# Patient Record
Sex: FEMALE | Race: WHITE | Employment: OTHER | URBAN - METROPOLITAN AREA
[De-identification: names, ages, dates, MRNs, and addresses within clinical notes are randomized per-mention and may not be internally consistent; named-entity substitution may affect disease eponyms.]

---

## 2018-12-07 ENCOUNTER — OFFICE VISIT (OUTPATIENT)
Dept: INTERNAL MEDICINE CLINIC | Age: 63
End: 2018-12-07
Payer: MEDICARE

## 2018-12-07 VITALS
SYSTOLIC BLOOD PRESSURE: 118 MMHG | BODY MASS INDEX: 36.5 KG/M2 | HEART RATE: 68 BPM | WEIGHT: 206 LBS | DIASTOLIC BLOOD PRESSURE: 66 MMHG | HEIGHT: 63 IN

## 2018-12-07 DIAGNOSIS — Z11.59 NEED FOR HEPATITIS C SCREENING TEST: ICD-10-CM

## 2018-12-07 DIAGNOSIS — Z11.4 SCREENING FOR HIV (HUMAN IMMUNODEFICIENCY VIRUS): ICD-10-CM

## 2018-12-07 DIAGNOSIS — R73.9 HYPERGLYCEMIA: ICD-10-CM

## 2018-12-07 DIAGNOSIS — Z13.820 OSTEOPOROSIS SCREENING: ICD-10-CM

## 2018-12-07 DIAGNOSIS — K50.90 CROHN'S DISEASE WITHOUT COMPLICATION, UNSPECIFIED GASTROINTESTINAL TRACT LOCATION (HCC): Chronic | ICD-10-CM

## 2018-12-07 DIAGNOSIS — E53.8 B12 DEFICIENCY: ICD-10-CM

## 2018-12-07 DIAGNOSIS — Z12.39 BREAST CANCER SCREENING: ICD-10-CM

## 2018-12-07 DIAGNOSIS — E55.9 VITAMIN D DEFICIENCY: ICD-10-CM

## 2018-12-07 DIAGNOSIS — Z78.0 POSTMENOPAUSAL: ICD-10-CM

## 2018-12-07 DIAGNOSIS — H60.501 ACUTE OTITIS EXTERNA OF RIGHT EAR, UNSPECIFIED TYPE: Primary | ICD-10-CM

## 2018-12-07 LAB
A/G RATIO: 1.5 (ref 1.1–2.2)
ALBUMIN SERPL-MCNC: 4.7 G/DL (ref 3.4–5)
ALP BLD-CCNC: 92 U/L (ref 40–129)
ALT SERPL-CCNC: 12 U/L (ref 10–40)
ANION GAP SERPL CALCULATED.3IONS-SCNC: 13 MMOL/L (ref 3–16)
AST SERPL-CCNC: 17 U/L (ref 15–37)
BASOPHILS ABSOLUTE: 0 K/UL (ref 0–0.2)
BASOPHILS RELATIVE PERCENT: 0.2 %
BILIRUB SERPL-MCNC: 0.4 MG/DL (ref 0–1)
BUN BLDV-MCNC: 12 MG/DL (ref 7–20)
CALCIUM SERPL-MCNC: 9.2 MG/DL (ref 8.3–10.6)
CHLORIDE BLD-SCNC: 103 MMOL/L (ref 99–110)
CO2: 27 MMOL/L (ref 21–32)
CREAT SERPL-MCNC: 0.8 MG/DL (ref 0.6–1.2)
EOSINOPHILS ABSOLUTE: 0 K/UL (ref 0–0.6)
EOSINOPHILS RELATIVE PERCENT: 0.1 %
FOLATE: 4.23 NG/ML (ref 4.78–24.2)
GFR AFRICAN AMERICAN: >60
GFR NON-AFRICAN AMERICAN: >60
GLOBULIN: 3.1 G/DL
GLUCOSE BLD-MCNC: 97 MG/DL (ref 70–99)
HCT VFR BLD CALC: 43.4 % (ref 36–48)
HEMOGLOBIN: 14.3 G/DL (ref 12–16)
HEPATITIS C ANTIBODY INTERPRETATION: NORMAL
LYMPHOCYTES ABSOLUTE: 2 K/UL (ref 1–5.1)
LYMPHOCYTES RELATIVE PERCENT: 37.5 %
MCH RBC QN AUTO: 30.7 PG (ref 26–34)
MCHC RBC AUTO-ENTMCNC: 33 G/DL (ref 31–36)
MCV RBC AUTO: 93.1 FL (ref 80–100)
MONOCYTES ABSOLUTE: 0.2 K/UL (ref 0–1.3)
MONOCYTES RELATIVE PERCENT: 4.2 %
NEUTROPHILS ABSOLUTE: 3 K/UL (ref 1.7–7.7)
NEUTROPHILS RELATIVE PERCENT: 58 %
PDW BLD-RTO: 16.1 % (ref 12.4–15.4)
PLATELET # BLD: 175 K/UL (ref 135–450)
PMV BLD AUTO: 8.9 FL (ref 5–10.5)
POTASSIUM SERPL-SCNC: 4.3 MMOL/L (ref 3.5–5.1)
RBC # BLD: 4.66 M/UL (ref 4–5.2)
SODIUM BLD-SCNC: 143 MMOL/L (ref 136–145)
TOTAL PROTEIN: 7.8 G/DL (ref 6.4–8.2)
VITAMIN B-12: <150 PG/ML (ref 211–911)
VITAMIN D 25-HYDROXY: 29.7 NG/ML
WBC # BLD: 5.2 K/UL (ref 4–11)

## 2018-12-07 PROCEDURE — 99214 OFFICE O/P EST MOD 30 MIN: CPT | Performed by: NURSE PRACTITIONER

## 2018-12-07 PROCEDURE — G8427 DOCREV CUR MEDS BY ELIG CLIN: HCPCS | Performed by: NURSE PRACTITIONER

## 2018-12-07 PROCEDURE — G8400 PT W/DXA NO RESULTS DOC: HCPCS | Performed by: NURSE PRACTITIONER

## 2018-12-07 PROCEDURE — 3017F COLORECTAL CA SCREEN DOC REV: CPT | Performed by: NURSE PRACTITIONER

## 2018-12-07 PROCEDURE — G8417 CALC BMI ABV UP PARAM F/U: HCPCS | Performed by: NURSE PRACTITIONER

## 2018-12-07 PROCEDURE — 1036F TOBACCO NON-USER: CPT | Performed by: NURSE PRACTITIONER

## 2018-12-07 PROCEDURE — 4130F TOPICAL PREP RX AOE: CPT | Performed by: NURSE PRACTITIONER

## 2018-12-07 PROCEDURE — 1123F ACP DISCUSS/DSCN MKR DOCD: CPT | Performed by: NURSE PRACTITIONER

## 2018-12-07 PROCEDURE — 4040F PNEUMOC VAC/ADMIN/RCVD: CPT | Performed by: NURSE PRACTITIONER

## 2018-12-07 PROCEDURE — G8482 FLU IMMUNIZE ORDER/ADMIN: HCPCS | Performed by: NURSE PRACTITIONER

## 2018-12-07 PROCEDURE — 1101F PT FALLS ASSESS-DOCD LE1/YR: CPT | Performed by: NURSE PRACTITIONER

## 2018-12-07 PROCEDURE — 1090F PRES/ABSN URINE INCON ASSESS: CPT | Performed by: NURSE PRACTITIONER

## 2018-12-07 RX ORDER — CIPROFLOXACIN 500 MG/1
500 TABLET, FILM COATED ORAL 2 TIMES DAILY
Qty: 14 TABLET | Refills: 0 | Status: SHIPPED | OUTPATIENT
Start: 2018-12-07 | End: 2018-12-14

## 2018-12-07 ASSESSMENT — PATIENT HEALTH QUESTIONNAIRE - PHQ9
2. FEELING DOWN, DEPRESSED OR HOPELESS: 0
1. LITTLE INTEREST OR PLEASURE IN DOING THINGS: 0
SUM OF ALL RESPONSES TO PHQ QUESTIONS 1-9: 0
SUM OF ALL RESPONSES TO PHQ9 QUESTIONS 1 & 2: 0
SUM OF ALL RESPONSES TO PHQ QUESTIONS 1-9: 0

## 2018-12-08 LAB
ESTIMATED AVERAGE GLUCOSE: 99.7 MG/DL
HBA1C MFR BLD: 5.1 %
HIV AG/AB: NORMAL
HIV ANTIGEN: NORMAL
HIV-1 ANTIBODY: NORMAL
HIV-2 AB: NORMAL

## 2018-12-11 LAB — METHYLMALONIC ACID: 1.19 UMOL/L (ref 0–0.4)

## 2018-12-11 ASSESSMENT — ENCOUNTER SYMPTOMS: RESPIRATORY NEGATIVE: 1

## 2018-12-13 DIAGNOSIS — E53.8 B12 DEFICIENCY: ICD-10-CM

## 2018-12-13 RX ORDER — CYANOCOBALAMIN 1000 UG/ML
1000 INJECTION INTRAMUSCULAR; SUBCUTANEOUS
Qty: 12 ML | Refills: 3 | Status: SHIPPED | OUTPATIENT
Start: 2018-12-13 | End: 2020-08-10 | Stop reason: ALTCHOICE

## 2018-12-20 ENCOUNTER — HOSPITAL ENCOUNTER (OUTPATIENT)
Dept: GENERAL RADIOLOGY | Age: 63
Discharge: HOME | End: 2018-12-20
Payer: MEDICARE

## 2018-12-20 ENCOUNTER — HOSPITAL ENCOUNTER (OUTPATIENT)
Dept: WOMENS IMAGING | Age: 63
Discharge: HOME | End: 2018-12-20
Payer: MEDICARE

## 2018-12-20 DIAGNOSIS — Z12.39 BREAST CANCER SCREENING: ICD-10-CM

## 2018-12-20 DIAGNOSIS — Z78.0 POSTMENOPAUSAL: ICD-10-CM

## 2018-12-20 DIAGNOSIS — Z13.820 OSTEOPOROSIS SCREENING: ICD-10-CM

## 2018-12-20 PROCEDURE — 77067 SCR MAMMO BI INCL CAD: CPT

## 2018-12-20 PROCEDURE — 77080 DXA BONE DENSITY AXIAL: CPT

## 2018-12-20 PROCEDURE — 77063 BREAST TOMOSYNTHESIS BI: CPT

## 2019-06-12 ENCOUNTER — OFFICE VISIT (OUTPATIENT)
Dept: INTERNAL MEDICINE CLINIC | Age: 64
End: 2019-06-12
Payer: MEDICARE

## 2019-06-12 VITALS
DIASTOLIC BLOOD PRESSURE: 72 MMHG | HEIGHT: 63 IN | HEART RATE: 76 BPM | BODY MASS INDEX: 35.79 KG/M2 | WEIGHT: 202 LBS | SYSTOLIC BLOOD PRESSURE: 114 MMHG

## 2019-06-12 DIAGNOSIS — E53.8 B12 DEFICIENCY: ICD-10-CM

## 2019-06-12 DIAGNOSIS — E55.9 VITAMIN D DEFICIENCY: ICD-10-CM

## 2019-06-12 DIAGNOSIS — G47.62 NOCTURNAL LEG CRAMPS: Primary | ICD-10-CM

## 2019-06-12 DIAGNOSIS — M25.562 CHRONIC PAIN OF LEFT KNEE: ICD-10-CM

## 2019-06-12 DIAGNOSIS — G89.29 CHRONIC PAIN OF LEFT KNEE: ICD-10-CM

## 2019-06-12 DIAGNOSIS — K50.90 CROHN'S DISEASE WITHOUT COMPLICATION, UNSPECIFIED GASTROINTESTINAL TRACT LOCATION (HCC): ICD-10-CM

## 2019-06-12 DIAGNOSIS — D61.818 PANCYTOPENIA (HCC): ICD-10-CM

## 2019-06-12 PROCEDURE — 3017F COLORECTAL CA SCREEN DOC REV: CPT | Performed by: NURSE PRACTITIONER

## 2019-06-12 PROCEDURE — 4040F PNEUMOC VAC/ADMIN/RCVD: CPT | Performed by: NURSE PRACTITIONER

## 2019-06-12 PROCEDURE — G8399 PT W/DXA RESULTS DOCUMENT: HCPCS | Performed by: NURSE PRACTITIONER

## 2019-06-12 PROCEDURE — 99214 OFFICE O/P EST MOD 30 MIN: CPT | Performed by: NURSE PRACTITIONER

## 2019-06-12 PROCEDURE — G8417 CALC BMI ABV UP PARAM F/U: HCPCS | Performed by: NURSE PRACTITIONER

## 2019-06-12 PROCEDURE — 1036F TOBACCO NON-USER: CPT | Performed by: NURSE PRACTITIONER

## 2019-06-12 PROCEDURE — G8427 DOCREV CUR MEDS BY ELIG CLIN: HCPCS | Performed by: NURSE PRACTITIONER

## 2019-06-12 PROCEDURE — 1090F PRES/ABSN URINE INCON ASSESS: CPT | Performed by: NURSE PRACTITIONER

## 2019-06-12 PROCEDURE — 1123F ACP DISCUSS/DSCN MKR DOCD: CPT | Performed by: NURSE PRACTITIONER

## 2019-06-12 ASSESSMENT — PATIENT HEALTH QUESTIONNAIRE - PHQ9
SUM OF ALL RESPONSES TO PHQ9 QUESTIONS 1 & 2: 0
1. LITTLE INTEREST OR PLEASURE IN DOING THINGS: 0
2. FEELING DOWN, DEPRESSED OR HOPELESS: 0
SUM OF ALL RESPONSES TO PHQ QUESTIONS 1-9: 0
SUM OF ALL RESPONSES TO PHQ QUESTIONS 1-9: 0

## 2019-06-12 NOTE — PROGRESS NOTES
SUBJECTIVE:    Patient ID: Sweta Gilbert is a 70 y.o. female. CC: Pain and stiffness, cramping of her legs at night, hand arthritis, left knee pain,    HPI: The patient presents to the office today for six-month follow-up of chronic medical conditions. She also has a number of acute concerns to discuss today. Pain, stiffness, swelling in hands. Cramping in legs at night. 3-4 months. Left knee pain intermittent and seems to be giving out. She has a history of chronic Crohn's disease and sees a gastroenterologist.      Past Medical History:   Diagnosis Date    Crohn's disease ([facility] [address]     Palpable purpura (HCC)     Pernicious anemia         Current Outpatient Medications   Medication Sig Dispense Refill    cyanocobalamin 1000 MCG/ML injection Inject 1 mL into the skin every 7 days 12 mL 3     No current facility-administered medications for this visit. Review of Systems   Constitutional: Negative. Respiratory: Negative. Cardiovascular: Negative. Gastrointestinal: Negative. Genitourinary: Negative. Musculoskeletal: Positive for arthralgias, gait problem, joint swelling and myalgias. All other systems reviewed and are negative. OBJECTIVE:  Physical Exam   Constitutional: She is oriented to person, place, and time. She appears well-developed and well-nourished. No distress. HENT:   Head: Normocephalic and atraumatic. Eyes: Conjunctivae are normal. No scleral icterus. Neck: Neck supple. No JVD present. Cardiovascular: Normal rate and regular rhythm. Pulmonary/Chest: Effort normal and breath sounds normal. No respiratory distress. She has no wheezes. Abdominal: Soft. She exhibits no distension. There is no tenderness. There is no rebound and no guarding. Musculoskeletal: Normal range of motion. She exhibits no edema. Neurological: She is alert and oriented to person, place, and time. Skin: Skin is warm and dry. She is not diaphoretic.    Psychiatric: She has a normal mood and affect. Her behavior is normal. Thought content normal.      /72   Pulse 76   Ht 5' 3\" (1.6 m)   Wt 202 lb (91.6 kg)   BMI 35.78 kg/m²      PHQ Scores 6/12/2019 12/7/2018   PHQ2 Score 0 0   PHQ9 Score 0 0     Interpretation of Total Score Depression Severity: 1-4 = Minimal depression, 5-9 = Mild depression, 10-14 = Moderate depression, 15-19 = Moderately severe depression, 20-27 =Severe depression        ASSESSMENT/PLAN:  Chantell was seen today for 6 month follow-up.   Diagnoses and all orders for this visit:    Nocturnal leg cramps  -Long discussion  -Recommended light activity before bed such as stationary bike and stretching  -Okay to try B complex    Chronic pain of left knee  -     XR KNEE LEFT (3 VIEWS)    Crohn's disease without complication, unspecified gastrointestinal tract location (HCC)  -Chronic, stable  -Continue current regimen    B12 deficiency  -Methylmalonic acid 1.2  -Monitor    Vitamin D deficiency  -Vitamin D improved to 30  -Monitor    Pancytopenia ([facility] [address]  -Resolved      JAILENE Isaac - CNP

## 2019-06-13 ASSESSMENT — ENCOUNTER SYMPTOMS
RESPIRATORY NEGATIVE: 1
GASTROINTESTINAL NEGATIVE: 1

## 2019-06-17 ENCOUNTER — HOSPITAL ENCOUNTER (OUTPATIENT)
Age: 64
Discharge: HOME | End: 2019-06-17
Payer: MEDICARE

## 2019-06-17 ENCOUNTER — HOSPITAL ENCOUNTER (OUTPATIENT)
Dept: GENERAL RADIOLOGY | Age: 64
Discharge: HOME | End: 2019-06-17
Payer: MEDICARE

## 2019-06-17 DIAGNOSIS — M25.562 CHRONIC PAIN OF LEFT KNEE: ICD-10-CM

## 2019-06-17 DIAGNOSIS — G89.29 CHRONIC PAIN OF LEFT KNEE: ICD-10-CM

## 2019-06-17 PROCEDURE — 73562 X-RAY EXAM OF KNEE 3: CPT

## 2019-09-19 ENCOUNTER — TELEPHONE (OUTPATIENT)
Dept: INTERNAL MEDICINE CLINIC | Age: 64
End: 2019-09-19

## 2019-09-24 ENCOUNTER — OFFICE VISIT (OUTPATIENT)
Dept: INTERNAL MEDICINE CLINIC | Age: 64
End: 2019-09-24
Payer: MEDICARE

## 2019-09-24 VITALS
HEART RATE: 64 BPM | HEIGHT: 63 IN | BODY MASS INDEX: 32.96 KG/M2 | WEIGHT: 186 LBS | DIASTOLIC BLOOD PRESSURE: 72 MMHG | SYSTOLIC BLOOD PRESSURE: 120 MMHG

## 2019-09-24 DIAGNOSIS — R42 DIZZINESS: Primary | ICD-10-CM

## 2019-09-24 DIAGNOSIS — H92.01 RIGHT EAR PAIN: ICD-10-CM

## 2019-09-24 DIAGNOSIS — M54.2 NECK PAIN ON RIGHT SIDE: ICD-10-CM

## 2019-09-24 DIAGNOSIS — R68.2 ORAL DRYNESS: ICD-10-CM

## 2019-09-24 PROCEDURE — 1123F ACP DISCUSS/DSCN MKR DOCD: CPT | Performed by: NURSE PRACTITIONER

## 2019-09-24 PROCEDURE — 1036F TOBACCO NON-USER: CPT | Performed by: NURSE PRACTITIONER

## 2019-09-24 PROCEDURE — 4040F PNEUMOC VAC/ADMIN/RCVD: CPT | Performed by: NURSE PRACTITIONER

## 2019-09-24 PROCEDURE — G8417 CALC BMI ABV UP PARAM F/U: HCPCS | Performed by: NURSE PRACTITIONER

## 2019-09-24 PROCEDURE — 3017F COLORECTAL CA SCREEN DOC REV: CPT | Performed by: NURSE PRACTITIONER

## 2019-09-24 PROCEDURE — 99213 OFFICE O/P EST LOW 20 MIN: CPT | Performed by: NURSE PRACTITIONER

## 2019-09-24 PROCEDURE — G8427 DOCREV CUR MEDS BY ELIG CLIN: HCPCS | Performed by: NURSE PRACTITIONER

## 2019-09-24 PROCEDURE — 1090F PRES/ABSN URINE INCON ASSESS: CPT | Performed by: NURSE PRACTITIONER

## 2019-09-24 PROCEDURE — 90653 IIV ADJUVANT VACCINE IM: CPT | Performed by: NURSE PRACTITIONER

## 2019-09-24 PROCEDURE — G0008 ADMIN INFLUENZA VIRUS VAC: HCPCS | Performed by: NURSE PRACTITIONER

## 2019-09-24 PROCEDURE — G8399 PT W/DXA RESULTS DOCUMENT: HCPCS | Performed by: NURSE PRACTITIONER

## 2019-09-24 ASSESSMENT — ENCOUNTER SYMPTOMS
TROUBLE SWALLOWING: 1
VOICE CHANGE: 0
SINUS PAIN: 0
FACIAL SWELLING: 0
RHINORRHEA: 0
SINUS PRESSURE: 0

## 2019-09-24 NOTE — PROGRESS NOTES
SUBJECTIVE:    Patient ID: Ulysses Epps is a 75 y.o. female. CC: Dizziness, pain about the right ear and neck    HPI: The patient presents to the office for an acute visit. Pain around the posterior right ear. This radiates up her head. She has pressure behind right eye. No vision changes. Right ear fullness, no pain, no drainage. Not normal headaches. No rashes. No fever. She has night sweats. Symptoms worse in the morning. Eye pressure more during the day. Symptoms for about 1 month and seems to stagnant. She has dizziness and seems to be stumbling more. She has dry throat and sensation of throat closing. No concerning family history. Current Outpatient Medications   Medication Sig Dispense Refill    vitamin D (CHOLECALCIFEROL) 1000 UNIT TABS tablet Take 1,000 Units by mouth daily      cyanocobalamin 1000 MCG/ML injection Inject 1 mL into the skin every 7 days 12 mL 3     No current facility-administered medications for this visit. Review of Systems   Constitutional: Positive for chills. Negative for fever. HENT: Positive for ear pain and trouble swallowing. Negative for ear discharge, facial swelling, hearing loss, rhinorrhea, sinus pressure, sinus pain and voice change. Dry mouth and throat   Eyes: Negative. Negative for pain, discharge, redness and visual disturbance. Respiratory: Negative. Negative for shortness of breath. Cardiovascular: Negative. Negative for chest pain. Gastrointestinal: Negative. Genitourinary: Negative. Musculoskeletal: Positive for neck pain. Neurological: Positive for dizziness and headaches. Negative for tremors, speech difficulty, weakness, light-headedness and numbness. OBJECTIVE:  Physical Exam   Constitutional: She is oriented to person, place, and time. She appears well-developed and well-nourished. No distress. HENT:   Head: Normocephalic and atraumatic.    Mouth/Throat: Oropharynx is clear and moist.

## 2019-09-25 ASSESSMENT — ENCOUNTER SYMPTOMS
EYE PAIN: 0
EYES NEGATIVE: 1
SHORTNESS OF BREATH: 0
EYE REDNESS: 0
RESPIRATORY NEGATIVE: 1
GASTROINTESTINAL NEGATIVE: 1
EYE DISCHARGE: 0

## 2019-10-08 ENCOUNTER — OFFICE VISIT (OUTPATIENT)
Dept: ENT CLINIC | Age: 64
End: 2019-10-08
Payer: MEDICARE

## 2019-10-08 ENCOUNTER — PROCEDURE VISIT (OUTPATIENT)
Dept: AUDIOLOGY | Age: 64
End: 2019-10-08
Payer: MEDICARE

## 2019-10-08 VITALS — OXYGEN SATURATION: 96 % | HEART RATE: 51 BPM | SYSTOLIC BLOOD PRESSURE: 142 MMHG | DIASTOLIC BLOOD PRESSURE: 86 MMHG

## 2019-10-08 DIAGNOSIS — R51.9 PRESSURE AND PAIN OF RIGHT SIDE OF FACE: Primary | ICD-10-CM

## 2019-10-08 DIAGNOSIS — H90.3 SENSORINEURAL HEARING LOSS (SNHL) OF BOTH EARS: Primary | ICD-10-CM

## 2019-10-08 DIAGNOSIS — H90.41 SENSORINEURAL HEARING LOSS (SNHL) OF RIGHT EAR WITH UNRESTRICTED HEARING OF LEFT EAR: ICD-10-CM

## 2019-10-08 PROCEDURE — G8482 FLU IMMUNIZE ORDER/ADMIN: HCPCS | Performed by: OTOLARYNGOLOGY

## 2019-10-08 PROCEDURE — G8427 DOCREV CUR MEDS BY ELIG CLIN: HCPCS | Performed by: OTOLARYNGOLOGY

## 2019-10-08 PROCEDURE — 4040F PNEUMOC VAC/ADMIN/RCVD: CPT | Performed by: OTOLARYNGOLOGY

## 2019-10-08 PROCEDURE — 1090F PRES/ABSN URINE INCON ASSESS: CPT | Performed by: OTOLARYNGOLOGY

## 2019-10-08 PROCEDURE — 92553 AUDIOMETRY AIR & BONE: CPT | Performed by: AUDIOLOGIST

## 2019-10-08 PROCEDURE — 3017F COLORECTAL CA SCREEN DOC REV: CPT | Performed by: OTOLARYNGOLOGY

## 2019-10-08 PROCEDURE — 1123F ACP DISCUSS/DSCN MKR DOCD: CPT | Performed by: OTOLARYNGOLOGY

## 2019-10-08 PROCEDURE — 1036F TOBACCO NON-USER: CPT | Performed by: OTOLARYNGOLOGY

## 2019-10-08 PROCEDURE — G8399 PT W/DXA RESULTS DOCUMENT: HCPCS | Performed by: OTOLARYNGOLOGY

## 2019-10-08 PROCEDURE — 99203 OFFICE O/P NEW LOW 30 MIN: CPT | Performed by: OTOLARYNGOLOGY

## 2019-10-08 PROCEDURE — G8417 CALC BMI ABV UP PARAM F/U: HCPCS | Performed by: OTOLARYNGOLOGY

## 2019-10-10 ENCOUNTER — HOSPITAL ENCOUNTER (OUTPATIENT)
Dept: CT IMAGING | Age: 64
Discharge: HOME | End: 2019-10-10
Payer: MEDICARE

## 2019-10-10 DIAGNOSIS — R51.9 PRESSURE AND PAIN OF RIGHT SIDE OF FACE: ICD-10-CM

## 2019-10-10 PROCEDURE — 70486 CT MAXILLOFACIAL W/O DYE: CPT

## 2019-10-11 RX ORDER — TRIAMCINOLONE ACETONIDE 55 UG/1
2 SPRAY, METERED NASAL DAILY
Qty: 1 INHALER | Refills: 1 | Status: SHIPPED | OUTPATIENT
Start: 2019-10-11 | End: 2022-04-04

## 2019-10-29 ENCOUNTER — OFFICE VISIT (OUTPATIENT)
Dept: ENT CLINIC | Age: 64
End: 2019-10-29
Payer: MEDICARE

## 2019-10-29 VITALS — HEART RATE: 64 BPM | DIASTOLIC BLOOD PRESSURE: 82 MMHG | SYSTOLIC BLOOD PRESSURE: 152 MMHG | OXYGEN SATURATION: 95 %

## 2019-10-29 DIAGNOSIS — M79.18 MYOFASCIAL PAIN ON RIGHT SIDE: Primary | ICD-10-CM

## 2019-10-29 DIAGNOSIS — J34.89 CONCHA BULLOSA: ICD-10-CM

## 2019-10-29 PROCEDURE — 31231 NASAL ENDOSCOPY DX: CPT | Performed by: OTOLARYNGOLOGY

## 2019-12-18 ENCOUNTER — OFFICE VISIT (OUTPATIENT)
Dept: INTERNAL MEDICINE CLINIC | Age: 64
End: 2019-12-18
Payer: MEDICARE

## 2019-12-18 VITALS
DIASTOLIC BLOOD PRESSURE: 72 MMHG | HEART RATE: 56 BPM | SYSTOLIC BLOOD PRESSURE: 122 MMHG | WEIGHT: 197 LBS | HEIGHT: 63 IN | BODY MASS INDEX: 34.91 KG/M2

## 2019-12-18 DIAGNOSIS — Z00.00 ROUTINE GENERAL MEDICAL EXAMINATION AT A HEALTH CARE FACILITY: Primary | ICD-10-CM

## 2019-12-18 DIAGNOSIS — Z23 NEED FOR 23-POLYVALENT PNEUMOCOCCAL POLYSACCHARIDE VACCINE: ICD-10-CM

## 2019-12-18 PROCEDURE — G0438 PPPS, INITIAL VISIT: HCPCS | Performed by: NURSE PRACTITIONER

## 2019-12-18 PROCEDURE — 3017F COLORECTAL CA SCREEN DOC REV: CPT | Performed by: NURSE PRACTITIONER

## 2019-12-18 PROCEDURE — 4040F PNEUMOC VAC/ADMIN/RCVD: CPT | Performed by: NURSE PRACTITIONER

## 2019-12-18 PROCEDURE — 90732 PPSV23 VACC 2 YRS+ SUBQ/IM: CPT | Performed by: NURSE PRACTITIONER

## 2019-12-18 PROCEDURE — 1123F ACP DISCUSS/DSCN MKR DOCD: CPT | Performed by: NURSE PRACTITIONER

## 2019-12-18 PROCEDURE — G8482 FLU IMMUNIZE ORDER/ADMIN: HCPCS | Performed by: NURSE PRACTITIONER

## 2019-12-18 PROCEDURE — G0009 ADMIN PNEUMOCOCCAL VACCINE: HCPCS | Performed by: NURSE PRACTITIONER

## 2019-12-18 SDOH — ECONOMIC STABILITY: FOOD INSECURITY: WITHIN THE PAST 12 MONTHS, YOU WORRIED THAT YOUR FOOD WOULD RUN OUT BEFORE YOU GOT MONEY TO BUY MORE.: NEVER TRUE

## 2019-12-18 SDOH — ECONOMIC STABILITY: INCOME INSECURITY: HOW HARD IS IT FOR YOU TO PAY FOR THE VERY BASICS LIKE FOOD, HOUSING, MEDICAL CARE, AND HEATING?: NOT VERY HARD

## 2019-12-18 SDOH — ECONOMIC STABILITY: TRANSPORTATION INSECURITY
IN THE PAST 12 MONTHS, HAS THE LACK OF TRANSPORTATION KEPT YOU FROM MEDICAL APPOINTMENTS OR FROM GETTING MEDICATIONS?: NO

## 2019-12-18 SDOH — ECONOMIC STABILITY: TRANSPORTATION INSECURITY
IN THE PAST 12 MONTHS, HAS LACK OF TRANSPORTATION KEPT YOU FROM MEETINGS, WORK, OR FROM GETTING THINGS NEEDED FOR DAILY LIVING?: NO

## 2019-12-18 SDOH — ECONOMIC STABILITY: FOOD INSECURITY: WITHIN THE PAST 12 MONTHS, THE FOOD YOU BOUGHT JUST DIDN'T LAST AND YOU DIDN'T HAVE MONEY TO GET MORE.: NEVER TRUE

## 2019-12-18 ASSESSMENT — LIFESTYLE VARIABLES: HOW OFTEN DO YOU HAVE A DRINK CONTAINING ALCOHOL: 0

## 2019-12-18 ASSESSMENT — PATIENT HEALTH QUESTIONNAIRE - PHQ9: SUM OF ALL RESPONSES TO PHQ QUESTIONS 1-9: 6

## 2020-07-27 ENCOUNTER — OFFICE VISIT (OUTPATIENT)
Dept: INTERNAL MEDICINE CLINIC | Age: 65
End: 2020-07-27
Payer: MEDICARE

## 2020-07-27 VITALS — DIASTOLIC BLOOD PRESSURE: 78 MMHG | TEMPERATURE: 98.1 F | SYSTOLIC BLOOD PRESSURE: 136 MMHG | HEART RATE: 70 BPM

## 2020-07-27 PROCEDURE — 1090F PRES/ABSN URINE INCON ASSESS: CPT | Performed by: NURSE PRACTITIONER

## 2020-07-27 PROCEDURE — 4040F PNEUMOC VAC/ADMIN/RCVD: CPT | Performed by: NURSE PRACTITIONER

## 2020-07-27 PROCEDURE — G8427 DOCREV CUR MEDS BY ELIG CLIN: HCPCS | Performed by: NURSE PRACTITIONER

## 2020-07-27 PROCEDURE — G8510 SCR DEP NEG, NO PLAN REQD: HCPCS | Performed by: NURSE PRACTITIONER

## 2020-07-27 PROCEDURE — 3017F COLORECTAL CA SCREEN DOC REV: CPT | Performed by: NURSE PRACTITIONER

## 2020-07-27 PROCEDURE — G8399 PT W/DXA RESULTS DOCUMENT: HCPCS | Performed by: NURSE PRACTITIONER

## 2020-07-27 PROCEDURE — 99214 OFFICE O/P EST MOD 30 MIN: CPT | Performed by: NURSE PRACTITIONER

## 2020-07-27 PROCEDURE — 1123F ACP DISCUSS/DSCN MKR DOCD: CPT | Performed by: NURSE PRACTITIONER

## 2020-07-27 PROCEDURE — 1036F TOBACCO NON-USER: CPT | Performed by: NURSE PRACTITIONER

## 2020-07-27 PROCEDURE — G8417 CALC BMI ABV UP PARAM F/U: HCPCS | Performed by: NURSE PRACTITIONER

## 2020-07-27 ASSESSMENT — PATIENT HEALTH QUESTIONNAIRE - PHQ9
SUM OF ALL RESPONSES TO PHQ QUESTIONS 1-9: 0
SUM OF ALL RESPONSES TO PHQ QUESTIONS 1-9: 0
SUM OF ALL RESPONSES TO PHQ9 QUESTIONS 1 & 2: 0
2. FEELING DOWN, DEPRESSED OR HOPELESS: 0
1. LITTLE INTEREST OR PLEASURE IN DOING THINGS: 0

## 2020-07-27 NOTE — PROGRESS NOTES
SUBJECTIVE:    Patient ID: Karey Beyer is a 75 y.o. female. CC: Dizziness, hypertension, B12 and vitamin D deficiency    HPI: The patient presents to the office today for follow-up of chronic medical conditions. She also reports recurrent dizziness. She has a history of dizziness. She was referred to ENT and underwent different treatments and procedures which seemed to help for a time. A couple of weeks ago she had a sharp pain on right head and dizziness with movement. This lasted about 10 minutes. When she laid back down dizziness returned. This felt different than past episodes of dizziness. She had generalized weakness but no specific residual weakness, speech issues, etc.    She has a history of hypertension. She denies any chest pain or shortness of breath. She is compliant with her medication regimen. She has a history of Crohn's disease. She reports this is fairly well controlled. She has a history of B12, folate, and vitamin D deficiency. She is on repletion. Past Medical History:   Diagnosis Date    Crohn's disease ([facility] [address]     Palpable purpura (HCC)     Pernicious anemia         Current Outpatient Medications   Medication Sig Dispense Refill    vitamin D (CHOLECALCIFEROL) 1000 UNIT TABS tablet Take 1,000 Units by mouth daily      cyanocobalamin 1000 MCG/ML injection Inject 1 mL into the skin every 7 days 12 mL 3    diclofenac (VOLTAREN) 50 MG EC tablet Take 1 tablet by mouth 2 times daily (Patient not taking: Reported on 7/27/2020) 30 tablet 0    triamcinolone (NASACORT ALLERGY 24HR) 55 MCG/ACT nasal inhaler 2 sprays by Each Nostril route daily (Patient not taking: Reported on 7/27/2020) 1 Inhaler 1     No current facility-administered medications for this visit. Review of Systems   Constitutional: Negative. Negative for chills and fever. Respiratory: Negative. Cardiovascular: Negative. Gastrointestinal: Negative. Genitourinary: Negative. Musculoskeletal: Positive for arthralgias. Neurological: Positive for dizziness. Psychiatric/Behavioral: Negative. OBJECTIVE:  Physical Exam  Vitals signs reviewed. Constitutional:       General: She is not in acute distress. Appearance: She is well-developed. She is not diaphoretic. HENT:      Head: Normocephalic and atraumatic. Right Ear: A middle ear effusion is present. Left Ear: Tympanic membrane normal.   Eyes:      General: No scleral icterus. Conjunctiva/sclera: Conjunctivae normal.   Neck:      Musculoskeletal: Neck supple. Vascular: No JVD. Cardiovascular:      Rate and Rhythm: Normal rate and regular rhythm. Pulmonary:      Effort: Pulmonary effort is normal. No respiratory distress. Breath sounds: Normal breath sounds. No wheezing. Abdominal:      General: There is no distension. Palpations: Abdomen is soft. Tenderness: There is no abdominal tenderness. There is no guarding or rebound. Musculoskeletal: Normal range of motion. Skin:     General: Skin is warm and dry. Neurological:      Mental Status: She is alert and oriented to person, place, and time. Psychiatric:         Behavior: Behavior normal.         Thought Content: Thought content normal.        /78   Pulse 70   Temp 98.1 °F (36.7 °C) (Oral)      PHQ Scores 7/27/2020 12/18/2019 6/12/2019 12/7/2018   PHQ2 Score 0 3 0 0   PHQ9 Score 0 6 0 0     Interpretation of Total Score Depression Severity: 1-4 = Minimal depression, 5-9 = Mild depression, 10-14 = Moderate depression, 15-19 = Moderately severe depression, 20-27 =Severe depression        ASSESSMENT/PLAN:  Chantell was seen today for 6 month follow-up. Diagnoses and all orders for this visit:    Dizziness  - She plans to see ENT again  -     COMPREHENSIVE METABOLIC PANEL; Future  -     CBC WITH AUTO DIFFERENTIAL; Future    Essential hypertension  -     COMPREHENSIVE METABOLIC PANEL;  Future  -     Vitamin D 25 Hydroxy; Future  -     METHYLMALONIC ACID, SERUM; Future  -     VITAMIN B12 & FOLATE; Future  -     CBC WITH AUTO DIFFERENTIAL; Future  -     TSH with Reflex; Future  -     LIPID PANEL; Future    B12 deficiency  -     METHYLMALONIC ACID, SERUM; Future  -     VITAMIN B12 & FOLATE; Future    Vitamin D deficiency  -     Vitamin D 25 Hydroxy; Future    Folate deficiency  -     METHYLMALONIC ACID, SERUM; Future  -     VITAMIN B12 & FOLATE; Future    Pancytopenia ([facility] [address]  -     CBC WITH AUTO DIFFERENTIAL; Future    Crohn's disease without complication, unspecified gastrointestinal tract location ([facility] [address]  -     COMPREHENSIVE METABOLIC PANEL; Future  -     CBC WITH AUTO DIFFERENTIAL;  Future        JAILENE Barger - CNP

## 2020-07-28 DIAGNOSIS — K50.90 CROHN'S DISEASE WITHOUT COMPLICATION, UNSPECIFIED GASTROINTESTINAL TRACT LOCATION (HCC): ICD-10-CM

## 2020-07-28 DIAGNOSIS — I10 ESSENTIAL HYPERTENSION: ICD-10-CM

## 2020-07-28 DIAGNOSIS — R42 DIZZINESS: ICD-10-CM

## 2020-07-28 DIAGNOSIS — E53.8 B12 DEFICIENCY: ICD-10-CM

## 2020-07-28 DIAGNOSIS — E53.8 FOLATE DEFICIENCY: ICD-10-CM

## 2020-07-28 DIAGNOSIS — E55.9 VITAMIN D DEFICIENCY: ICD-10-CM

## 2020-07-28 DIAGNOSIS — D61.818 PANCYTOPENIA (HCC): ICD-10-CM

## 2020-07-28 LAB
A/G RATIO: 1.3 (ref 1.1–2.2)
ALBUMIN SERPL-MCNC: 4.4 G/DL (ref 3.4–5)
ALP BLD-CCNC: 91 U/L (ref 40–129)
ALT SERPL-CCNC: 10 U/L (ref 10–40)
ANION GAP SERPL CALCULATED.3IONS-SCNC: 16 MMOL/L (ref 3–16)
AST SERPL-CCNC: 17 U/L (ref 15–37)
BASOPHILS ABSOLUTE: 0 K/UL (ref 0–0.2)
BASOPHILS RELATIVE PERCENT: 0.1 %
BILIRUB SERPL-MCNC: 0.5 MG/DL (ref 0–1)
BUN BLDV-MCNC: 10 MG/DL (ref 7–20)
CALCIUM SERPL-MCNC: 9.5 MG/DL (ref 8.3–10.6)
CHLORIDE BLD-SCNC: 101 MMOL/L (ref 99–110)
CHOLESTEROL, TOTAL: 164 MG/DL (ref 0–199)
CO2: 25 MMOL/L (ref 21–32)
CREAT SERPL-MCNC: 0.7 MG/DL (ref 0.6–1.2)
EOSINOPHILS ABSOLUTE: 0 K/UL (ref 0–0.6)
EOSINOPHILS RELATIVE PERCENT: 0 %
FOLATE: 4.42 NG/ML (ref 4.78–24.2)
GFR AFRICAN AMERICAN: >60
GFR NON-AFRICAN AMERICAN: >60
GLOBULIN: 3.4 G/DL
GLUCOSE BLD-MCNC: 85 MG/DL (ref 70–99)
HCT VFR BLD CALC: 41.3 % (ref 36–48)
HDLC SERPL-MCNC: 44 MG/DL (ref 40–60)
HEMOGLOBIN: 13.6 G/DL (ref 12–16)
LDL CHOLESTEROL CALCULATED: 99 MG/DL
LYMPHOCYTES ABSOLUTE: 1.6 K/UL (ref 1–5.1)
LYMPHOCYTES RELATIVE PERCENT: 30.1 %
MCH RBC QN AUTO: 30.1 PG (ref 26–34)
MCHC RBC AUTO-ENTMCNC: 33 G/DL (ref 31–36)
MCV RBC AUTO: 91 FL (ref 80–100)
MONOCYTES ABSOLUTE: 0.2 K/UL (ref 0–1.3)
MONOCYTES RELATIVE PERCENT: 3 %
NEUTROPHILS ABSOLUTE: 3.5 K/UL (ref 1.7–7.7)
NEUTROPHILS RELATIVE PERCENT: 66.8 %
PDW BLD-RTO: 20.3 % (ref 12.4–15.4)
PLATELET # BLD: 138 K/UL (ref 135–450)
PMV BLD AUTO: 9.1 FL (ref 5–10.5)
POTASSIUM SERPL-SCNC: 4 MMOL/L (ref 3.5–5.1)
RBC # BLD: 4.54 M/UL (ref 4–5.2)
SODIUM BLD-SCNC: 142 MMOL/L (ref 136–145)
TOTAL PROTEIN: 7.8 G/DL (ref 6.4–8.2)
TRIGL SERPL-MCNC: 104 MG/DL (ref 0–150)
TSH REFLEX: 3.19 UIU/ML (ref 0.27–4.2)
VITAMIN B-12: <150 PG/ML (ref 211–911)
VITAMIN D 25-HYDROXY: 29.9 NG/ML
VLDLC SERPL CALC-MCNC: 21 MG/DL
WBC # BLD: 5.3 K/UL (ref 4–11)

## 2020-07-28 ASSESSMENT — ENCOUNTER SYMPTOMS
RESPIRATORY NEGATIVE: 1
GASTROINTESTINAL NEGATIVE: 1

## 2020-07-30 RX ORDER — CHOLECALCIFEROL (VITAMIN D3) 125 MCG
500 CAPSULE ORAL DAILY
Qty: 30 TABLET | Refills: 11 | Status: SHIPPED | OUTPATIENT
Start: 2020-07-30 | End: 2022-04-04

## 2020-07-30 RX ORDER — LANOLIN ALCOHOL/MO/W.PET/CERES
400 CREAM (GRAM) TOPICAL DAILY
Qty: 30 TABLET | Refills: 11 | Status: SHIPPED | OUTPATIENT
Start: 2020-07-30 | End: 2022-04-04

## 2020-07-31 LAB — METHYLMALONIC ACID: 1.03 UMOL/L (ref 0–0.4)

## 2020-08-10 ENCOUNTER — OFFICE VISIT (OUTPATIENT)
Dept: ENT CLINIC | Age: 65
End: 2020-08-10
Payer: MEDICARE

## 2020-08-10 VITALS — DIASTOLIC BLOOD PRESSURE: 77 MMHG | SYSTOLIC BLOOD PRESSURE: 158 MMHG | HEART RATE: 63 BPM | TEMPERATURE: 98.4 F

## 2020-08-10 PROCEDURE — 1123F ACP DISCUSS/DSCN MKR DOCD: CPT | Performed by: OTOLARYNGOLOGY

## 2020-08-10 PROCEDURE — G8399 PT W/DXA RESULTS DOCUMENT: HCPCS | Performed by: OTOLARYNGOLOGY

## 2020-08-10 PROCEDURE — G8427 DOCREV CUR MEDS BY ELIG CLIN: HCPCS | Performed by: OTOLARYNGOLOGY

## 2020-08-10 PROCEDURE — 1036F TOBACCO NON-USER: CPT | Performed by: OTOLARYNGOLOGY

## 2020-08-10 PROCEDURE — 99214 OFFICE O/P EST MOD 30 MIN: CPT | Performed by: OTOLARYNGOLOGY

## 2020-08-10 PROCEDURE — 3017F COLORECTAL CA SCREEN DOC REV: CPT | Performed by: OTOLARYNGOLOGY

## 2020-08-10 PROCEDURE — G8417 CALC BMI ABV UP PARAM F/U: HCPCS | Performed by: OTOLARYNGOLOGY

## 2020-08-10 PROCEDURE — 1090F PRES/ABSN URINE INCON ASSESS: CPT | Performed by: OTOLARYNGOLOGY

## 2020-08-10 PROCEDURE — 4040F PNEUMOC VAC/ADMIN/RCVD: CPT | Performed by: OTOLARYNGOLOGY

## 2020-08-10 NOTE — PROGRESS NOTES
unremarkable. . The palatoglossal and palatopharyngeal folds, uvula, and soft palate do not obstruct the oropharynx. NECK:  The neck is supple with full range of motion. The bony and cartilaginous landmarks are normal to palpation. There is no suspicious mass or adenopathy. The thyroid gland is normal to palpation, and the trachea is midline. CHEST/PULMONARY: Effort normal, no stridor. Not tachypneic. No respiratory distress    NEURO: The patient is alert and oriented. The cranial nerves are intact. The patient did poorly on heel-to-toe, past-pointing, and Romberg. She did satisfactorily on rapid alternating movements. SKIN: Warm and dry; no pallor    PSYCHIATRIC: Psychiatric: There is a normal mood and affect.  Behavior is normal. Judgment and thought content normal.     Impression:  Recent onset subjective vertigo unrelated to position change  BPPV therefore less likely  Absence of hearing loss and tinnitus would tend to mitigate against hydrops  Rule out vestibular versus cerebellar    Plan:  Proceed with audio and video nystagmogram  Balance precautions discussed  Patient advised not to drive

## 2020-09-18 ENCOUNTER — TELEPHONE (OUTPATIENT)
Dept: ENT CLINIC | Age: 65
End: 2020-09-18

## 2020-09-18 NOTE — TELEPHONE ENCOUNTER
Hearing test results came in from Dr. Scott Cueto. Scanned into Media. Patient would like to know her next steps. Please respond to the clinical pool.

## 2020-09-30 ENCOUNTER — HOSPITAL ENCOUNTER (OUTPATIENT)
Dept: PHYSICAL THERAPY | Age: 65
Setting detail: THERAPIES SERIES
Discharge: HOME | End: 2020-09-30
Payer: MEDICARE

## 2020-09-30 PROCEDURE — 97161 PT EVAL LOW COMPLEX 20 MIN: CPT

## 2020-09-30 PROCEDURE — 97112 NEUROMUSCULAR REEDUCATION: CPT

## 2020-09-30 NOTE — FLOWSHEET NOTE
[facility] - Outpatient Physical Therapy    Physical Therapy Daily Treatment Note  Date:  2020    Patient Name:  Denis Valladares    :  1955  MRN: [id number]  Medical/Treatment Diagnosis Information:  · Diagnosis: H81.4 (ICD-10-CM) - Vertigo of central origin  · Treatment Diagnosis: imbalance, dizziness, central vertigo  Insurance/Certification information:  PT Insurance Information: MEDICARE  Physician Information:  Referring Practitioner: Sb Castaneda MD  Plan of care signed (Y/N): []  Yes [x]  No     Progress Report: [x]  Yes  []  No     Date Range for reporting period:  Beginning 20  Ending TBD    Progress report due (10 Rx/or 30 days whichever is less): 10     Recertification due (POC duration/ or 90 days whichever is less): POC     Visit # Insurance Allowable Date Range (if applicable)    PMN 1237     Latex Allergy:  [x]NO      []YES  Preferred Language for Healthcare:   [x]English       []other:    Functional Scale: DHI: 52  Date assessed: 20    Pain level:  4-5/10     SUBJECTIVE:  See eval    OBJECTIVE: See eval    RESTRICTIONS/PRECAUTIONS: N/A    Exercises/Interventions:     Therapeutic Exercises (85688)  Resistance Sets / Reps Notes   UT stretch  1 x 10'' R/L  - added   Cervical retraction  5 x 5''  - added                                                         Neuromuscular Re-ed (60935) Therapeutic Activities (60375)      VOR - seated - 2ft / 6ft  1 min ea, horizontal/vertical  - added; HEP   HABITUATION      BALANCE                        Manual Intervention ([id number])      Consider cervical PROM, cervical STM                                      Patient Education & HEP:  - Patient educated on the focus of skilled physical therapy services and plan of care, including: diagnosis, prognosis, treatment goals & options.   - The following exercises were added to the patient's home exercise program. (VOR, UT stretch, cervical retraction) Additionally, the patient was educated on appropriate frequency, intensity and duration. A handout was provided  - All patient questions were answered    Therapeutic Exercise and NMR EXR  [x] (79251) Provided verbal/tactile cueing for activities related to strengthening, flexibility, endurance, ROM for improvements in  [] LE / Lumbar: LE, proximal hip, and core control with self care, mobility, lifting, ambulation. [x] UE / Cervical: cervical, postural, scapular, scapulothoracic and UE control with self care, reaching, carrying, lifting, house/yardwork, driving, computer work. [x] (28769) Provided verbal/tactile cueing for activities related to improving balance, coordination, kinesthetic sense, posture, motor skill, proprioception to assist with   [] LE / lumbar: LE, proximal hip, and core control in self care, mobility, lifting, ambulation and eccentric single leg control. [x] UE / cervical: cervical, scapular, scapulothoracic and UE control with self care, reaching, carrying, lifting, house/yardwork, driving, computer work.      NMR and Therapeutic Activities:    [x] (90876 or 55490) Provided verbal/tactile cueing for activities related to improving balance, coordination, kinesthetic sense, posture, motor skill, proprioception and motor activation to allow for proper function of   [] LE: / Lumbar core, proximal hip and LE with self care and ADLs  [x] UE / Cervical: cervical, postural, scapular, scapulothoracic and UE control with self care, carrying, lifting, driving, computer work.   [] (42947) Gait Re-education- Provided training and instruction to the patient for proper LE, core and proximal hip recruitment and positioning and eccentric body weight control with ambulation re-education including up and down stairs     Home Exercise Program:    [x] (12895) Reviewed/Progressed HEP activities related to strengthening, flexibility, endurance, ROM of   [] LE / Lumbar: core, proximal hip and LE for functional self-care, mobility, lifting and ambulation/stair navigation   [x] UE / Cervical: cervical, postural, scapular, scapulothoracic and UE control with self care, reaching, carrying, lifting, house/yardwork, driving, computer work  [x] (91544)Reviewed/Progressed HEP activities related to improving balance, coordination, kinesthetic sense, posture, motor skill, proprioception of   [] LE: core, proximal hip and LE for self care, mobility, lifting, and ambulation/stair navigation    [x] UE / Cervical: cervical, postural,  scapular, scapulothoracic and UE control with self care, reaching, carrying, lifting, house/yardwork, driving, computer work    Manual Treatments:  PROM / STM / Oscillations-Mobs:  G-I, II, III, IV (PA's, Inf., Post.)  [] ([id number]) Provided manual therapy to mobilize LE, proximal hip and/or LS spine soft tissue/joints for the purpose of modulating pain, promoting relaxation,  increasing ROM, reducing/eliminating soft tissue swelling/inflammation/restriction, improving soft tissue extensibility and allowing for proper ROM for normal function with   [] LE / lumbar: self care, mobility, lifting and ambulation. [] UE / Cervical: self care, reaching, carrying, lifting, house/yardwork, driving, computer work. Modalities:  [] (17575) Vasopneumatic compression: Utilized vasopneumatic compression to decrease edema / swelling for the purpose of improving mobility and quad tone / recruitment which will allow for increased overall function including but not limited to self-care, transfers, ambulation, and ascending / descending stairs.      Modalities: Consider MOC    Charges:  Timed Code Treatment Minutes: 10   Total Treatment Minutes: 40     [x] EVAL - LOW (92122)   [] EVAL - MOD (82230)  [] EVAL - HIGH (16469)  [] RE-EVAL (16988)  [] TE (85366) x      [] Ionto  [x] NMR (94739) x 1      [] Vaso  [] Manual (24847) x      [] Ultrasound  [] TA x       [] Mech Traction (44963)  [] Gait Training x     [] ES (un)

## 2020-09-30 NOTE — PLAN OF CARE
[place], [facility], [address]  Phone: [phone]   Fax: [phone]     Physical Therapy Certification    Dear Referring Practitioner: Charbel Landrum MD,    We had the pleasure of evaluating the following patient for physical therapy services at [address] [place] [address]. A summary of our findings can be found in the initial assessment below. This includes our plan of care. If you have any questions or concerns regarding these findings, please do not hesitate to contact me at the office phone number checked above. Thank you for the referral.       Physician Signature:_______________________________Date:__________________  By signing above (or electronic signature), therapist's plan is approved by physician      Patient: Angy Matthews   : 1955   MRN: [id number]  Referring Physician: Referring Practitioner: Charbel Landrum MD      Evaluation Date: 2020      Medical Diagnosis Information:  Diagnosis: H81.4 (ICD-10-CM) - Vertigo of central origin   Treatment Diagnosis: imbalance, dizziness, central vertigo                                         Insurance information: PT Insurance Information: MEDICARE     Precautions/ Contra-indications: N/A  Latex Allergy:  [x]NO      []YES  Preferred Language for Healthcare:   [x]English       []other:    SUBJECTIVE: Patient stated chief complaint: -2020 she started to notice dizziness, stumbling (which is unusual for her). The patient reports she was woken at night and she felt like she was spinning. She experienced this three times, and now - she experiences dizziness when she wakes in the morning (getting out of bed). If she looks at something too long, she feels like it starts to spin. She reports she is healthy otherwise. She takes B12 and crohn's disease.  The patient reports she has fallen to the ground once, and states she was dizzy and she simply fell.  She comments stumbling is very sporadic. She comments she has had problems hearing on her right side, practically all of her life. She reports she would get ear infections nearly every winter as a child. She comments she feels fullness/pressure in the right ear; nothing ever comes out. She has vomited due to dizziness, but the last time was one month ago. She feels pain in her suboccipital space, which is not constant. Her goal for therapy is to abolish dizziness and to avoid falls. Relevant Medical History: Crohn's, RA    Functional Outcome Measures: (at least 1 at evaluation)  Dizziness Handicap Index (DHI) = 52        Pain Scale: 4-5/10     Type: []Constant   [x]Intermittent  []Radiating []Localized [x]other: sporadic     Dizziness Scale: 5-6/10    Description of symptoms: [x] Vertigo [x]  Off-balance [x] Lightheadedness    Symptoms are getting:  [] Better [] Worse  [x] Same           Description of Spells: [] Constant [x] Spontaneous [] Motion Induced [x] Induced by position changes  [x] Episodic [] Other:    Length of time spells occur: [] Seconds [x] Minutes  [] Hours [] Days [] Other:     Date of onset: June-July 2020    Setting in which symptoms first occurred: home    What increases/provokes symptoms? Getting up from bed in a.m., turning/pitching head rapidly    What decreases/eases symptoms? Sitting still    Hearing impairments:  [x] Yes  [] No  [x] Other: pressure/fullness in ear    Hearing changes since onset:  [x] Yes  [] No  [x] Other: when ear is full, she cannot hear anything on the R    Visual changes since onset: [x] Yes  [] No  [x] Other: blurred vision; normally she wears glasses    Recent falls:    [x] Yes  [] No     Comments: 1x      History of migraines/HA:  [x] Yes  [] No    Comments: now experiencing headaches; 2 days per week approx.     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 She avoid high areas.     OBJECTIVE:      Palpation: +1 TTP suboccipital musculature    Functional Mobility/Transfers: slow, but functionally independent    Posture: poor; forward head, rounded shoulders; protected posturing of cervical spine    Inspection: the patient will have spasms of cervical musculature when attempting to turn head    Numbness/Tingling: N/A    Gait: (include devices/WB status): normalized gait; she does not appear unsteady with ambulation - but avoids turning her head when making turns    Cervical AROM    Cervical Flexion 42    Cervical Extension 60    Cervical SB 20 L 20 R    Cervical rotation Mod reduced L mod reduced R      Myotomes Normal Abnormal Comments   Neck flexion (C1-C2) x     Neck side-bending (C3) x     Shoulder elevation (C4) x     Shoulder abduction (C5) x     Elbow flexion/wrist extension (C6) x     Elbow extension/wrist flexion (C7) x     Thumb abduction (C8) x     Finger abduction (T1) x       Oculomotor/Vestibular Examination & Special Tests:     Coordination:  Rapid alternating movements: [x] Normal [] Dysdiadochokinesia   Finger to Nose:   [x] Normal (very slow)  [] Dysmetric  Heel to Shin:    [x] Normal [] Ataxic    Balance & Postural Control Tests:  Clinical Test of Sensory Interaction for Balance (CTSIB) performed in Romberg stance  CONDITION TIME STRATEGY SWAY    Eyes open, firm surface 10 ankle min    Eyes closed, firm surface 10 ankle min    Eyes open, foam surface 10 hip mod    Eyes closed, foam surface 10 Hip/hands mod     Balance:  CONDITION TIME STRATEGY SWAY   Narrowed JOSE ANGEL 10 ankle N/A   Tandem R 10 ankle min   Tandem L 10 ankle min   SLS L 5 ankle min   SLS R 5 ankle min     Oculomotor/Vestibular Examination     Spontaneous nystagmus:  [] Left  [] Right [x] Absent    Gaze-Evoked nystagmus with fixation conditions   [x]Rheumatoid arthritis (M05.9)  []Osteoarthritis (M19.91)   Cardiovascular conditions   []Hypertension (I10)  []Hyperlipidemia (E78.5)  []Angina pectoris (I20)  []Atherosclerosis (I70)   Musculoskeletal conditions   []Disc pathology   []Congenital spine pathologies   []Prior surgical intervention  []Osteoporosis (M81.8)  []Osteopenia (M85.8)   Endocrine conditions   []Hypothyroid (E03.9)  []Hyperthyroid Gastrointestinal conditions   []Constipation (W69.50)   Metabolic conditions   []Morbid obesity (E66.01)  []Diabetes type 1(E10.65) or 2 (E11.65)   []Neuropathy (G60.9)     Pulmonary conditions   []Asthma (J45)  []Coughing   []COPD (J44.9)   Psychological Disorders  []Anxiety (F41.9)  []Depression (F32.9)   []Other:   [x]Other: Crohn's disease          Barriers to/and or personal factors that will affect rehab potential:              []Age  []Sex    []Smoker              []Motivation/Lack of Motivation                        [x]Co-Morbidities              []Cognitive Function, education/learning barriers              []Environmental, home barriers              []profession/work barriers  []past PT/medical experience  []other:   Justification: co-morbidities and central causation may negatively impact potential/prognosis    Falls Risk Assessment (30 days): balance assessed  [x] Falls Risk assessed; no intervention required. [] Falls Risk assessed; Patient requires intervention due to being higher risk   TUG score (>12s at risk):     [] Falls education provided, including       ASSESSMENT: The patient is a 77year old female who presents with signs and symptoms consistent with central vertigo. The patient will benefit from skilled PT services to address  Impairments in oculomotor and vestibular function as well as balance.     Functional Impairments:  Problem List/Functional Limitations:   [] BPPV    [] Right [] Posterior Canal [] Canalithiasis    [] Left  [] Horizontal Canal [] Cupulolithiasis   [x] Decreased Gaze Stabilization    [x] Increased Motion Sensitivity   [x] Unilateral Vestibular Hypofunction (R>L)  [] Bilateral Vestibular Hypofunction   [] Gait Instability    [] Decreased tolerance for ADLs    [] Decreased functional strength   [x] Reduced Balance/Proprioceptive control   [x] Reduced ability to hear/focus   [] Noted cervical/thoracic/GHJ joint hypomobility   [] Noted cervical/thoracic/GHJ joint hypermobility   [x] Decreased cervical/UE functional ROM   [x] Noted Headache pain aggravated by neck movements with/without dizziness   [] Abnormal reflexes/sensation/myotomal/dermatomal deficits   [] Decreased DCF control or ability to hold head up   [] Decreased RC/scapular/core strength and neuromuscular control     Functional Activity Limitations (from functional questionnaire and intake)   []Reduced ability to tolerate prolonged functional positions   [x]Reduced ability or difficulty with changes of positions or transfers between positions  [x]Reduced ability to transfer in/out of bed or rolling in bed   [x]Reduced ability or tolerance with driving, reading and/or computer work   []Reduced ability to perform lifting, reaching, carrying tasks   [x]Reduced ability to forward bend   []Reduced ability to ambulate prolonged functional periods/distances/surfaces   []Reduced ability to ascend/descend stairs  []Reduced ability to concentrate/focus  [x]Reduced ability to turn/pitch head rapidly  []Reduced ability to self-correct for losses of balance []other:       Participation Restrictions   [x]Reduced participation in self-care activities   [x]Reduced participation in home management activities   [x]Reduced participation in work activities   [x]Reduced participation in social activities   []Reduced participation in sport/recreational activities    Classification:   []Signs/symptoms consistent with BPPV (benign paroxysmal positional vertigo)      []Signs/symptoms consistent with unilateral peripheral vestibulopathy (i.e., vestibular neuritis, labyrinthitis, acoustic neuroma)   [x]Signs/symptoms consistent with central vestibulopathy  []Signs/symptoms consistent with migraine-related vestibulopathy  []Signs/symptoms consistent with Meniere's disease / post-traumatic hydrops  []Signs/symptoms consistent with perilymphatic fistula   [x]Signs/symptoms consistent with cervicogenic dizziness  []Signs/symptoms consistent with gait instability   []Signs/symptoms consistent with motion sensitivity    []signs/symptoms consistent with neck pain with mobility deficits     []signs/symptoms consistent with neck pain with movement coordinated impairments    []signs/symptoms consistent with neck pain with radiating pain    []signs/symptoms consistent with neck pain with headaches (cervicogenic)    []Signs/symptoms consistent with nerve root involvement including myotome & dermatome dysfunction   []sign/symptoms consistent with facet dysfunction of cervical and thoracic spine    []signs/symptoms consistent suggesting central cord compression/UMN syndromes   []signs/symptoms consistent with discogenic cervical pain   []signs/symptoms consistent with rib dysfunction   []signs/symptoms consistent with postural dysfunction   []signs/symptoms consistent with shoulder pathology    []signs/symptoms consistent with post-surgical status including decreased ROM, strength and function.    []signs/symptoms consistent with pathology which may benefit from Dry Needling  []signs/symptoms which may limit the use of advanced manual therapy techniques: (Elevated CV risk profile, recent trauma, intolerance to end range positions, prior TIA, visual issues, UE neurological compromise)   []other:      Prognosis/Rehab Potential:      []Excellent   [x]Good    []Fair   []Poor    Tolerance of evaluation/treatment:    []Excellent   []Good    [x]Fair   []Poor     Physical Therapy Evaluation Complexity Justification  [x] A history of present problem with:  [] no personal factors and/or comorbidities that impact the plan of care;  [x]1-2 personal factors and/or comorbidities that impact the plan of care  []3 personal factors and/or comorbidities that impact the plan of care  [x] An examination of body systems using standardized tests and measures addressing any of the following: body structures and functions (impairments), activity limitations, and/or participation restrictions;:  [] a total of 1-2 or more elements   [] a total of 3 or more elements   [x] a total of 4 or more elements   [x] A clinical presentation with:  [x] stable and/or uncomplicated characteristics   [] evolving clinical presentation with changing characteristics  [] unstable and unpredictable characteristics;   [x] Clinical decision making of [x] low, [] moderate, [] high complexity using standardized patient assessment instrument and/or measurable assessment of functional outcome. [x] EVAL (LOW) 34274 (typically 15 minutes face-to-face)  [] EVAL (MOD) 11771 (typically 30 minutes face-to-face)  [] EVAL (HIGH) 28472 (typically 45 minutes face-to-face)  [] RE-EVAL     PLAN:   Today's Treatment:    [x] See flowsheet   [] Patient treated with canalith repositioning maneuver   [] Education materials provided on BPPV/Vestibular Dysfunction/Habituation   [] Precautions provided and patient to follow precautions for next 24 hours in regards to BPPV management   [x] Written home exercise instructions   [] Other:    Frequency/Duration:  2 days per week for 6 Weeks:  Interventions:  [x]  Therapeutic exercise including: strength training, ROM, for LEs, cervical spine & UEs  [x]  NMR activation and proprioception for BLEs, vestibular training/habituation, balance, coordination  [x]  Manual therapy as indicated via: canalith repositioning maneuvers, Dry Needling/IASTM, STM, PROM, Gr I-IV mobilizations, manipulation.    [x]  Modalities as needed that may include: thermal agents, E-stim, Biofeedback, US, iontophoresis as indicated  [x]  Gait training  [x]  Patient education on BPPV/vestibular function, balance, postural re-education, activity modification, progression of HEP. HEP instruction:   Access Code: [email or web address]   URL: [email or web address]   Date: 09/30/2020   Prepared by: Charla Nolasco     Exercises   Cervical Retraction - 3 reps - 1 sets - 10 hold - 2x daily - 7x weekly    Seated Upper Trapezius Stretch - 3 reps - 1 sets - 10 hold - 2x daily - 7x weekly     GOALS:  Patient stated goal: TO ABOLISH DIZZINESS, TO PREVENT FALLS  [] Progressing: [] Met: [] Not Met: [] Adjusted    Therapist goals for Patient:    Short Term Goals: To be achieved in: 2 weeks  1. Independent in HEP and progression per patient tolerance, in order to prevent re-injury. [] Progressing: [] Met: [] Not Met: [] Adjusted  2. Patient will have a decrease in dizziness/imbalance/symptoms to <2/10 on average to facilitate improvement in movement, function, balance and ADLs as indicated by Functional Deficits. [] Progressing: [] Met: [] Not Met: [] Adjusted    Long Term Goals: To be achieved in: 6 weeks  1. Disability index score of <35 or less for the [facility] to assist with reaching prior level of function. [] Progressing: [] Met: [] Not Met: [] Adjusted  2. Patient will demonstrate increased cervical AROM to WNL, joint mobility WNL to allow for proper joint functioning as indicated by patients Functional Deficits. [] Progressing: [] Met: [] Not Met: [] Adjusted  3. Patient will demonstrate negative oculomotor special testing/positional testing as indicated by patients Functional Deficits. [] Progressing: [] Met: [] Not Met: [] Adjusted  4. Patient will return to functional activities including transfers in/out of bed without increased symptoms or restriction. [] Progressing: [] Met: [] Not Met: [] Adjusted  5. The patient will remain fall-free from the start of PT services with ADLs and iADLs.   [] Progressing: [] Met: [] Not Met: [] Adjusted     Electronically signed by:  Shawnee Arellano, PT, DPT, OMT-C

## 2020-10-07 ENCOUNTER — HOSPITAL ENCOUNTER (OUTPATIENT)
Dept: PHYSICAL THERAPY | Age: 65
Setting detail: THERAPIES SERIES
Discharge: HOME | End: 2020-10-07
Payer: MEDICARE

## 2020-10-07 PROCEDURE — 97112 NEUROMUSCULAR REEDUCATION: CPT

## 2020-10-07 PROCEDURE — 97140 MANUAL THERAPY 1/> REGIONS: CPT

## 2020-10-07 PROCEDURE — 97530 THERAPEUTIC ACTIVITIES: CPT

## 2020-10-07 NOTE — FLOWSHEET NOTE
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.4 (ICD-10-CM) - Vertigo of central origin  · Treatment Diagnosis: imbalance, dizziness, central vertigo  Insurance/Certification information:  PT Insurance Information: MEDICARE  Physician Information:  Referring Practitioner: Chio Knox MD  Plan of care signed (Y/N): []  Yes [x]  No     Progress Report: [x]  Yes  []  No     Date Range for reporting period:  Beginning 20  Ending TBD    Progress report due (10 Rx/or 30 days whichever is less): 10     Recertification due (POC duration/ or 90 days whichever is less): POC     Visit # Insurance Allowable Date Range (if applicable)    PMN 1700     Latex Allergy:  [x]NO      []YES  Preferred Language for Healthcare:   [x]English       []other:    Functional Scale: DHI: 52  Date assessed: 20    Pain level:  4-5/10     SUBJECTIVE:  Patient reports that she has been about the same.      OBJECTIVE: See eval    RESTRICTIONS/PRECAUTIONS: N/A    Exercises/Interventions:     Therapeutic Exercises (29712)  Resistance Sets / Reps Notes   UT stretch  1 x 10'' R/L  - added   Cervical retraction  5 x 5''  - added                                                         Neuromuscular Re-ed (80129) Therapeutic Activities (60898)      VOR - seated - 2ft / 6ft  1 min ea, horizontal/vertical 10/7 dizziness > Horiz vs Vert      - added; HEP   HABITUATION  Smooth Pursuit (seated) vert & horiz      Saccades  vert & Horiz     10/6 vert pressure of right ear; horiz pressure in left ear    10/6 Dizziness & nausea    Turns Twist  10x                      Manual Intervention (92155)       cervical PROM, cervical STM  5 min                                    Patient Education & HEP:  - Patient educated on the focus of skilled physical therapy services and plan of care, including: diagnosis, prognosis, treatment goals & options. - The following exercises were added to the patient's home exercise program. (VOR, UT stretch, cervical retraction) Additionally, the patient was educated on appropriate frequency, intensity and duration. A handout was provided  - All patient questions were answered    Therapeutic Exercise and NMR EXR  [x] (78480) Provided verbal/tactile cueing for activities related to strengthening, flexibility, endurance, ROM for improvements in  [] LE / Lumbar: LE, proximal hip, and core control with self care, mobility, lifting, ambulation. [x] UE / Cervical: cervical, postural, scapular, scapulothoracic and UE control with self care, reaching, carrying, lifting, house/yardwork, driving, computer work. [x] (04823) Provided verbal/tactile cueing for activities related to improving balance, coordination, kinesthetic sense, posture, motor skill, proprioception to assist with   [] LE / lumbar: LE, proximal hip, and core control in self care, mobility, lifting, ambulation and eccentric single leg control. [x] UE / cervical: cervical, scapular, scapulothoracic and UE control with self care, reaching, carrying, lifting, house/yardwork, driving, computer work.      NMR and Therapeutic Activities:    [x] (61503 or 50364) Provided verbal/tactile cueing for activities related to improving balance, coordination, kinesthetic sense, posture, motor skill, proprioception and motor activation to allow for proper function of   [] LE: / Lumbar core, proximal hip and LE with self care and ADLs  [x] UE / Cervical: cervical, postural, scapular, scapulothoracic and UE control with self care, carrying, lifting, driving, computer work.   [] (45352) Gait Re-education- Provided training and instruction to the patient for proper LE, core and proximal hip recruitment and positioning and eccentric body weight control with ambulation re-education including up and down stairs     Home Exercise Program:    [x] (17685) Reviewed/Progressed HEP activities related to strengthening, flexibility, endurance, ROM of   [] LE / Lumbar: core, proximal hip and LE for functional self-care, mobility, lifting and ambulation/stair navigation   [x] UE / Cervical: cervical, postural, scapular, scapulothoracic and UE control with self care, reaching, carrying, lifting, house/yardwork, driving, computer work  [x] (52763)Reviewed/Progressed HEP activities related to improving balance, coordination, kinesthetic sense, posture, motor skill, proprioception of   [] LE: core, proximal hip and LE for self care, mobility, lifting, and ambulation/stair navigation    [x] UE / Cervical: cervical, postural,  scapular, scapulothoracic and UE control with self care, reaching, carrying, lifting, house/yardwork, driving, computer work    Manual Treatments:  PROM / STM / Oscillations-Mobs:  G-I, II, III, IV (PA's, Inf., Post.)  [] (88215) Provided manual therapy to mobilize LE, proximal hip and/or LS spine soft tissue/joints for the purpose of modulating pain, promoting relaxation,  increasing ROM, reducing/eliminating soft tissue swelling/inflammation/restriction, improving soft tissue extensibility and allowing for proper ROM for normal function with   [] LE / lumbar: self care, mobility, lifting and ambulation. [] UE / Cervical: self care, reaching, carrying, lifting, house/yardwork, driving, computer work. Modalities:  [] (35242) Vasopneumatic compression: Utilized vasopneumatic compression to decrease edema / swelling for the purpose of improving mobility and quad tone / recruitment which will allow for increased overall function including but not limited to self-care, transfers, ambulation, and ascending / descending stairs.      Modalities: Consider MOC    Charges:  Timed Code Treatment Minutes: 41   Total Treatment Minutes: 41     [] EVAL - LOW (63255)   [] EVAL - MOD (60694)  [] EVAL - HIGH (57978)  [] RE-EVAL (78960)  [] TE (48779) x      [] Ionto  [x] NMR (31744) x 2      [] Vaso  [] Manual (78181) x      [] Ultrasound  [x] TA x  1     [] Mech Traction (09842)  [] Gait Training x     [] ES (un) (30073):   [] Aquatic therapy x   [] Other:   [] Group:        GOALS:  Patient stated goal: TO ABOLISH DIZZINESS, TO PREVENT FALLS  []? Progressing: []? Met: []? Not Met: []? Adjusted     Therapist goals for Patient:    Short Term Goals: To be achieved in: 2 weeks  1. Independent in HEP and progression per patient tolerance, in order to prevent re-injury. []? Progressing: []? Met: []? Not Met: []? Adjusted  2. Patient will have a decrease in dizziness/imbalance/symptoms to <2/10 on average to facilitate improvement in movement, function, balance and ADLs as indicated by Functional Deficits. []? Progressing: []? Met: []? Not Met: []? Adjusted     Long Term Goals: To be achieved in: 6 weeks  1. Disability index score of <35 or less for the [address] [place] [address] to assist with reaching prior level of function. []? Progressing: []? Met: []? Not Met: []? Adjusted  2. Patient will demonstrate increased cervical AROM to WNL, joint mobility WNL to allow for proper joint functioning as indicated by patients Functional Deficits. []? Progressing: []? Met: []? Not Met: []? Adjusted  3. Patient will demonstrate negative oculomotor special testing/positional testing as indicated by patients Functional Deficits. []? Progressing: []? Met: []? Not Met: []? Adjusted  4. Patient will return to functional activities including transfers in/out of bed without increased symptoms or restriction. []? Progressing: []? Met: []? Not Met: []? Adjusted  5. The patient will remain fall-free from the start of PT services with ADLs and iADLs.  []? Progressing: []? Met: []? Not Met: []?  Adjusted         Overall Progression Towards Functional goals/ Treatment Progress Update:  [] Patient is progressing as expected towards functional goals listed. [] Progression is slowed due to complexities/Impairments listed. [] Progression has been slowed due to co-morbidities. [x] Plan just implemented, too soon to assess goals progression <30days   [] Goals require adjustment due to lack of progress  [] Patient is not progressing as expected and requires additional follow up with physician  [] Other:     Persisting Functional Limitations/Impairments:  []Sleeping []Sitting               []Standing [x]Transfers        []Walking []Kneeling               [x]Stairs [x]Squatting / bending   [x]ADLs []Reaching  []Lifting  [x]Housework  [x]Driving [x]Job related tasks  []Sports/Recreation []Other:      ASSESSMENT:  Patient had dizziness with head turns and oculomotor activities this date. Had mild nausea with Saccades exercises in both directions. Most of the habituation produced pressure in the ears left and right at times. Patient will continue to benefit from vestibular rehab to improve balance and oculomotor function. Treatment/Activity Tolerance:  [x] Patient tolerated treatment well [] Patient limited by fatigue  [] Patient limited by pain  [] Patient limited by other medical complications  [] Other:     Prognosis: [] Good [x] Fair  [] Poor    Patient Requires Follow-up: [x] Yes  [] No    PLAN: POC: PT 2x / week for 6 weeks. Assess response to HEP. Begin habituation (patient very sensitive), simple balance; consider TUG    [] Continue per plan of care [] Alter current plan (see comments)  [x] Plan of care initiated [] Hold pending MD visit [] Discharge    Electronically signed by: Zabrina Malone PT, DPT,     Note: If patient does not return for scheduled/ recommended follow up visits, this note will serve as a discharge from care along with most recent update on progress.

## 2020-10-09 ENCOUNTER — HOSPITAL ENCOUNTER (OUTPATIENT)
Dept: PHYSICAL THERAPY | Age: 65
Setting detail: THERAPIES SERIES
Discharge: HOME | End: 2020-10-09
Payer: MEDICARE

## 2020-10-09 PROCEDURE — 97112 NEUROMUSCULAR REEDUCATION: CPT

## 2020-10-09 PROCEDURE — 97140 MANUAL THERAPY 1/> REGIONS: CPT

## 2020-10-09 NOTE — FLOWSHEET NOTE
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.4 (ICD-10-CM) - Vertigo of central origin  · Treatment Diagnosis: imbalance, dizziness, central vertigo  Insurance/Certification information:  PT Insurance Information: MEDICARE  Physician Information:  Referring Practitioner: Afia Epstein MD  Plan of care signed (Y/N): []  Yes [x]  No     Progress Report: [x]  Yes  []  No     Date Range for reporting period:  Beginning 20  Ending TBD    Progress report due (10 Rx/or 30 days whichever is less): 10     Recertification due (POC duration/ or 90 days whichever is less): POC     Visit # Insurance Allowable Date Range (if applicable)   0 PMN 8169     Latex Allergy:  [x]NO      []YES  Preferred Language for Healthcare:   [x]English       []other:    Functional Scale: DHI: 52  Date assessed: 20    Pain level:  4-5/10     SUBJECTIVE:  Patient reports that she has been about the same.      OBJECTIVE: See eval    RESTRICTIONS/PRECAUTIONS: N/A    Exercises/Interventions:     Therapeutic Exercises (51915)  Resistance Sets / Reps Notes   UT stretch  2 x 10'' R/L  - added   Cervical retraction  5 x 10''  - added                                                         Neuromuscular Re-ed (47770) Therapeutic Activities (81363)      VOR - seated - 2ft / 6ft  1 min ea, horizontal/vertical 10/9, 10/7 dizziness > Horiz vs Vert      - added; HEP   HABITUATION  Smooth Pursuit (seated) vert & horiz      Saccades  vert & Horiz     10/9 pressure in back head 10/6 vert pressure of right ear; horiz pressure in left ear    10/9 Pressure in back head  10/7 Dizziness & nausea    Turns Twist  10x    Quarter turns   2x ea  10/9 off balance to left   Sways A/P, M/L EO   10x 10/9 off balance    EC NBOS   3/10\" Added 10/9 Manual Intervention ([id number])       cervical PROM, cervical STM, SOR  9 min                                    Patient Education & HEP:  - Patient educated on the focus of skilled physical therapy services and plan of care, including: diagnosis, prognosis, treatment goals & options. - The following exercises were added to the patient's home exercise program. (VOR, UT stretch, cervical retraction) Additionally, the patient was educated on appropriate frequency, intensity and duration. A handout was provided  - All patient questions were answered    Therapeutic Exercise and NMR EXR  [x] (97312) Provided verbal/tactile cueing for activities related to strengthening, flexibility, endurance, ROM for improvements in  [] LE / Lumbar: LE, proximal hip, and core control with self care, mobility, lifting, ambulation. [x] UE / Cervical: cervical, postural, scapular, scapulothoracic and UE control with self care, reaching, carrying, lifting, house/yardwork, driving, computer work. [x] (41573) Provided verbal/tactile cueing for activities related to improving balance, coordination, kinesthetic sense, posture, motor skill, proprioception to assist with   [] LE / lumbar: LE, proximal hip, and core control in self care, mobility, lifting, ambulation and eccentric single leg control. [x] UE / cervical: cervical, scapular, scapulothoracic and UE control with self care, reaching, carrying, lifting, house/yardwork, driving, computer work.      NMR and Therapeutic Activities:    [x] (85060 or 56591) Provided verbal/tactile cueing for activities related to improving balance, coordination, kinesthetic sense, posture, motor skill, proprioception and motor activation to allow for proper function of   [] LE: / Lumbar core, proximal hip and LE with self care and ADLs  [x] UE / Cervical: cervical, postural, scapular, scapulothoracic and UE control with self care, carrying, lifting, driving, computer work.   [] (38903) Gait Re-education- Provided training and instruction to the patient for proper LE, core and proximal hip recruitment and positioning and eccentric body weight control with ambulation re-education including up and down stairs     Home Exercise Program:    [x] (46781) Reviewed/Progressed HEP activities related to strengthening, flexibility, endurance, ROM of   [] LE / Lumbar: core, proximal hip and LE for functional self-care, mobility, lifting and ambulation/stair navigation   [x] UE / Cervical: cervical, postural, scapular, scapulothoracic and UE control with self care, reaching, carrying, lifting, house/yardwork, driving, computer work  [x] (89600)Reviewed/Progressed HEP activities related to improving balance, coordination, kinesthetic sense, posture, motor skill, proprioception of   [] LE: core, proximal hip and LE for self care, mobility, lifting, and ambulation/stair navigation    [x] UE / Cervical: cervical, postural,  scapular, scapulothoracic and UE control with self care, reaching, carrying, lifting, house/yardwork, driving, computer work    Manual Treatments:  PROM / STM / Oscillations-Mobs:  G-I, II, III, IV (PA's, Inf., Post.)  [] (94285) Provided manual therapy to mobilize LE, proximal hip and/or LS spine soft tissue/joints for the purpose of modulating pain, promoting relaxation,  increasing ROM, reducing/eliminating soft tissue swelling/inflammation/restriction, improving soft tissue extensibility and allowing for proper ROM for normal function with   [] LE / lumbar: self care, mobility, lifting and ambulation. [] UE / Cervical: self care, reaching, carrying, lifting, house/yardwork, driving, computer work.      Modalities:  [] (05908) Vasopneumatic compression: Utilized vasopneumatic compression to decrease edema / swelling for the purpose of improving mobility and quad tone / recruitment which will allow for increased overall function including but not limited to self-care, transfers, ambulation, and ascending / descending stairs. Modalities: Consider MOC    Charges:  Timed Code Treatment Minutes: 41   Total Treatment Minutes: 41     [] EVAL - LOW (51159)   [] EVAL - MOD (73259)  [] EVAL - HIGH (12955)  [] RE-EVAL (08118)  [] TE (00661) x      [] Ionto  [x] NMR (98722) x 2      [] Vaso  [x] Manual (52741) x  1    [] Ultrasound  [] TA x       [] Mech Traction (81316)  [] Gait Training x     [] ES (un) (10519):   [] Aquatic therapy x   [] Other:   [] Group:        GOALS:  Patient stated goal: TO ABOLISH DIZZINESS, TO PREVENT FALLS  []? Progressing: []? Met: []? Not Met: []? Adjusted     Therapist goals for Patient:    Short Term Goals: To be achieved in: 2 weeks  1. Independent in HEP and progression per patient tolerance, in order to prevent re-injury. []? Progressing: []? Met: []? Not Met: []? Adjusted  2. Patient will have a decrease in dizziness/imbalance/symptoms to <2/10 on average to facilitate improvement in movement, function, balance and ADLs as indicated by Functional Deficits. []? Progressing: []? Met: []? Not Met: []? Adjusted     Long Term Goals: To be achieved in: 6 weeks  1. Disability index score of <35 or less for the [facility] to assist with reaching prior level of function. []? Progressing: []? Met: []? Not Met: []? Adjusted  2. Patient will demonstrate increased cervical AROM to WNL, joint mobility WNL to allow for proper joint functioning as indicated by patients Functional Deficits. []? Progressing: []? Met: []? Not Met: []? Adjusted  3. Patient will demonstrate negative oculomotor special testing/positional testing as indicated by patients Functional Deficits. []? Progressing: []? Met: []? Not Met: []? Adjusted  4. Patient will return to functional activities including transfers in/out of bed without increased symptoms or restriction. []? Progressing: []? Met: []? Not Met: []? Adjusted  5. The patient will remain fall-free from the start of PT services with ADLs and iADLs.  []?  Progressing: []? Met: []? Not Met: []? Adjusted         Overall Progression Towards Functional goals/ Treatment Progress Update:  [] Patient is progressing as expected towards functional goals listed. [] Progression is slowed due to complexities/Impairments listed. [] Progression has been slowed due to co-morbidities. [x] Plan just implemented, too soon to assess goals progression <30days   [] Goals require adjustment due to lack of progress  [] Patient is not progressing as expected and requires additional follow up with physician  [] Other:     Persisting Functional Limitations/Impairments:  []Sleeping []Sitting               []Standing [x]Transfers        []Walking []Kneeling               [x]Stairs [x]Squatting / bending   [x]ADLs []Reaching  []Lifting  [x]Housework  [x]Driving [x]Job related tasks  []Sports/Recreation []Other:      ASSESSMENT:  The Patient able to tolerate increase habituation and standing balance activities with minimal off balance and dizziness. The patient was challenged with eyes closed having the sensation of falling forward/minimal body sway. She demonstrates delayed saccadic eye movement with minimal dizziness. The patient presents with cervical joint stiffness and upper trap tightness. Patient will continue to benefit from gradual progression of habituation and balance activities for vestibular stimulation and increase functional mobility. Treatment/Activity Tolerance:  [x] Patient tolerated treatment well [] Patient limited by fatigue  [] Patient limited by pain  [] Patient limited by other medical complications  [] Other:     Prognosis: [] Good [x] Fair  [] Poor    Patient Requires Follow-up: [x] Yes  [] No    PLAN: POC: PT 2x / week for 6 weeks. Assess response to HEP.  Begin habituation (patient very sensitive), simple balance; consider TUG    [x] Continue per plan of care [] Alter current plan (see comments)  [x] Plan of care initiated [] Hold pending MD visit [] Discharge    Electronically signed by: Casper Harry PT, DPT,     Note: If patient does not return for scheduled/ recommended follow up visits, this note will serve as a discharge from care along with most recent update on progress.

## 2020-10-14 ENCOUNTER — HOSPITAL ENCOUNTER (OUTPATIENT)
Dept: PHYSICAL THERAPY | Age: 65
Setting detail: THERAPIES SERIES
Discharge: HOME | End: 2020-10-14
Payer: MEDICARE

## 2020-10-14 PROCEDURE — 97140 MANUAL THERAPY 1/> REGIONS: CPT

## 2020-10-14 PROCEDURE — 97110 THERAPEUTIC EXERCISES: CPT

## 2020-10-14 PROCEDURE — 97112 NEUROMUSCULAR REEDUCATION: CPT

## 2020-10-14 NOTE — FLOWSHEET NOTE
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.4 (ICD-10-CM) - Vertigo of central origin  · Treatment Diagnosis: imbalance, dizziness, central vertigo  Insurance/Certification information:  PT Insurance Information: MEDICARE  Physician Information:  Referring Practitioner: Chio Knox MD  Plan of care signed (Y/N): []  Yes [x]  No     Progress Report: [x]  Yes  []  No     Date Range for reporting period:  Beginning 20   Ending TBD    Progress report due (10 Rx/or 30 days whichever is less): 10     Recertification due (POC duration/ or 90 days whichever is less): POC     Visit # Insurance Allowable Date Range (if applicable)   3/07 PMN 8770     Latex Allergy:  [x]NO      []YES  Preferred Language for Healthcare:   [x]English       []other:    Functional Scale: DHI: 52  Date assessed: 20    Pain level:  4-5/10     SUBJECTIVE:  The patient reports her #1 strategy at quieting dizziness/nausea is to stop movement and to close her eyes. She finds if she continues to target something or move her head, that symptoms increase considerably. She states that rapid movement of her head or eyes increases symptoms considerably.     OBJECTIVE: 10/14 - TUG = 10 sec; cervical flexion = 60deg (post manual)    RESTRICTIONS/PRECAUTIONS: N/A    Exercises/Interventions:     Therapeutic Exercises (27318)  Resistance Sets / Reps Notes   UT stretch  Cervical retraction  5 x 5''Supine Chin Tucks + Edu  8 x 5'' 10/14 - added                                                   Neuromuscular Re-ed (14092) Therapeutic Activities (09208)      VOR - seated - 2ft / 6ft  HABITUATION  Smooth Pursuit (seated) vert & horiz      Saccades  vert & Horiz    60'' trials, ea   10/ pressure in back head 10/6 vert pressure of right ear; horiz pressure in left ear    10/9 Pressure in back head  10/7 Dizziness & nausea     10/14 - patient instructed that she can close her eyes to indicate overstimulation and PT will allow her brief rest at that time; slight building of pressure in eye muscles    Seated - VOR Cancellation - vert/horizontal  10x 10/14 - added   Turns Twist  Quarter turns   Sways A/P, M/L EO   EC NBOS   Tandem R/L w/ head turns/pitches  10 ea, R/L 10/14 - added         Manual Intervention (46352)       cervical PROM (SB, flex, rot), cervical STM (UT/LS, scalenes), SOR, UT/LS stretches  15 min 10/14 - performed                                   Patient Education & HEP:  10/14 - added cervical tucks    - Patient educated on the focus of skilled physical therapy services and plan of care, including: diagnosis, prognosis, treatment goals & options. - The following exercises were added to the patient's home exercise program. (VOR, UT stretch, cervical retraction) Additionally, the patient was educated on appropriate frequency, intensity and duration. A handout was provided  - All patient questions were answered    Therapeutic Exercise and NMR EXR  [x] (92469) Provided verbal/tactile cueing for activities related to strengthening, flexibility, endurance, ROM for improvements in  [] LE / Lumbar: LE, proximal hip, and core control with self care, mobility, lifting, ambulation. [x] UE / Cervical: cervical, postural, scapular, scapulothoracic and UE control with self care, reaching, carrying, lifting, house/yardwork, driving, computer work. [x] (14631) Provided verbal/tactile cueing for activities related to improving balance, coordination, kinesthetic sense, posture, motor skill, proprioception to assist with   [] LE / lumbar: LE, proximal hip, and core control in self care, mobility, lifting, ambulation and eccentric single leg control.    [x] UE / cervical: cervical, scapular, scapulothoracic and UE control with self care, reaching, carrying, lifting, house/yardwork, driving, computer work.      NMR and Therapeutic Activities:    [x] (89513 or 48056) Provided verbal/tactile cueing for activities related to improving balance, coordination, kinesthetic sense, posture, motor skill, proprioception and motor activation to allow for proper function of   [] LE: / Lumbar core, proximal hip and LE with self care and ADLs  [x] UE / Cervical: cervical, postural, scapular, scapulothoracic and UE control with self care, carrying, lifting, driving, computer work.   [] (34513) Gait Re-education- Provided training and instruction to the patient for proper LE, core and proximal hip recruitment and positioning and eccentric body weight control with ambulation re-education including up and down stairs     Home Exercise Program:    [x] (47405) Reviewed/Progressed HEP activities related to strengthening, flexibility, endurance, ROM of   [] LE / Lumbar: core, proximal hip and LE for functional self-care, mobility, lifting and ambulation/stair navigation   [x] UE / Cervical: cervical, postural, scapular, scapulothoracic and UE control with self care, reaching, carrying, lifting, house/yardwork, driving, computer work  [x] (69970)Reviewed/Progressed HEP activities related to improving balance, coordination, kinesthetic sense, posture, motor skill, proprioception of   [] LE: core, proximal hip and LE for self care, mobility, lifting, and ambulation/stair navigation    [x] UE / Cervical: cervical, postural,  scapular, scapulothoracic and UE control with self care, reaching, carrying, lifting, house/yardwork, driving, computer work    Manual Treatments:  PROM / STM / Oscillations-Mobs:  G-I, II, III, IV (PA's, Inf., Post.)  [x] (33428) Provided manual therapy to mobilize LE, proximal hip and/or LS spine soft tissue/joints for the purpose of modulating pain, promoting relaxation,  increasing ROM, reducing/eliminating soft tissue swelling/inflammation/restriction, improving soft tissue extensibility and allowing for proper ROM for normal function with   [] LE / lumbar: self care, mobility, lifting and ambulation. [x] UE / Cervical: self care, reaching, carrying, lifting, house/yardwork, driving, computer work. Modalities:  [] (51544) Vasopneumatic compression: Utilized vasopneumatic compression to decrease edema / swelling for the purpose of improving mobility and quad tone / recruitment which will allow for increased overall function including but not limited to self-care, transfers, ambulation, and ascending / descending stairs. Modalities: Consider MOC    Charges:  Timed Code Treatment Minutes: 53   Total Treatment Minutes: 53     [] EVAL - LOW (08756)   [] EVAL - MOD (92883)  [] EVAL - HIGH (06509)  [] RE-EVAL (60874)  [x] TE (08118) x 1     [] Ionto  [x] NMR (74612) x 2      [] Vaso  [x] Manual (07094) x  1    [] Ultrasound  [] TA x       [] Mech Traction (58139)  [] Gait Training x     [] ES (un) (93795):   [] Aquatic therapy x   [] Other:   [] Group:        GOALS:  Patient stated goal: TO ABOLISH DIZZINESS, TO PREVENT FALLS  []? Progressing: []? Met: []? Not Met: []? Adjusted     Therapist goals for Patient:    Short Term Goals: To be achieved in: 2 weeks  1. Independent in HEP and progression per patient tolerance, in order to prevent re-injury. []? Progressing: []? Met: []? Not Met: []? Adjusted  2. Patient will have a decrease in dizziness/imbalance/symptoms to <2/10 on average to facilitate improvement in movement, function, balance and ADLs as indicated by Functional Deficits. []? Progressing: []? Met: []? Not Met: []? Adjusted     Long Term Goals: To be achieved in: 6 weeks  1. Disability index score of <35 or less for the [facility] to assist with reaching prior level of function. []? Progressing: []? Met: []? Not Met: []? Adjusted  2.  Patient will demonstrate increased cervical AROM to WNL, joint mobility WNL to allow for proper joint functioning as indicated by patients Functional Deficits. []? Progressing: []? Met: []? Not Met: []? Adjusted  3. Patient will demonstrate negative oculomotor special testing/positional testing as indicated by patients Functional Deficits. []? Progressing: []? Met: []? Not Met: []? Adjusted  4. Patient will return to functional activities including transfers in/out of bed without increased symptoms or restriction. []? Progressing: []? Met: []? Not Met: []? Adjusted  5. The patient will remain fall-free from the start of PT services with ADLs and iADLs.  []? Progressing: []? Met: []? Not Met: []? Adjusted         Overall Progression Towards Functional goals/ Treatment Progress Update:  [] Patient is progressing as expected towards functional goals listed. [] Progression is slowed due to complexities/Impairments listed. [] Progression has been slowed due to co-morbidities. [x] Plan just implemented, too soon to assess goals progression <30days   [] Goals require adjustment due to lack of progress  [] Patient is not progressing as expected and requires additional follow up with physician  [] Other:     Persisting Functional Limitations/Impairments:  []Sleeping []Sitting               []Standing [x]Transfers        []Walking []Kneeling               [x]Stairs [x]Squatting / bending   [x]ADLs []Reaching  []Lifting  [x]Housework  [x]Driving [x]Job related tasks  []Sports/Recreation []Other:      ASSESSMENT:  The patient has difficulty with rapid movements involving head or eyes, so exercises were performed very slowly and brief breaks were required between. The patient demonstrates improvement in cervical flexion ROM after manual and SOR, but the patient's cervical movement is very jumpy and \"cogwheel-like\" indicating poor cervical proprioception and poor cervical musculature control.         Treatment/Activity Tolerance:  [x] Patient tolerated treatment well [] Patient limited by fatigue  [] Patient limited by pain  [] Patient limited by other medical complications  [] Other:     Prognosis: [] Good [x] Fair  [] Poor    Patient Requires Follow-up: [x] Yes  [] No    PLAN: POC: PT 2x / week for 6 weeks. Assess response to HEP. habituation (patient very sensitive), balance; cervical manual, cervical AROM, eventually tuck and lifts  [x] Continue per plan of care [] Alter current plan (see comments)  [] Plan of care initiated [] Hold pending MD visit [] Discharge    Electronically signed by: Cristy Hurd PT, DPT, OMT-C    Note: If patient does not return for scheduled/ recommended follow up visits, this note will serve as a discharge from care along with most recent update on progress.

## 2020-10-16 ENCOUNTER — HOSPITAL ENCOUNTER (OUTPATIENT)
Dept: PHYSICAL THERAPY | Age: 65
Setting detail: THERAPIES SERIES
Discharge: HOME | End: 2020-10-16
Payer: MEDICARE

## 2020-10-16 PROCEDURE — 97140 MANUAL THERAPY 1/> REGIONS: CPT

## 2020-10-16 PROCEDURE — 97112 NEUROMUSCULAR REEDUCATION: CPT

## 2020-10-16 NOTE — FLOWSHEET NOTE
[facility] - Outpatient Physical Therapy    Physical Therapy Daily Treatment Note  Date:  10/16/2020    Patient Name:  Abdifatah Baker    :  1955  MRN: [id number]  Medical/Treatment Diagnosis Information:  · Diagnosis: H81.4 (ICD-10-CM) - Vertigo of central origin  · Treatment Diagnosis: imbalance, dizziness, central vertigo  Insurance/Certification information:  PT Insurance Information: MEDICARE  Physician Information:  Referring Practitioner: Radha Hernandez MD  Plan of care signed (Y/N): []  Yes [x]  No     Progress Report: [x]  Yes  []  No     Date Range for reporting period:  Beginning 20   Ending TBD    Progress report due (10 Rx/or 30 days whichever is less): 10     Recertification due (POC duration/ or 90 days whichever is less): POC     Visit # Insurance Allowable Date Range (if applicable)    PMN 2436     Latex Allergy:  [x]NO      []YES  Preferred Language for Healthcare:   [x]English       []other:    Functional Scale: DHI: 52  Date assessed: 20    Pain level:  4-5/10     SUBJECTIVE:  Patient reports that she still having some dizziness , nausea, and neck stiffness . The patient reports her #1 strategy at quieting dizziness/nausea is to stop movement and to close her eyes. She finds if she continues to target something or move her head, that symptoms increase considerably. She states that rapid movement of her head or eyes increases symptoms considerably.     OBJECTIVE: 10/14 - TUG = 10 sec; cervical flexion = 60deg (post manual)    RESTRICTIONS/PRECAUTIONS: N/A    Exercises/Interventions:     Therapeutic Exercises (86917)  Resistance Sets / Reps Notes   UT stretch  2 x 10'' R/LCervical retraction  5 x 5''Supine Chin Tucks + Edu  8 x 5'' 10/14 - added                                                   Neuromuscular Re-ed (95793) Therapeutic Activities (41947)      VOR - seated - 2ft / 6ft  1 min ea, horizontal/verticalHABITUATION  Smooth Pursuit (seated) vert & horiz      Saccades  vert & Horiz    60'' trials, ea   10/9 pressure in back head 10/6 vert pressure of right ear; horiz pressure in left ear    10/9 Pressure in back head  10/7 Dizziness & nausea     10/14 - patient instructed that she can close her eyes to indicate overstimulation and PT will allow her brief rest at that time; slight building of pressure in eye muscles    Seated - VOR Cancellation - vert/horizontal  10x 10/14 - added   Turns Twist  Quarter turns   Sways A/P, M/L EO / EC  10xEC NBOS   Tandem R/L w/ head turns/pitches  10 ea, R/L 10/14 - added         Manual Intervention (93782)       cervical PROM (SB, flex, rot), cervical STM (UT/LS, scalenes), SOR, UT/LS stretches  12 min 10/14 - performed                                   Patient Education & HEP:  10/14 - added cervical tucks    - Patient educated on the focus of skilled physical therapy services and plan of care, including: diagnosis, prognosis, treatment goals & options. - The following exercises were added to the patient's home exercise program. (VOR, UT stretch, cervical retraction) Additionally, the patient was educated on appropriate frequency, intensity and duration. A handout was provided  - All patient questions were answered    Therapeutic Exercise and NMR EXR  [x] (94968) Provided verbal/tactile cueing for activities related to strengthening, flexibility, endurance, ROM for improvements in  [] LE / Lumbar: LE, proximal hip, and core control with self care, mobility, lifting, ambulation. [x] UE / Cervical: cervical, postural, scapular, scapulothoracic and UE control with self care, reaching, carrying, lifting, house/yardwork, driving, computer work.   [x] (22235) Provided verbal/tactile cueing for activities related to improving balance, coordination, kinesthetic sense, posture, motor skill, proprioception to assist with   [] LE / lumbar: LE, proximal hip, and core control in self care, mobility, lifting, ambulation and eccentric single leg control. [x] UE / cervical: cervical, scapular, scapulothoracic and UE control with self care, reaching, carrying, lifting, house/yardwork, driving, computer work.      NMR and Therapeutic Activities:    [x] (48093 or 05297) Provided verbal/tactile cueing for activities related to improving balance, coordination, kinesthetic sense, posture, motor skill, proprioception and motor activation to allow for proper function of   [] LE: / Lumbar core, proximal hip and LE with self care and ADLs  [x] UE / Cervical: cervical, postural, scapular, scapulothoracic and UE control with self care, carrying, lifting, driving, computer work.   [] (92115) Gait Re-education- Provided training and instruction to the patient for proper LE, core and proximal hip recruitment and positioning and eccentric body weight control with ambulation re-education including up and down stairs     Home Exercise Program:    [x] (97817) Reviewed/Progressed HEP activities related to strengthening, flexibility, endurance, ROM of   [] LE / Lumbar: core, proximal hip and LE for functional self-care, mobility, lifting and ambulation/stair navigation   [x] UE / Cervical: cervical, postural, scapular, scapulothoracic and UE control with self care, reaching, carrying, lifting, house/yardwork, driving, computer work  [x] (84625)Reviewed/Progressed HEP activities related to improving balance, coordination, kinesthetic sense, posture, motor skill, proprioception of   [] LE: core, proximal hip and LE for self care, mobility, lifting, and ambulation/stair navigation    [x] UE / Cervical: cervical, postural,  scapular, scapulothoracic and UE control with self care, reaching, carrying, lifting, house/yardwork, driving, computer work    Manual Treatments:  PROM / STM / Oscillations-Mobs:  G-I, II, III, IV (PA's, Inf., Post.)  [x] (10675) Provided manual therapy to mobilize LE, proximal hip and/or LS spine soft tissue/joints for the purpose of modulating pain, promoting relaxation,  increasing ROM, reducing/eliminating soft tissue swelling/inflammation/restriction, improving soft tissue extensibility and allowing for proper ROM for normal function with   [] LE / lumbar: self care, mobility, lifting and ambulation. [x] UE / Cervical: self care, reaching, carrying, lifting, house/yardwork, driving, computer work. Modalities:  [] (91392) Vasopneumatic compression: Utilized vasopneumatic compression to decrease edema / swelling for the purpose of improving mobility and quad tone / recruitment which will allow for increased overall function including but not limited to self-care, transfers, ambulation, and ascending / descending stairs. Modalities: Consider [facility] [place]    Charges:  Timed Code Treatment Minutes: 46   Total Treatment Minutes: 46     [] EVAL - LOW (36181)   [] EVAL - MOD (93256)  [] EVAL - HIGH (99758)  [] RE-EVAL (59885)  [] TE (61318) x      [] Ionto  [x] NMR (92504) x 2      [] Vaso  [x] Manual (01201) x  1    [] Ultrasound  [] TA x       [] Mech Traction (36122)  [] Gait Training x     [] ES (un) (99801):   [] Aquatic therapy x   [] Other:   [] Group:        GOALS:  Patient stated goal: TO ABOLISH DIZZINESS, TO PREVENT FALLS  []? Progressing: []? Met: []? Not Met: []? Adjusted     Therapist goals for Patient:    Short Term Goals: To be achieved in: 2 weeks  1. Independent in HEP and progression per patient tolerance, in order to prevent re-injury. []? Progressing: []? Met: []? Not Met: []? Adjusted  2. Patient will have a decrease in dizziness/imbalance/symptoms to <2/10 on average to facilitate improvement in movement, function, balance and ADLs as indicated by Functional Deficits. []? Progressing: []? Met: []? Not Met: []? Adjusted     Long Term Goals: To be achieved in: 6 weeks  1. Disability index score of <35 or less for the [address] [place] [address] to assist with reaching prior level of function. []? Progressing: []? Met: []?  Not Met: []? Adjusted  2. Patient will demonstrate increased cervical AROM to WNL, joint mobility WNL to allow for proper joint functioning as indicated by patients Functional Deficits. []? Progressing: []? Met: []? Not Met: []? Adjusted  3. Patient will demonstrate negative oculomotor special testing/positional testing as indicated by patients Functional Deficits. []? Progressing: []? Met: []? Not Met: []? Adjusted  4. Patient will return to functional activities including transfers in/out of bed without increased symptoms or restriction. []? Progressing: []? Met: []? Not Met: []? Adjusted  5. The patient will remain fall-free from the start of PT services with ADLs and iADLs.  []? Progressing: []? Met: []? Not Met: []? Adjusted         Overall Progression Towards Functional goals/ Treatment Progress Update:  [] Patient is progressing as expected towards functional goals listed. [] Progression is slowed due to complexities/Impairments listed. [] Progression has been slowed due to co-morbidities. [x] Plan just implemented, too soon to assess goals progression <30days   [] Goals require adjustment due to lack of progress  [] Patient is not progressing as expected and requires additional follow up with physician  [] Other:     Persisting Functional Limitations/Impairments:  []Sleeping []Sitting               []Standing [x]Transfers        []Walking []Kneeling               [x]Stairs [x]Squatting / bending   [x]ADLs []Reaching  []Lifting  [x]Housework  [x]Driving [x]Job related tasks  []Sports/Recreation []Other:      ASSESSMENT:  The continues to have dizziness with rapid head movement and LOB with eyes closed (decrease proprioception). The patient tightness and tenderness at right upper trap and scalenes. Advised pt to monitor sleeping posture and continue cervical stretches. Patient will benefit from further habituation, manual therapy and balance to restore confidence and decrease fall risk.      The patient has difficulty with rapid movements involving head or eyes, so exercises were performed very slowly and brief breaks were required between. The patient demonstrates improvement in cervical flexion ROM after manual and SOR, but the patient's cervical movement is very jumpy and \"cogwheel-like\" indicating poor cervical proprioception and poor cervical musculature control. Treatment/Activity Tolerance:  [x] Patient tolerated treatment well [] Patient limited by fatigue  [] Patient limited by pain  [] Patient limited by other medical complications  [] Other:     Prognosis: [] Good [x] Fair  [] Poor    Patient Requires Follow-up: [x] Yes  [] No    PLAN: POC: PT 2x / week for 6 weeks. Assess response to HEP. habituation (patient very sensitive), balance; cervical manual, cervical AROM, eventually tuck and lifts  [x] Continue per plan of care [] Alter current plan (see comments)  [] Plan of care initiated [] Hold pending MD visit [] Discharge    Electronically signed by: Maddison Espinoza PT, DPT, OMT-C    Note: If patient does not return for scheduled/ recommended follow up visits, this note will serve as a discharge from care along with most recent update on progress.

## 2020-10-21 ENCOUNTER — HOSPITAL ENCOUNTER (OUTPATIENT)
Dept: PHYSICAL THERAPY | Age: 65
Setting detail: THERAPIES SERIES
Discharge: HOME | End: 2020-10-21
Payer: MEDICARE

## 2020-10-21 PROCEDURE — 97140 MANUAL THERAPY 1/> REGIONS: CPT

## 2020-10-21 PROCEDURE — 97112 NEUROMUSCULAR REEDUCATION: CPT

## 2020-10-21 NOTE — FLOWSHEET NOTE
[facility] - Outpatient Physical Therapy    Physical Therapy Daily Treatment Note  Date:  10/21/2020    Patient Name:  Anny Perez    :  1955  MRN: [id number]  Medical/Treatment Diagnosis Information:  · Diagnosis: H81.4 (ICD-10-CM) - Vertigo of central origin  · Treatment Diagnosis: imbalance, dizziness, central vertigo  Insurance/Certification information:  PT Insurance Information: MEDICARE  Physician Information:  Referring Practitioner: uLcy Miller MD  Plan of care signed (Y/N): []  Yes [x]  No     Progress Report: [x]  Yes  []  No     Date Range for reporting period:  Beginning 20   Ending TBD    Progress report due (10 Rx/or 30 days whichever is less): 10     Recertification due (POC duration/ or 90 days whichever is less): POC     Visit # Insurance Allowable Date Range (if applicable)         Latex Allergy:  [x]NO      []YES  Preferred Language for Healthcare:   [x]English       []other:    Functional Scale: DHI: 52  Date assessed: 20    Pain level:  4-5/10     SUBJECTIVE:  Patient reports that she still having some dizziness , nausea, and neck stiffness . The patient reports her #1 strategy at quieting dizziness/nausea is to stop movement and to close her eyes. She finds if she continues to target something or move her head, that symptoms increase considerably. She states that rapid movement of her head or eyes increases symptoms considerably.     OBJECTIVE: 10/14 - TUG = 10 sec; cervical flexion = 60deg (post manual)    RESTRICTIONS/PRECAUTIONS: N/A    Exercises/Interventions:     Therapeutic Exercises (52421)  Resistance Sets / Reps Notes   UT stretch  2 x 10'' R/LCervical retraction  5 x 5''Supine Chin Tucks + Edu  8 x 5'' 10/14 - added                                                   Neuromuscular Re-ed (17719) Therapeutic Activities (31492)      VOR -  - 2ft / 6ft  1 min ea, horizontal/verticalHABITUATION  Smooth Pursuit (seated) vert & horiz      Saccades  vert & Horiz    Folding over to knees (habituation)    60'' trials, ea   10/9 pressure in back head 10/6 vert pressure of right ear; horiz pressure in left ear  10/9 Pressure in back head  10/21     10/7 Dizziness & nausea     10/14 - patient instructed that she can close her eyes to indicate overstimulation and PT will allow her brief rest at that time; slight building of pressure in eye muscles     - VOR Cancellation - vert/horizontal  10x 10/21 -standing  10/14 - added   Turns Twist  Quarter turns   Sways A/P, M/L EO / EC  10xEC NBOS   Tandem R/L w/ head turns/pitches  10 ea, R/L 10/14 - added         Manual Intervention (46926)       cervical PROM (SB, flex, rot), cervical STM (UT/LS, scalenes), SOR, UT/LS stretches  12 min 10/14 - performed                                   Patient Education & HEP:  10/14 - added cervical tucks    - Patient educated on the focus of skilled physical therapy services and plan of care, including: diagnosis, prognosis, treatment goals & options. - The following exercises were added to the patient's home exercise program. (VOR, UT stretch, cervical retraction) Additionally, the patient was educated on appropriate frequency, intensity and duration. A handout was provided  - All patient questions were answered    Therapeutic Exercise and NMR EXR  [x] (32964) Provided verbal/tactile cueing for activities related to strengthening, flexibility, endurance, ROM for improvements in  [] LE / Lumbar: LE, proximal hip, and core control with self care, mobility, lifting, ambulation. [x] UE / Cervical: cervical, postural, scapular, scapulothoracic and UE control with self care, reaching, carrying, lifting, house/yardwork, driving, computer work.   [x] (94772) Provided verbal/tactile cueing for activities related to improving balance, coordination, kinesthetic sense, posture, motor skill, proprioception to assist with   [] LE / lumbar: LE, proximal hip, and core control in self care, mobility, lifting, ambulation and eccentric single leg control. [x] UE / cervical: cervical, scapular, scapulothoracic and UE control with self care, reaching, carrying, lifting, house/yardwork, driving, computer work.      NMR and Therapeutic Activities:    [x] (16876 or 47392) Provided verbal/tactile cueing for activities related to improving balance, coordination, kinesthetic sense, posture, motor skill, proprioception and motor activation to allow for proper function of   [] LE: / Lumbar core, proximal hip and LE with self care and ADLs  [x] UE / Cervical: cervical, postural, scapular, scapulothoracic and UE control with self care, carrying, lifting, driving, computer work.   [] (67824) Gait Re-education- Provided training and instruction to the patient for proper LE, core and proximal hip recruitment and positioning and eccentric body weight control with ambulation re-education including up and down stairs     Home Exercise Program:    [x] (58702) Reviewed/Progressed HEP activities related to strengthening, flexibility, endurance, ROM of   [] LE / Lumbar: core, proximal hip and LE for functional self-care, mobility, lifting and ambulation/stair navigation   [x] UE / Cervical: cervical, postural, scapular, scapulothoracic and UE control with self care, reaching, carrying, lifting, house/yardwork, driving, computer work  [x] (98759)Reviewed/Progressed HEP activities related to improving balance, coordination, kinesthetic sense, posture, motor skill, proprioception of   [] LE: core, proximal hip and LE for self care, mobility, lifting, and ambulation/stair navigation    [x] UE / Cervical: cervical, postural,  scapular, scapulothoracic and UE control with self care, reaching, carrying, lifting, house/yardwork, driving, computer work    Manual Treatments:  PROM / STM / Oscillations-Mobs:  G-I, II, III, IV (PA's, Inf., Post.)  [x] (82676) Provided manual therapy to mobilize LE, proximal hip and/or LS spine soft tissue/joints for the purpose of modulating pain, promoting relaxation,  increasing ROM, reducing/eliminating soft tissue swelling/inflammation/restriction, improving soft tissue extensibility and allowing for proper ROM for normal function with   [] LE / lumbar: self care, mobility, lifting and ambulation. [x] UE / Cervical: self care, reaching, carrying, lifting, house/yardwork, driving, computer work. Modalities:  [] (40865) Vasopneumatic compression: Utilized vasopneumatic compression to decrease edema / swelling for the purpose of improving mobility and quad tone / recruitment which will allow for increased overall function including but not limited to self-care, transfers, ambulation, and ascending / descending stairs. Modalities: Consider [facility]    Charges:  Timed Code Treatment Minutes: 41   Total Treatment Minutes: 41     [] EVAL - LOW (66324)   [] EVAL - MOD (94359)  [] EVAL - HIGH (49951)  [] RE-EVAL (08836)  [] TE (44970) x      [] Ionto  [x] NMR (77103) x 2      [] Vaso  [x] Manual (87452) x  1    [] Ultrasound  [] TA x       [] Mech Traction (69725)  [] Gait Training x     [] ES (un) (60069):   [] Aquatic therapy x   [] Other:   [] Group:        GOALS:  Patient stated goal: TO ABOLISH DIZZINESS, TO PREVENT FALLS  []? Progressing: []? Met: []? Not Met: []? Adjusted     Therapist goals for Patient:    Short Term Goals: To be achieved in: 2 weeks  1. Independent in HEP and progression per patient tolerance, in order to prevent re-injury. []? Progressing: []? Met: []? Not Met: []? Adjusted  2. Patient will have a decrease in dizziness/imbalance/symptoms to <2/10 on average to facilitate improvement in movement, function, balance and ADLs as indicated by Functional Deficits. []? Progressing: []? Met: []? Not Met: []? Adjusted     Long Term Goals: To be achieved in: 6 weeks  1. Disability index score of <35 or less for the [facility] to assist with reaching prior level of function.    []? Progressing: []? Met: []? Not Met: []? Adjusted  2. Patient will demonstrate increased cervical AROM to WNL, joint mobility WNL to allow for proper joint functioning as indicated by patients Functional Deficits. []? Progressing: []? Met: []? Not Met: []? Adjusted  3. Patient will demonstrate negative oculomotor special testing/positional testing as indicated by patients Functional Deficits. []? Progressing: []? Met: []? Not Met: []? Adjusted  4. Patient will return to functional activities including transfers in/out of bed without increased symptoms or restriction. []? Progressing: []? Met: []? Not Met: []? Adjusted  5. The patient will remain fall-free from the start of PT services with ADLs and iADLs.  []? Progressing: []? Met: []? Not Met: []? Adjusted         Overall Progression Towards Functional goals/ Treatment Progress Update:  [] Patient is progressing as expected towards functional goals listed. [] Progression is slowed due to complexities/Impairments listed. [] Progression has been slowed due to co-morbidities. [x] Plan just implemented, too soon to assess goals progression <30days   [] Goals require adjustment due to lack of progress  [] Patient is not progressing as expected and requires additional follow up with physician  [] Other:     Persisting Functional Limitations/Impairments:  []Sleeping []Sitting               []Standing [x]Transfers        []Walking []Kneeling               [x]Stairs [x]Squatting / bending   [x]ADLs []Reaching  []Lifting  [x]Housework  [x]Driving [x]Job related tasks  []Sports/Recreation []Other:      ASSESSMENT:   10/21: The patient was able to performing habituation exercises in standing with minimal dizziness by provocation. Patient encouraged to consider drinking water 6-8 cups vs [facility]. Patient presented with trigger points in right upper trap and decrease OA movement.  She will continue to benefit from Therapy to restore confidence with balance reducing dizziness and neck tension. T      Treatment/Activity Tolerance:  [x] Patient tolerated treatment well [] Patient limited by fatigue  [] Patient limited by pain  [] Patient limited by other medical complications  [] Other:     Prognosis: [] Good [x] Fair  [] Poor    Patient Requires Follow-up: [x] Yes  [] No    PLAN: POC: PT 2x / week for 6 weeks. Assess response to HEP. habituation (patient very sensitive), balance; cervical manual, cervical AROM, eventually tuck and lifts  [x] Continue per plan of care [] Alter current plan (see comments)  [] Plan of care initiated [] Hold pending MD visit [] Discharge    Electronically signed by: Carmen Grewal PT, DPT, OMT-C    Note: If patient does not return for scheduled/ recommended follow up visits, this note will serve as a discharge from care along with most recent update on progress.

## 2020-10-23 ENCOUNTER — HOSPITAL ENCOUNTER (OUTPATIENT)
Dept: PHYSICAL THERAPY | Age: 65
Setting detail: THERAPIES SERIES
Discharge: HOME | End: 2020-10-23
Payer: MEDICARE

## 2020-10-23 PROCEDURE — 97140 MANUAL THERAPY 1/> REGIONS: CPT

## 2020-10-23 PROCEDURE — 97112 NEUROMUSCULAR REEDUCATION: CPT

## 2020-10-23 NOTE — FLOWSHEET NOTE
[facility] - Outpatient Physical Therapy    Physical Therapy Daily Treatment Note  Date:  10/23/2020    Patient Name:  Katerina Nicole    :  1955  MRN: [id number]  Medical/Treatment Diagnosis Information:  · Diagnosis: H81.4 (ICD-10-CM) - Vertigo of central origin  · Treatment Diagnosis: imbalance, dizziness, central vertigo  Insurance/Certification information:  PT Insurance Information: MEDICARE  Physician Information:  Referring Practitioner: Chio Knox MD  Plan of care signed (Y/N): []  Yes [x]  No     Progress Report: [x]  Yes  []  No     Date Range for reporting period:  Beginning 20   Ending TBD    Progress report due (10 Rx/or 30 days whichever is less): 10     Recertification due (POC duration/ or 90 days whichever is less): POC     Visit # Insurance Allowable Date Range (if applicable)    PMN 3331     Latex Allergy:  [x]NO      []YES  Preferred Language for Healthcare:   [x]English       []other:    Functional Scale: DHI: 52  Date assessed: 20    Pain level:  4-5/10     SUBJECTIVE:  Patient reports that she has been doing pretty good. Patient reports that she still having some dizziness , nausea, and neck stiffness . The patient reports her #1 strategy at quieting dizziness/nausea is to stop movement and to close her eyes. She finds if she continues to target something or move her head, that symptoms increase considerably. She states that rapid movement of her head or eyes increases symptoms considerably.     OBJECTIVE: 10/14 - TUG = 10 sec; cervical flexion = 60deg (post manual)    RESTRICTIONS/PRECAUTIONS: N/A    Exercises/Interventions:     Therapeutic Exercises (22834)  Resistance Sets / Reps Notes   UT stretch  2 x 10'' R/LCervical retraction  5 x 5''Supine Chin Tucks + Edu   10/14 - added                                                   Neuromuscular Re-ed (68633) Therapeutic Activities (86875)      VOR -  - 2ft / 6ft  1 min ea, horizontal/vehcvgkr10/21 standingHABITUATION  Smooth Pursuit (seated) vert & horiz        Folding over to knees (habituation)    60'' trials, ea   10/9 pressure in back head 10/6 vert pressure of right ear; horiz pressure in left ear    10/23 nausea and dizziness     10/7 Dizziness & nausea     10/14 - patient instructed that she can close her eyes to indicate overstimulation and PT will allow her brief rest at that time; slight building of pressure in eye muscles     - VOR Cancellation - vert/horizontal  10x 10/21 -standing  10/14 - added   Turns Twist  Quarter turns   3x eaSways A/P, M/L EO / EC  10x 10/23 pressure in the headEC NBOS   Tandem R/L w/ head turns/pitches   10/14 - added   Gait : Head nod / Delman Ormond   25\" x 2 10/23 added-- pressure in the back of the head R/L side   Manual Intervention (47746)       cervical PROM (SB, flex, rot), cervical STM (UT/LS, scalenes), SOR, UT/LS stretches  15 min 10/14 - performed                                   Patient Education & HEP:  10/14 - added cervical tucks    - Patient educated on the focus of skilled physical therapy services and plan of care, including: diagnosis, prognosis, treatment goals & options. - The following exercises were added to the patient's home exercise program. (VOR, UT stretch, cervical retraction) Additionally, the patient was educated on appropriate frequency, intensity and duration. A handout was provided  - All patient questions were answered    Therapeutic Exercise and NMR EXR  [x] (45733) Provided verbal/tactile cueing for activities related to strengthening, flexibility, endurance, ROM for improvements in  [] LE / Lumbar: LE, proximal hip, and core control with self care, mobility, lifting, ambulation. [x] UE / Cervical: cervical, postural, scapular, scapulothoracic and UE control with self care, reaching, carrying, lifting, house/yardwork, driving, computer work.   [x] (00330) Provided verbal/tactile cueing for activities related to improving balance, coordination, kinesthetic sense, posture, motor skill, proprioception to assist with   [] LE / lumbar: LE, proximal hip, and core control in self care, mobility, lifting, ambulation and eccentric single leg control. [x] UE / cervical: cervical, scapular, scapulothoracic and UE control with self care, reaching, carrying, lifting, house/yardwork, driving, computer work.      NMR and Therapeutic Activities:    [x] (99548 or 11527) Provided verbal/tactile cueing for activities related to improving balance, coordination, kinesthetic sense, posture, motor skill, proprioception and motor activation to allow for proper function of   [] LE: / Lumbar core, proximal hip and LE with self care and ADLs  [x] UE / Cervical: cervical, postural, scapular, scapulothoracic and UE control with self care, carrying, lifting, driving, computer work.   [] (98124) Gait Re-education- Provided training and instruction to the patient for proper LE, core and proximal hip recruitment and positioning and eccentric body weight control with ambulation re-education including up and down stairs     Home Exercise Program:    [x] (52907) Reviewed/Progressed HEP activities related to strengthening, flexibility, endurance, ROM of   [] LE / Lumbar: core, proximal hip and LE for functional self-care, mobility, lifting and ambulation/stair navigation   [x] UE / Cervical: cervical, postural, scapular, scapulothoracic and UE control with self care, reaching, carrying, lifting, house/yardwork, driving, computer work  [x] (64107)Reviewed/Progressed HEP activities related to improving balance, coordination, kinesthetic sense, posture, motor skill, proprioception of   [] LE: core, proximal hip and LE for self care, mobility, lifting, and ambulation/stair navigation    [x] UE / Cervical: cervical, postural,  scapular, scapulothoracic and UE control with self care, reaching, carrying, lifting, house/yardwork, driving, computer work    Manual Adjusted     Long Term Goals: To be achieved in: 6 weeks  1. Disability index score of <35 or less for the [address] [place] [address] to assist with reaching prior level of function. []? Progressing: []? Met: []? Not Met: []? Adjusted  2. Patient will demonstrate increased cervical AROM to WNL, joint mobility WNL to allow for proper joint functioning as indicated by patients Functional Deficits. []? Progressing: []? Met: []? Not Met: []? Adjusted  3. Patient will demonstrate negative oculomotor special testing/positional testing as indicated by patients Functional Deficits. []? Progressing: []? Met: []? Not Met: []? Adjusted  4. Patient will return to functional activities including transfers in/out of bed without increased symptoms or restriction. []? Progressing: []? Met: []? Not Met: []? Adjusted  5. The patient will remain fall-free from the start of PT services with ADLs and iADLs.  []? Progressing: []? Met: []? Not Met: []? Adjusted         Overall Progression Towards Functional goals/ Treatment Progress Update:  [] Patient is progressing as expected towards functional goals listed. [] Progression is slowed due to complexities/Impairments listed. [] Progression has been slowed due to co-morbidities. [x] Plan just implemented, too soon to assess goals progression <30days   [] Goals require adjustment due to lack of progress  [] Patient is not progressing as expected and requires additional follow up with physician  [] Other:     Persisting Functional Limitations/Impairments:  []Sleeping []Sitting               []Standing [x]Transfers        []Walking []Kneeling               [x]Stairs [x]Squatting / bending   [x]ADLs []Reaching  []Lifting  [x]Housework  [x]Driving [x]Job related tasks  []Sports/Recreation []Other:      ASSESSMENT:    The patient is progression with habituation activities with minimal symptom provocation. The patient is challenged with eye closed and head nods especially causing some head pressure. Folding forward does cause mild nausea . She will continue to benefit from a combination habituation, dynamic balance, and manual therapy reduce dizziness and restore normal cervical muscle tone. 10/21: The patient was able to performing habituation exercises in standing with minimal dizziness by provocation. Patient encouraged to consider drinking water 6-8 cups vs [facility]. Patient presented with trigger points in right upper trap and decrease OA movement. She will continue to benefit from Therapy to restore confidence with balance reducing dizziness and neck tension. T      Treatment/Activity Tolerance:  [x] Patient tolerated treatment well [] Patient limited by fatigue  [] Patient limited by pain  [] Patient limited by other medical complications  [] Other:     Prognosis: [] Good [x] Fair  [] Poor    Patient Requires Follow-up: [x] Yes  [] No    PLAN: POC: PT 2x / week for 6 weeks. Assess response to HEP. habituation (patient very sensitive), balance; cervical manual, cervical AROM, eventually tuck and lifts  [x] Continue per plan of care [] Alter current plan (see comments)  [] Plan of care initiated [] Hold pending MD visit [] Discharge    Electronically signed by: Angeles Sher PT, DPT, OMT-C    Note: If patient does not return for scheduled/ recommended follow up visits, this note will serve as a discharge from care along with most recent update on progress.

## 2020-10-28 ENCOUNTER — HOSPITAL ENCOUNTER (OUTPATIENT)
Dept: PHYSICAL THERAPY | Age: 65
Setting detail: THERAPIES SERIES
Discharge: HOME | End: 2020-10-28
Payer: MEDICARE

## 2020-10-28 PROCEDURE — 97140 MANUAL THERAPY 1/> REGIONS: CPT

## 2020-10-28 PROCEDURE — 97112 NEUROMUSCULAR REEDUCATION: CPT

## 2020-10-28 NOTE — FLOWSHEET NOTE
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  · Treatment Diagnosis: imbalance, dizziness, central vertigo  Insurance/Certification information:  PT Insurance Information: MEDICARE  Physician Information:  Referring Practitioner: Alejandro Webb MD  Plan of care signed (Y/N): []  Yes [x]  No     Progress Report: [x]  Yes  []  No     Date Range for reporting period:  Beginning 20   Ending TBD    Progress report due (10 Rx/or 30 days whichever is less): 10     Recertification due (POC duration/ or 90 days whichever is less): POC     Visit # Insurance Allowable Date Range (if applicable)    PMN 1098     Latex Allergy:  [x]NO      []YES  Preferred Language for Healthcare:   [x]English       []other:    Functional Scale: DHI: 52  Date assessed: 20    Pain level:  5/10     SUBJECTIVE:  Patient reports that she still having some dizziness , nausea, and neck stiffness. She states she is still unsure why this all happened in the first place. She is certainly improving and adapting strategies to reduce dizziness impairments. The patient reports her #1 strategy at quieting dizziness/nausea is to stop movement and to close her eyes. She finds if she continues to target something or move her head, that symptoms increase considerably. She states that rapid movement of her head or eyes increases symptoms considerably.     OBJECTIVE:   10/28 - cervical AROM 15deg L SB, 20deg R SB  10/14 - TUG = 10 sec; cervical flexion = 60deg (post manual)    RESTRICTIONS/PRECAUTIONS: N/A    Exercises/Interventions:     Therapeutic Exercises (64025)  Resistance Sets / Reps Notes   UT stretch  Cervical retraction  Supine Chin Tucks + Edu Neuromuscular Re-ed (16633) Therapeutic Activities (34217)      Close/Far Accommodation (1.5ft / 5ft)  10x 10/28 - added   Saccades (vert/horizontal)  2 x 10 ea 10/28 - standing; diff w/ R eye, symptoms of dizziness   VOR - 2ft / 6ft  1 min ea, horizontal/wrjmkout60/21 standingHABITUATION  Smooth Pursuit (seated) vert & horiz        Folding over to knees (habituation)    60'' trials, ea   10/9 pressure in back head 10/6 vert pressure of right ear; horiz pressure in left ear    10/23 nausea and dizziness     10/7 Dizziness & nausea     10/14 - patient instructed that she can close her eyes to indicate overstimulation and PT will allow her brief rest at that time; slight building of pressure in eye muscles     - VOR Cancellation - vert/horizontal  10x ea 10/28 - standing  10/21 -standing  10/14 - added   Turns Twist  Quarter turns   Sways A/P, M/L EO / EC  EC NBOS   Tandem R/L w/ head turns/pitches   10/14 - added   Gait : Head nod / Turns   Manual Intervention (60593)       cervical PROM (SB, flex, rot), cervical STM (UT/LS, scalenes), , UT/LS stretches  20 min                                    Patient Education & HEP:  Access Code: [email or web address]   URL: [email or web address]   Date: 10/28/2020   Prepared by: Aspen Tomas     Exercises   Standing VOR Cancellation - 60 seconds - 2x daily - 7x weekly     10/14 - added cervical tucks     - Patient educated on the focus of skilled physical therapy services and plan of care, including: diagnosis, prognosis, treatment goals & options. - The following exercises were added to the patient's home exercise program. (VOR, UT stretch, cervical retraction) Additionally, the patient was educated on appropriate frequency, intensity and duration.  A handout was provided  - All patient questions were answered    Therapeutic Exercise and NMR EXR  [] (18288) Provided verbal/tactile cueing for activities related to strengthening, flexibility, endurance, ROM for improvements in  [] LE / Lumbar: LE, proximal hip, and core control with self care, mobility, lifting, ambulation. [] UE / Cervical: cervical, postural, scapular, scapulothoracic and UE control with self care, reaching, carrying, lifting, house/yardwork, driving, computer work. [x] (38779) Provided verbal/tactile cueing for activities related to improving balance, coordination, kinesthetic sense, posture, motor skill, proprioception to assist with   [] LE / lumbar: LE, proximal hip, and core control in self care, mobility, lifting, ambulation and eccentric single leg control. [x] UE / cervical: cervical, scapular, scapulothoracic and UE control with self care, reaching, carrying, lifting, house/yardwork, driving, computer work.      NMR and Therapeutic Activities:    [x] (66133 or 86973) Provided verbal/tactile cueing for activities related to improving balance, coordination, kinesthetic sense, posture, motor skill, proprioception and motor activation to allow for proper function of   [] LE: / Lumbar core, proximal hip and LE with self care and ADLs  [x] UE / Cervical: cervical, postural, scapular, scapulothoracic and UE control with self care, carrying, lifting, driving, computer work.   [] (68601) Gait Re-education- Provided training and instruction to the patient for proper LE, core and proximal hip recruitment and positioning and eccentric body weight control with ambulation re-education including up and down stairs     Home Exercise Program:    [] (43483) Reviewed/Progressed HEP activities related to strengthening, flexibility, endurance, ROM of   [] LE / Lumbar: core, proximal hip and LE for functional self-care, mobility, lifting and ambulation/stair navigation   [] UE / Cervical: cervical, postural, scapular, scapulothoracic and UE control with self care, reaching, carrying, lifting, house/yardwork, driving, computer work  [x] (93457)Reviewed/Progressed HEP activities related to improving balance, coordination, kinesthetic sense, posture, motor skill, proprioception of   [] LE: core, proximal hip and LE for self care, mobility, lifting, and ambulation/stair navigation    [x] UE / Cervical: cervical, postural,  scapular, scapulothoracic and UE control with self care, reaching, carrying, lifting, house/yardwork, driving, computer work    Manual Treatments:  PROM / STM / Oscillations-Mobs:  G-I, II, III, IV (PA's, Inf., Post.)  [x] (29769) Provided manual therapy to mobilize LE, proximal hip and/or LS spine soft tissue/joints for the purpose of modulating pain, promoting relaxation,  increasing ROM, reducing/eliminating soft tissue swelling/inflammation/restriction, improving soft tissue extensibility and allowing for proper ROM for normal function with   [] LE / lumbar: self care, mobility, lifting and ambulation. [x] UE / Cervical: self care, reaching, carrying, lifting, house/yardwork, driving, computer work. Modalities:  [] (41092) Vasopneumatic compression: Utilized vasopneumatic compression to decrease edema / swelling for the purpose of improving mobility and quad tone / recruitment which will allow for increased overall function including but not limited to self-care, transfers, ambulation, and ascending / descending stairs. Modalities: Consider [facility] [place]    Charges:  Timed Code Treatment Minutes: 45   Total Treatment Minutes: 45     [] EVAL - LOW (42958)   [] EVAL - MOD (71391)  [] EVAL - HIGH (25648)  [] RE-EVAL (30614)  [] TE (48103) x      [] Ionto  [x] NMR (92316) x 2      [] Vaso  [x] Manual (97229) x  1    [] Ultrasound  [] TA x       [] Mech Traction (35706)  [] Gait Training x     [] ES (un) (05072):   [] Aquatic therapy x   [] Other:   [] Group:        GOALS:  Patient stated goal: TO ABOLISH DIZZINESS, TO PREVENT FALLS  [x]? Progressing: []? Met: []? Not Met: []? Adjusted     Therapist goals for Patient:    Short Term Goals: To be achieved in: 2 weeks  1.  Independent in HEP and progression per patient tolerance, in order to prevent re-injury. [x]? Progressing: []? Met: []? Not Met: []? Adjusted  2. Patient will have a decrease in dizziness/imbalance/symptoms to <2/10 on average to facilitate improvement in movement, function, balance and ADLs as indicated by Functional Deficits. []? Progressing: []? Met: [x]? Not Met: []? Adjusted     Long Term Goals: To be achieved in: 6 weeks  1. Disability index score of <35 or less for the [facility] to assist with reaching prior level of function. []? Progressing: []? Met: []? Not Met: []? Adjusted  2. Patient will demonstrate increased cervical AROM to WNL, joint mobility WNL to allow for proper joint functioning as indicated by patients Functional Deficits. []? Progressing: []? Met: []? Not Met: []? Adjusted  3. Patient will demonstrate negative oculomotor special testing/positional testing as indicated by patients Functional Deficits. []? Progressing: []? Met: []? Not Met: []? Adjusted  4. Patient will return to functional activities including transfers in/out of bed without increased symptoms or restriction. []? Progressing: []? Met: []? Not Met: []? Adjusted  5. The patient will remain fall-free from the start of PT services with ADLs and iADLs.  []? Progressing: []? Met: []? Not Met: []? Adjusted         Overall Progression Towards Functional goals/ Treatment Progress Update:  [] Patient is progressing as expected towards functional goals listed. [x] Progression is slowed due to complexities/Impairments listed. [] Progression has been slowed due to co-morbidities.   [x] Plan just implemented, too soon to assess goals progression <30days   [] Goals require adjustment due to lack of progress  [] Patient is not progressing as expected and requires additional follow up with physician  [] Other:     Persisting Functional Limitations/Impairments:  []Sleeping []Sitting               []Standing [x]Transfers        []Walking []Kneeling               [x]Stairs [x]Squatting / bending   [x]ADLs []Reaching  []Lifting  [x]Housework  [x]Driving [x]Job related tasks  []Sports/Recreation []Other:      ASSESSMENT:    The patient is still symptomatic with head turns/pitches and eye movements - resulting in brief moments where the patient must close her eyes due to pressure and fullness in right ear, head, neck. The patient has trigger points in R UT that may benefit from TPdn. This was discussed with patient and may be a consideration at future visits. The patient's progress is present, but slow at this time. T  Treatment/Activity Tolerance:  [x] Patient tolerated treatment well [] Patient limited by fatigue  [] Patient limited by pain  [] Patient limited by other medical complications  [] Other:     Prognosis: [] Good [x] Fair  [] Poor    Patient Requires Follow-up: [x] Yes  [] No    PLAN: POC: PT 2x / week for 6 weeks. Assess response to HEP. habituation (patient very sensitive), balance; cervical manual, cervical AROM, eventually tuck and lifts; discussion of pathophysiology behind central vertigo. [x] Continue per plan of care [] Alter current plan (see comments)  [] Plan of care initiated [] Hold pending MD visit [] Discharge    Electronically signed by: Jamaica Cordova PT, DPT, OMT-C    Note: If patient does not return for scheduled/ recommended follow up visits, this note will serve as a discharge from care along with most recent update on progress.

## 2020-10-30 ENCOUNTER — HOSPITAL ENCOUNTER (OUTPATIENT)
Dept: PHYSICAL THERAPY | Age: 65
Setting detail: THERAPIES SERIES
Discharge: HOME | End: 2020-10-30
Payer: MEDICARE

## 2020-10-30 PROCEDURE — 97112 NEUROMUSCULAR REEDUCATION: CPT

## 2020-10-30 PROCEDURE — 97140 MANUAL THERAPY 1/> REGIONS: CPT

## 2020-10-30 NOTE — FLOWSHEET NOTE
verbal/tactile cueing for activities related to improving balance, coordination, kinesthetic sense, posture, motor skill, proprioception to assist with   [] LE / lumbar: LE, proximal hip, and core control in self care, mobility, lifting, ambulation and eccentric single leg control. [x] UE / cervical: cervical, scapular, scapulothoracic and UE control with self care, reaching, carrying, lifting, house/yardwork, driving, computer work.      NMR and Therapeutic Activities:    [x] (49268 or 22557) Provided verbal/tactile cueing for activities related to improving balance, coordination, kinesthetic sense, posture, motor skill, proprioception and motor activation to allow for proper function of   [] LE: / Lumbar core, proximal hip and LE with self care and ADLs  [x] UE / Cervical: cervical, postural, scapular, scapulothoracic and UE control with self care, carrying, lifting, driving, computer work.   [] (81994) Gait Re-education- Provided training and instruction to the patient for proper LE, core and proximal hip recruitment and positioning and eccentric body weight control with ambulation re-education including up and down stairs     Home Exercise Program:    [] (11677) Reviewed/Progressed HEP activities related to strengthening, flexibility, endurance, ROM of   [] LE / Lumbar: core, proximal hip and LE for functional self-care, mobility, lifting and ambulation/stair navigation   [] UE / Cervical: cervical, postural, scapular, scapulothoracic and UE control with self care, reaching, carrying, lifting, house/yardwork, driving, computer work  [] (60204)Reviewed/Progressed HEP activities related to improving balance, coordination, kinesthetic sense, posture, motor skill, proprioception of   [] LE: core, proximal hip and LE for self care, mobility, lifting, and ambulation/stair navigation    [] UE / Cervical: cervical, postural,  scapular, scapulothoracic and UE control with self care, reaching, carrying, lifting, house/yardwork, driving, computer work    Manual Treatments:  PROM / STM / Oscillations-Mobs:  G-I, II, III, IV (PA's, Inf., Post.)  [x] (88889) Provided manual therapy to mobilize LE, proximal hip and/or LS spine soft tissue/joints for the purpose of modulating pain, promoting relaxation,  increasing ROM, reducing/eliminating soft tissue swelling/inflammation/restriction, improving soft tissue extensibility and allowing for proper ROM for normal function with   [] LE / lumbar: self care, mobility, lifting and ambulation. [x] UE / Cervical: self care, reaching, carrying, lifting, house/yardwork, driving, computer work. Modalities:  [] (89712) Vasopneumatic compression: Utilized vasopneumatic compression to decrease edema / swelling for the purpose of improving mobility and quad tone / recruitment which will allow for increased overall function including but not limited to self-care, transfers, ambulation, and ascending / descending stairs. Modalities: [place] [facility] [place]    Charges:  Timed Code Treatment Minutes: 40   Total Treatment Minutes: 40     [] EVAL - LOW (80850)   [] EVAL - MOD (06711)  [] EVAL - HIGH (72291)  [] RE-EVAL (96845)  [] TE (85860) x      [] Ionto  [x] NMR (91022) x 2      [] Vaso  [x] Manual (44981) x  1    [] Ultrasound  [] TA x       [] Mech Traction (40959)  [] Gait Training x     [] ES (un) (83474):   [] Aquatic therapy x   [] Other:   [] Group:        GOALS:  Patient stated goal: TO ABOLISH DIZZINESS, TO PREVENT FALLS  [x]? Progressing: []? Met: []? Not Met: []? Adjusted     Therapist goals for Patient:    Short Term Goals: To be achieved in: 2 weeks  1. Independent in HEP and progression per patient tolerance, in order to prevent re-injury. [x]? Progressing: []? Met: []? Not Met: []? Adjusted  2. Patient will have a decrease in dizziness/imbalance/symptoms to <2/10 on average to facilitate improvement in movement, function, balance and ADLs as indicated by Functional Deficits.   []? indicating cervicogenic influence on symptoms as well as central causation. Slow improvement in PT at this time. T  Treatment/Activity Tolerance:  [x] Patient tolerated treatment well [] Patient limited by fatigue  [] Patient limited by pain  [] Patient limited by other medical complications  [] Other:     Prognosis: [] Good [x] Fair  [] Poor    Patient Requires Follow-up: [x] Yes  [] No    PLAN: POC: PT 2x / week for 6 weeks. Assess response to HEP. habituation (patient very sensitive), balance; cervical manual, cervical AROM, eventually tuck and lifts; discussion of pathophysiology behind central vertigo. [x] Continue per plan of care [] Alter current plan (see comments)  [] Plan of care initiated [] Hold pending MD visit [] Discharge    Electronically signed by: Kaden Rubi PT, DPT, OMT-C    Therapist was present, directed the patient's care, made skilled judgement, and was responsible for assessment and treatment of the patient. Reymundo Ruiz - Physical Therapist Student    Note: If patient does not return for scheduled/ recommended follow up visits, this note will serve as a discharge from care along with most recent update on progress.

## 2020-11-04 ENCOUNTER — HOSPITAL ENCOUNTER (OUTPATIENT)
Dept: PHYSICAL THERAPY | Age: 65
Setting detail: THERAPIES SERIES
Discharge: HOME | End: 2020-11-04
Payer: MEDICARE

## 2020-11-04 PROCEDURE — 97530 THERAPEUTIC ACTIVITIES: CPT

## 2020-11-04 PROCEDURE — 97112 NEUROMUSCULAR REEDUCATION: CPT

## 2020-11-04 NOTE — FLOWSHEET NOTE
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.4 (ICD-10-CM) - Vertigo of central origin  · Treatment Diagnosis: imbalance, dizziness, central vertigo  Insurance/Certification information:  PT Insurance Information: MEDICARE  Physician Information:  Referring Practitioner: Lea Graham MD  Plan of care signed (Y/N): [x]  Yes []  No     Progress Report: []  Yes  [x]  No     Date Range for reporting period:  Beginning 20   Ending TBD    Progress report due (10 Rx/or 30 days whichever is less): 10     Recertification due (POC duration/ or 90 days whichever is less): POC     Visit # Insurance Allowable Date Range (if applicable)    PMN 5991     Latex Allergy:  [x]NO      []YES  Preferred Language for Healthcare:   [x]English       []other:    Functional Scale: DHI: 52  Date assessed: 20    Pain level:  5/10     SUBJECTIVE:  The patient comments she finds movement of others in her [employer] group causes her symptoms and she must turn away from him. 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 She is still trying HEP, but having symptoms daily. The patient reports her #1 strategy at quieting dizziness/nausea is to stop movement and to close her eyes. She finds if she continues to target something or move her head, that symptoms increase considerably. She states that rapid movement of her head or eyes increases symptoms considerably.     OBJECTIVE:    - HSN (+ for dizziness), Eitan-Hallpike (neg R/L)  10/28 - cervical AROM 15deg L SB, 20deg R SB  10/14 - TUG = 10 sec; cervical flexion = 60deg (post manual)    RESTRICTIONS/PRECAUTIONS: N/A    Exercises/Interventions:     Therapeutic Exercises (95686)  Resistance Sets / Reps Notes   UT stretch  Cervical retraction  Supine Chin Tucks + Edu                                                  Neuromuscular Re-ed (42768) Therapeutic Activities (25664)      Extensive patient education/discussion regarding vestibular vs cerebellar, ruling out positional BPPV, consideration of follow up with referring MD due to no significant improvement/change since starting PT. Special testing: raymond Cowart, HSN  30' 11/4 - added   Close/Far Accommodation (1.5ft / 5ft)  Saccades (vert/horizontal)  VOR - 2ft / 6ft  HABITUATION  Smooth Pursuit (seated) vert & horiz        Folding over to knees (habituation)   - VOR Cancellation - vert/horizontal  Turns Twist  Quarter turns   3x ea R/L11/4 - L>RSways A/P, M/L EO / EC  EC NBOS   Normal JOSE ANGEL Airex w/ head turns/pitches  Tandem R/L w/ head turns/pitches   10/14 - added   Tandem R/L Airex  Gait : Head nod / Turns   Biodex Balance:  Limits of Stability   Gaze Stabilizations:  Pitches/Turns    Pitches/Turns w/o stabilizations  Cone Pickup with Stabilization  4 cones 11/4 - dizziness/nausea upon standing   Manual Intervention (56726)       cervical PROM (SB, flex, rot), cervical STM (UT/LS, scalenes), , UT/LS stretches  10 min                                    Patient Education & HEP:  Access Code: [email or web address]   URL: [email or web address]   Date: 10/28/2020   Prepared by: Mk Camp     Exercises   Standing VOR Cancellation - 60 seconds - 2x daily - 7x weekly     10/14 - added cervical tucks     - Patient educated on the focus of skilled physical therapy services and plan of care, including: diagnosis, prognosis, treatment goals & options. - The following exercises were added to the patient's home exercise program. (VOR, UT stretch, cervical retraction) Additionally, the patient was educated on appropriate frequency, intensity and duration.  A handout was provided  - All patient questions were answered    Therapeutic Exercise and NMR EXR  [] (78752) Provided verbal/tactile cueing for activities related to strengthening, flexibility, endurance, ROM for improvements in  [] LE / Lumbar: LE, proximal hip, and core control with self care, mobility, lifting, ambulation. [] UE / Cervical: cervical, postural, scapular, scapulothoracic and UE control with self care, reaching, carrying, lifting, house/yardwork, driving, computer work. [x] (76783) Provided verbal/tactile cueing for activities related to improving balance, coordination, kinesthetic sense, posture, motor skill, proprioception to assist with   [] LE / lumbar: LE, proximal hip, and core control in self care, mobility, lifting, ambulation and eccentric single leg control. [x] UE / cervical: cervical, scapular, scapulothoracic and UE control with self care, reaching, carrying, lifting, house/yardwork, driving, computer work.      NMR and Therapeutic Activities:    [x] (33956 or 33168) Provided verbal/tactile cueing for activities related to improving balance, coordination, kinesthetic sense, posture, motor skill, proprioception and motor activation to allow for proper function of   [] LE: / Lumbar core, proximal hip and LE with self care and ADLs  [x] UE / Cervical: cervical, postural, scapular, scapulothoracic and UE control with self care, carrying, lifting, driving, computer work.   [] (52879) Gait Re-education- Provided training and instruction to the patient for proper LE, core and proximal hip recruitment and positioning and eccentric body weight control with ambulation re-education including up and down stairs     Home Exercise Program:    [] (13570) Reviewed/Progressed HEP activities related to strengthening, flexibility, endurance, ROM of   [] LE / Lumbar: core, proximal hip and LE for functional self-care, mobility, lifting and ambulation/stair navigation   [] UE / Cervical: cervical, postural, scapular, scapulothoracic and UE control with self care, reaching, carrying, lifting, house/yardwork, driving, computer work  [] (60992)Reviewed/Progressed HEP activities related to improving balance, coordination, kinesthetic sense, posture, motor skill, proprioception of   [] LE: core, proximal hip and LE for self care, mobility, lifting, and ambulation/stair navigation    [] UE / Cervical: cervical, postural,  scapular, scapulothoracic and UE control with self care, reaching, carrying, lifting, house/yardwork, driving, computer work    Manual Treatments:  PROM / STM / Oscillations-Mobs:  G-I, II, III, IV (PA's, Inf., Post.)  [] ([id number]) Provided manual therapy to mobilize LE, proximal hip and/or LS spine soft tissue/joints for the purpose of modulating pain, promoting relaxation,  increasing ROM, reducing/eliminating soft tissue swelling/inflammation/restriction, improving soft tissue extensibility and allowing for proper ROM for normal function with   [] LE / lumbar: self care, mobility, lifting and ambulation. [] UE / Cervical: self care, reaching, carrying, lifting, house/yardwork, driving, computer work. Modalities:  [] (20311) Vasopneumatic compression: Utilized vasopneumatic compression to decrease edema / swelling for the purpose of improving mobility and quad tone / recruitment which will allow for increased overall function including but not limited to self-care, transfers, ambulation, and ascending / descending stairs. Modalities: Consider [facility]    Charges:  Timed Code Treatment Minutes: 40   Total Treatment Minutes: 40     [] EVAL - LOW (98111)   [] EVAL - MOD (35407)  [] EVAL - HIGH (80694)  [] RE-EVAL (67079)  [] TE (19187) x      [] Ionto  [x] NMR (23256) x 2      [] Vaso  [] Manual (64320) x     [] Ultrasound  [x] TA x 1       [] Mech Traction (91169)  [] Gait Training x     [] ES (un) (31341):   [] Aquatic therapy x   [] Other:   [] Group:        GOALS:  Patient stated goal: TO ABOLISH DIZZINESS, TO PREVENT FALLS  [x]? Progressing: []? Met: []? Not Met: []?  Adjusted     Therapist goals for Patient: Limitations/Impairments:  []Sleeping []Sitting               []Standing [x]Transfers        []Walking []Kneeling               [x]Stairs [x]Squatting / bending   [x]ADLs []Reaching  []Lifting  [x]Housework  [x]Driving [x]Job related tasks  []Sports/Recreation []Other:      ASSESSMENT:   The patient is still very limited by symptoms of dizziness, nausea and imbalance. The patient will continue with 1 week of PT, and will consider follow up with MD at that time. Treatment/Activity Tolerance:  [] Patient tolerated treatment well [] Patient limited by fatigue  [] Patient limited by pain  [x] Patient limited by other medical complications  [] Other:     Prognosis: [] Good [x] Fair  [] Poor    Patient Requires Follow-up: [x] Yes  [] No    PLAN: POC: PT 2x / week for 6 weeks. Assess response to HEP. habituation (patient very sensitive), balance; cervical manual, cervical AROM, eventually tuck and lifts;  [x] Continue per plan of care [] Alter current plan (see comments)  [] Plan of care initiated [] Hold pending MD visit [] Discharge    Electronically signed by: Elias Pacheco PT, DPT, OMT-C    Note: If patient does not return for scheduled/ recommended follow up visits, this note will serve as a discharge from care along with most recent update on progress.

## 2020-11-06 ENCOUNTER — HOSPITAL ENCOUNTER (OUTPATIENT)
Dept: PHYSICAL THERAPY | Age: 65
Setting detail: THERAPIES SERIES
Discharge: HOME | End: 2020-11-06
Payer: MEDICARE

## 2020-11-06 PROCEDURE — 97530 THERAPEUTIC ACTIVITIES: CPT

## 2020-11-06 PROCEDURE — 97112 NEUROMUSCULAR REEDUCATION: CPT

## 2020-11-06 NOTE — FLOWSHEET NOTE
[facility] - Outpatient Physical Therapy    Physical Therapy Daily Treatment Note  Date:  2020    Patient Name:  Yelitza Collins    :  1955  MRN: [id number]  Medical/Treatment Diagnosis Information:  · Diagnosis: H81.4 (ICD-10-CM) - Vertigo of central origin  · Treatment Diagnosis: imbalance, dizziness, central vertigo  Insurance/Certification information:  PT Insurance Information: MEDICARE  Physician Information:  Referring Practitioner: Lea Graham MD  Plan of care signed (Y/N): [x]  Yes []  No     Progress Report: []  Yes  [x]  No     Date Range for reporting period:  Beginning 20   Ending TBD    Progress report due (10 Rx/or 30 days whichever is less): 10     Recertification due (POC duration/ or 90 days whichever is less): POC     Visit # Insurance Allowable Date Range (if applicable)    PMN 8210     Latex Allergy:  [x]NO      []YES  Preferred Language for Healthcare:   [x]English       []other:    Functional Scale: DHI: 52  Date assessed: 20    Pain level:  6-7/10     SUBJECTIVE:  The patient woke with symptoms today; pressure in her head and eyes. She comments she had some stumbles stepping through threshold into kitchen yesterday, always to the left. OBJECTIVE:    - TUG = 12 sec   - HSN (+ for dizziness), Eitan-Hallpike (neg R/L)  10/28 - cervical AROM 15deg L SB, 20deg R SB  10/14 - TUG = 10 sec; cervical flexion = 60deg (post manual)    RESTRICTIONS/PRECAUTIONS: N/A    Exercises/Interventions:     Therapeutic Exercises (34607)  Resistance Sets / Reps Notes   UT stretch  Cervical retraction  Supine Chin Tucks + Edu                                                  Neuromuscular Re-ed (25054) Therapeutic Activities (76447)      Extensive patient education/discussion regarding vestibular vs cerebellar, ruling out positional BPPV, consideration of follow up with referring MD due to no significant improvement/change since starting PT.  Special testing: Haapril, raymond hallpike, HSN  Close/Far Accommodation (1.5ft / 5ft)  Saccades (vert/horizontal)  VOR - 2ft / 6ft  HABITUATION  Smooth Pursuit (seated) vert & horiz        Folding over to knees (habituation)   - VOR Cancellation - vert/horizontal  Corrective Saccades seated 2 x 6, horizontal  1 x 6 vertical 11/6 - added   Turns Twist  Quarter turns   3x ea R/L11/4 - L>RSways A/P, M/L EO / EC  EC NBOS   Normal JOSE ANGEL Airex w/ head turns/pitches  Tandem R/L w/ head turns/pitches   10/14 - added   Tandem R/L Airex  Gait : Head nod / Turns   Biodex Balance:  Limits of Stability    Random Control   L11   1x    2 min Gaze Stabilizations:  Pitches/Turns    Pitches/Turns w/o stabilizations    50ft x 2 ea    50ft x 2 ea Walking w/ 360 spin  2 laps, 3 spins  11/6 - added   Cone Put Down/Pickup with Stabilization  4 cones retro/fwd    Manual Intervention (62312)       cervical PROM (SB, flex, rot), cervical STM (UT/LS, scalenes), , UT/LS stretches                                      Patient Education & HEP:  Access Code: [email or web address]   URL: [email or web address]   Date: 10/28/2020   Prepared by: Kunal Matt     Exercises   Standing VOR Cancellation - 60 seconds - 2x daily - 7x weekly     10/14 - added cervical tucks     - Patient educated on the focus of skilled physical therapy services and plan of care, including: diagnosis, prognosis, treatment goals & options. - The following exercises were added to the patient's home exercise program. (VOR, UT stretch, cervical retraction) Additionally, the patient was educated on appropriate frequency, intensity and duration. A handout was provided  - All patient questions were answered    Therapeutic Exercise and NMR EXR  [] (04792) Provided verbal/tactile cueing for activities related to strengthening, flexibility, endurance, ROM for improvements in  [] LE / Lumbar: LE, proximal hip, and core control with self care, mobility, lifting, ambulation.   [] UE / Cervical: cervical, postural, scapular, scapulothoracic and UE control with self care, reaching, carrying, lifting, house/yardwork, driving, computer work. [x] (04769) Provided verbal/tactile cueing for activities related to improving balance, coordination, kinesthetic sense, posture, motor skill, proprioception to assist with   [] LE / lumbar: LE, proximal hip, and core control in self care, mobility, lifting, ambulation and eccentric single leg control. [x] UE / cervical: cervical, scapular, scapulothoracic and UE control with self care, reaching, carrying, lifting, house/yardwork, driving, computer work.      NMR and Therapeutic Activities:    [x] (94772 or 52812) Provided verbal/tactile cueing for activities related to improving balance, coordination, kinesthetic sense, posture, motor skill, proprioception and motor activation to allow for proper function of   [] LE: / Lumbar core, proximal hip and LE with self care and ADLs  [x] UE / Cervical: cervical, postural, scapular, scapulothoracic and UE control with self care, carrying, lifting, driving, computer work.   [] (01038) Gait Re-education- Provided training and instruction to the patient for proper LE, core and proximal hip recruitment and positioning and eccentric body weight control with ambulation re-education including up and down stairs     Home Exercise Program:    [] (60897) Reviewed/Progressed HEP activities related to strengthening, flexibility, endurance, ROM of   [] LE / Lumbar: core, proximal hip and LE for functional self-care, mobility, lifting and ambulation/stair navigation   [] UE / Cervical: cervical, postural, scapular, scapulothoracic and UE control with self care, reaching, carrying, lifting, house/yardwork, driving, computer work  [] (22816)Reviewed/Progressed HEP activities related to improving balance, coordination, kinesthetic sense, posture, motor skill, proprioception of   [] LE: core, proximal hip and LE for self care, mobility, lifting, and ambulation/stair navigation    [] UE / Cervical: cervical, postural,  scapular, scapulothoracic and UE control with self care, reaching, carrying, lifting, house/yardwork, driving, computer work    Manual Treatments:  PROM / STM / Oscillations-Mobs:  G-I, II, III, IV (PA's, Inf., Post.)  [] (32511) Provided manual therapy to mobilize LE, proximal hip and/or LS spine soft tissue/joints for the purpose of modulating pain, promoting relaxation,  increasing ROM, reducing/eliminating soft tissue swelling/inflammation/restriction, improving soft tissue extensibility and allowing for proper ROM for normal function with   [] LE / lumbar: self care, mobility, lifting and ambulation. [] UE / Cervical: self care, reaching, carrying, lifting, house/yardwork, driving, computer work. Modalities:  [] (39552) Vasopneumatic compression: Utilized vasopneumatic compression to decrease edema / swelling for the purpose of improving mobility and quad tone / recruitment which will allow for increased overall function including but not limited to self-care, transfers, ambulation, and ascending / descending stairs. Modalities: Consider [facility]    Charges:  Timed Code Treatment Minutes: 40   Total Treatment Minutes: 40     [] EVAL - LOW (22035)   [] EVAL - MOD (30451)  [] EVAL - HIGH (61117)  [] RE-EVAL (52124)  [] TE (19088) x      [] Ionto  [x] NMR (83566) x 2      [] Vaso  [] Manual (98502) x      [] Ultrasound  [x] TA x 1       [] Mech Traction (48430)  [] Gait Training x     [] ES (un) (84735):   [] Aquatic therapy x   [] Other:   [] Group:        GOALS:  Patient stated goal: TO ABOLISH DIZZINESS, TO PREVENT FALLS  [x]? Progressing: []? Met: []? Not Met: []? Adjusted     Therapist goals for Patient:    Short Term Goals: To be achieved in: 2 weeks  1. Independent in HEP and progression per patient tolerance, in order to prevent re-injury. [x]? Progressing: []? Met: []? Not Met: []? Adjusted  2.  Patient will have a decrease in dizziness/imbalance/symptoms to <2/10 on average to facilitate improvement in movement, function, balance and ADLs as indicated by Functional Deficits. []? Progressing: []? Met: [x]? Not Met: []? Adjusted     Long Term Goals: To be achieved in: 6 weeks  1. Disability index score of <35 or less for the [facility] to assist with reaching prior level of function. []? Progressing: []? Met: []? Not Met: []? Adjusted  2. Patient will demonstrate increased cervical AROM to WNL, joint mobility WNL to allow for proper joint functioning as indicated by patients Functional Deficits. []? Progressing: []? Met: []? Not Met: []? Adjusted  3. Patient will demonstrate negative oculomotor special testing/positional testing as indicated by patients Functional Deficits. []? Progressing: []? Met: []? Not Met: []? Adjusted  4. Patient will return to functional activities including transfers in/out of bed without increased symptoms or restriction. []? Progressing: []? Met: []? Not Met: []? Adjusted  5. The patient will remain fall-free from the start of PT services with ADLs and iADLs.  []? Progressing: []? Met: []? Not Met: []? Adjusted         Overall Progression Towards Functional goals/ Treatment Progress Update:  [] Patient is progressing as expected towards functional goals listed. [x] Progression is slowed due to complexities/Impairments listed. [] Progression has been slowed due to co-morbidities.   [x] Plan just implemented, too soon to assess goals progression <30days   [] Goals require adjustment due to lack of progress  [] Patient is not progressing as expected and requires additional follow up with physician  [] Other:     Persisting Functional Limitations/Impairments:  []Sleeping []Sitting               []Standing [x]Transfers        []Walking []Kneeling               [x]Stairs [x]Squatting / bending   [x]ADLs []Reaching  []Lifting  [x]Housework  [x]Driving [x]Job related tasks  []Sports/Recreation []Other:      ASSESSMENT:   The patient is still very limited by symptoms of dizziness, nausea and imbalance. She had difficulty with corrective saccades, but performed them slowly. The patient is approaching her final 2 visits of PT and will likely transition to HEP at that time, as well as follow up with ENT. Treatment/Activity Tolerance:  [] Patient tolerated treatment well [] Patient limited by fatigue  [] Patient limited by pain  [x] Patient limited by other medical complications  [] Other:     Prognosis: [] Good [x] Fair  [] Poor    Patient Requires Follow-up: [x] Yes  [] No    PLAN: POC: PT 2x / week for 6 weeks. Assess response to HEP. habituation (patient very sensitive), balance; cervical manual, cervical AROM, eventually tuck and lifts;  [x] Continue per plan of care [] Alter current plan (see comments)  [] Plan of care initiated [] Hold pending MD visit [] Discharge    Electronically signed by: Pauline Crigler PT, DPT, OMT-C    Note: If patient does not return for scheduled/ recommended follow up visits, this note will serve as a discharge from care along with most recent update on progress.

## 2020-11-11 ENCOUNTER — HOSPITAL ENCOUNTER (OUTPATIENT)
Dept: PHYSICAL THERAPY | Age: 65
Setting detail: THERAPIES SERIES
Discharge: HOME | End: 2020-11-11
Payer: MEDICARE

## 2020-11-11 PROCEDURE — 97530 THERAPEUTIC ACTIVITIES: CPT

## 2020-11-11 PROCEDURE — 97112 NEUROMUSCULAR REEDUCATION: CPT

## 2020-11-11 NOTE — FLOWSHEET NOTE
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 (ICD-10-CM) - Vertigo of central origin  · Treatment Diagnosis: imbalance, dizziness, central vertigo  Insurance/Certification information:  PT Insurance Information: MEDICARE  Physician Information:  Referring Practitioner: Geetha Tierney MD  Plan of care signed (Y/N): [x]  Yes []  No     Progress Report: []  Yes  [x]  No     Date Range for reporting period:  Beginning 20   Ending TBD    Progress report due (10 Rx/or 30 days whichever is less): 10     Recertification due (POC duration/ or 90 days whichever is less): POC     Visit # Insurance Allowable Date Range (if applicable)   2020     Latex Allergy:  [x]NO      []YES  Preferred Language for Healthcare:   [x]English       []other:    Functional Scale:   DHI 36     Date assessed :20   DHI: 52  Date assessed: 20    Pain level:  6-7/10     SUBJECTIVE:  The patient reports that her dizziness has remained the same especially in the mornings. States she has not been nauseated for about week. Bending and rapid head turns provokes dizziness . Says her dizziness remains for about 3-5 mins.       OBJECTIVE:  NDI Assessment  - TUG = 12 sec   - HSN (+ for dizziness), [place] (neg R/L)  10/28 - cervical AROM 15deg L SB, 20deg R SB  10/14 - TUG = 10 sec; cervical flexion = 60deg (post manual)    RESTRICTIONS/PRECAUTIONS: N/A    Exercises/Interventions:     Therapeutic Exercises (16193)  Resistance Sets / Reps Notes   UT stretch  Cervical retraction  Supine Chin Tucks + Edu                                                  Neuromuscular Re-ed (37635) Therapeutic Activities (46658)      Extensive patient education/discussion regarding vestibular vs cerebellar, ruling out positional BPPV, consideration of follow up with referring MD due to no significant improvement/change since starting PT. Special testing: [place], raymond hallpike, HSN  Close/Far Accommodation (1.5ft / 5ft)  Saccades (vert/horizontal)  VOR -  6ft  10HABITUATION  Smooth Pursuit (seated) vert & horiz        Folding over to knees (habituation)   - VOR Cancellation - vert/horizontal  Corrective Saccades seated 2 x 6, horizontal  1 x 6 vertical 11/6 - added   Turns Twist  Quarter turns   3x ea R/L11/4 - L>RSways A/P, M/L EO / EC  EC NBOS   Normal JOSE ANGEL Airex w/ head turns/pitches  Tandem R/L w/ head turns/pitches   10/14 - added   Tandem R/L Airex  Gait : Head nod / Turns   Biodex Balance:  Limits of Stability    Random Control   L11    Gaze Stabilizations:  Pitches/Turns    Pitches/Turns w/o stabilizations    50ft x 2 ea    50ft x 1 ea Walking w/ 360 spin   11/6 - added   Cone Put Down/Pickup with Stabilization  5 cones retro/fwd    Manual Intervention (81957)       cervical PROM (SB, flex, rot), cervical STM (UT/LS, scalenes), , UT/LS stretches                                      Patient Education & HEP:  Access Code: [email or web address]   URL: [email or web address]   Date: 10/28/2020   Prepared by: Gloria Lance     Exercises   Standing VOR Cancellation - 60 seconds - 2x daily - 7x weekly     10/14 - added cervical tucks     - Patient educated on the focus of skilled physical therapy services and plan of care, including: diagnosis, prognosis, treatment goals & options. - The following exercises were added to the patient's home exercise program. (VOR, UT stretch, cervical retraction) Additionally, the patient was educated on appropriate frequency, intensity and duration.  A handout was provided  - All patient questions were answered    Therapeutic Exercise and NMR EXR  [] (00137) Provided verbal/tactile cueing for activities related to strengthening, flexibility, endurance, ROM for improvements in  [] LE / Lumbar: LE, proximal hip, and core control with self care, mobility, lifting, ambulation. [] UE / Cervical: cervical, postural, scapular, scapulothoracic and UE control with self care, reaching, carrying, lifting, house/yardwork, driving, computer work. [x] (92900) Provided verbal/tactile cueing for activities related to improving balance, coordination, kinesthetic sense, posture, motor skill, proprioception to assist with   [] LE / lumbar: LE, proximal hip, and core control in self care, mobility, lifting, ambulation and eccentric single leg control. [x] UE / cervical: cervical, scapular, scapulothoracic and UE control with self care, reaching, carrying, lifting, house/yardwork, driving, computer work.      NMR and Therapeutic Activities:    [x] (78645 or 82890) Provided verbal/tactile cueing for activities related to improving balance, coordination, kinesthetic sense, posture, motor skill, proprioception and motor activation to allow for proper function of   [] LE: / Lumbar core, proximal hip and LE with self care and ADLs  [x] UE / Cervical: cervical, postural, scapular, scapulothoracic and UE control with self care, carrying, lifting, driving, computer work.   [] (03948) Gait Re-education- Provided training and instruction to the patient for proper LE, core and proximal hip recruitment and positioning and eccentric body weight control with ambulation re-education including up and down stairs     Home Exercise Program:    [] (89623) Reviewed/Progressed HEP activities related to strengthening, flexibility, endurance, ROM of   [] LE / Lumbar: core, proximal hip and LE for functional self-care, mobility, lifting and ambulation/stair navigation   [] UE / Cervical: cervical, postural, scapular, scapulothoracic and UE control with self care, reaching, carrying, lifting, house/yardwork, driving, computer work  [] (41894)Reviewed/Progressed HEP activities related to improving balance, coordination, kinesthetic order to prevent re-injury. [x]? Progressing: []? Met: []? Not Met: []? Adjusted  2. Patient will have a decrease in dizziness/imbalance/symptoms to <2/10 on average to facilitate improvement in movement, function, balance and ADLs as indicated by Functional Deficits. []? Progressing: []? Met: [x]? Not Met: []? Adjusted     Long Term Goals: To be achieved in: 6 weeks  1. Disability index score of <35 or less for the [facility] to assist with reaching prior level of function. [x]? Progressing: []? Met: []? Not Met: []? Adjusted  2. Patient will demonstrate increased cervical AROM to WNL, joint mobility WNL to allow for proper joint functioning as indicated by patients Functional Deficits. [x]? Progressing: []? Met: []? Not Met: []? Adjusted  3. Patient will demonstrate negative oculomotor special testing/positional testing as indicated by patients Functional Deficits. [x]? Progressing: []? Met: []? Not Met: []? Adjusted  4. Patient will return to functional activities including transfers in/out of bed without increased symptoms or restriction. [x]? Progressing: []? Met: []? Not Met: []? Adjusted  5. The patient will remain fall-free from the start of PT services with ADLs and IADLs.  []? Progressing: [x]? Met: []? Not Met: []? Adjusted         Overall Progression Towards Functional goals/ Treatment Progress Update:  [] Patient is progressing as expected towards functional goals listed. [x] Progression is slowed due to complexities/Impairments listed. [] Progression has been slowed due to co-morbidities.   [x] Plan just implemented, too soon to assess goals progression <30days   [] Goals require adjustment due to lack of progress  [] Patient is not progressing as expected and requires additional follow up with physician  [] Other:     Persisting Functional Limitations/Impairments:  []Sleeping []Sitting               []Standing [x]Transfers        []Walking []Kneeling               []Stairs [x]Squatting / bending   [x]ADLs []Reaching  []Lifting  [x]Housework  []Driving [x]Job related tasks  []Sports/Recreation []Other:      ASSESSMENT:   The patient has mentioned that therapy has helped with balance some overall. However , she continues to have dizziness in the mornings with some spinning at times. Her dizziness is also provoked rapid head movement . Patient would like to follow up with Dr. Lily Kerns ENT to discuss further management and testing as tolerated. Patient will be discharged with HEP until further notice per Dr Lily Kerns. Treatment/Activity Tolerance:  [] Patient tolerated treatment well [] Patient limited by fatigue  [] Patient limited by pain  [x] Patient limited by other medical complications  [] Other:     Prognosis: [] Good [x] Fair  [] Poor    Patient Requires Follow-up: [x] Yes  [] No    PLAN: POC: PT 2x / week for 6 weeks. Assess response to HEP. habituation (patient very sensitive), balance; cervical manual, cervical AROM, eventually tuck and lifts;  [x] Continue per plan of care [] Alter current plan (see comments)  [] Plan of care initiated [] Hold pending MD visit [] Discharge    Electronically signed by: Stuart Coulter PT, DPT, OMT-C    Note: If patient does not return for scheduled/ recommended follow up visits, this note will serve as a discharge from care along with most recent update on progress.

## 2020-11-13 ENCOUNTER — HOSPITAL ENCOUNTER (OUTPATIENT)
Dept: PHYSICAL THERAPY | Age: 65
Setting detail: THERAPIES SERIES
Discharge: HOME | End: 2020-11-13
Payer: MEDICARE

## 2020-11-13 PROCEDURE — 97112 NEUROMUSCULAR REEDUCATION: CPT

## 2020-11-13 PROCEDURE — 97530 THERAPEUTIC ACTIVITIES: CPT

## 2020-11-13 NOTE — FLOWSHEET NOTE
[facility] - Outpatient Physical Therapy     Physical Therapy Discharge Summary    Dear Francisco العلي    We had the pleasure of treating the following patient for physical therapy services at [facility]. A summary of our findings can be found in the discharge summary below. If you have any questions or concerns regarding these findings, please do not hesitate to contact me at the office phone number checked above. Thank you for the referral.     Physician Signature:________________________________Date:__________________  By signing above (or electronic signature), therapists plan is approved by physician      Functional Outcome: DHI 36        Overall Response to Treatment:   []Patient is responding well to treatment and improvement is noted with regards  to goals   []Patient should continue to improve in reasonable time if they continue HEP   []Patient has plateaued and is no longer responding to skilled PT intervention    []Patient is getting worse and would benefit from return to referring MD   []Patient unable to adhere to initial POC   [x]Other:Patient has made small gains with therapy      Date range of Visits: 20 to 20  Total Visits: 11    Recommendation:    [x] Discharge to [facility]. Follow up with PT PRN.        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.4 (ICD-10-CM) - Vertigo of central origin  · Treatment Diagnosis: imbalance, dizziness, central vertigo  Insurance/Certification information:  PT Insurance Information: MEDICARE  Physician Information:  Referring Practitioner: Radha Hernandez MD  Plan of care signed (Y/N): [x]  Yes []  No     Progress Report: []  Yes  [x]  No     Date Range for reporting period:  Beginning 20   Ending 20    Progress report head turns/pitches   10/14 - added   Tandem R/L Airex  Gait : Head nod / Turns   Biodex Balance:  Limits of Stability    Random Control   L11   1x Gaze Stabilizations:  Pitches/Turns    Pitches/Turns w/o stabilizations    50ft x 2 ea    50ft x 1 ea Walking w/ 360 spin   11/6 - added   Cone Put Down/Pickup with Stabilization  5 cones retro/fwd    Manual Intervention (47244)       cervical PROM (SB, flex, rot), cervical STM (UT/LS, scalenes), , UT/LS stretches                                      Patient Education & HEP:  Access Code: [email or web address]   URL: Medication Review/   Date: 10/28/2020   Prepared by: Carly Malone     Exercises   Standing VOR Cancellation - 60 seconds - 2x daily - 7x weekly     10/14 - added cervical tucks     - Patient educated on the focus of skilled physical therapy services and plan of care, including: diagnosis, prognosis, treatment goals & options. - The following exercises were added to the patient's home exercise program. (VOR, UT stretch, cervical retraction) Additionally, the patient was educated on appropriate frequency, intensity and duration. A handout was provided  - All patient questions were answered    Therapeutic Exercise and NMR EXR  [] (80484) Provided verbal/tactile cueing for activities related to strengthening, flexibility, endurance, ROM for improvements in  [] LE / Lumbar: LE, proximal hip, and core control with self care, mobility, lifting, ambulation. [] UE / Cervical: cervical, postural, scapular, scapulothoracic and UE control with self care, reaching, carrying, lifting, house/yardwork, driving, computer work. [x] (77036) Provided verbal/tactile cueing for activities related to improving balance, coordination, kinesthetic sense, posture, motor skill, proprioception to assist with   [] LE / lumbar: LE, proximal hip, and core control in self care, mobility, lifting, ambulation and eccentric single leg control.    [x] UE / cervical: cervical, scapular, scapulothoracic and UE control with self care, reaching, carrying, lifting, house/yardwork, driving, computer work.      NMR and Therapeutic Activities:    [x] (11274 or 89999) Provided verbal/tactile cueing for activities related to improving balance, coordination, kinesthetic sense, posture, motor skill, proprioception and motor activation to allow for proper function of   [] LE: / Lumbar core, proximal hip and LE with self care and ADLs  [x] UE / Cervical: cervical, postural, scapular, scapulothoracic and UE control with self care, carrying, lifting, driving, computer work.   [] (52834) Gait Re-education- Provided training and instruction to the patient for proper LE, core and proximal hip recruitment and positioning and eccentric body weight control with ambulation re-education including up and down stairs     Home Exercise Program:    [] (58940) Reviewed/Progressed HEP activities related to strengthening, flexibility, endurance, ROM of   [] LE / Lumbar: core, proximal hip and LE for functional self-care, mobility, lifting and ambulation/stair navigation   [] UE / Cervical: cervical, postural, scapular, scapulothoracic and UE control with self care, reaching, carrying, lifting, house/yardwork, driving, computer work  [] (88330)Reviewed/Progressed HEP activities related to improving balance, coordination, kinesthetic sense, posture, motor skill, proprioception of   [] LE: core, proximal hip and LE for self care, mobility, lifting, and ambulation/stair navigation    [] UE / Cervical: cervical, postural,  scapular, scapulothoracic and UE control with self care, reaching, carrying, lifting, house/yardwork, driving, computer work    Manual Treatments:  PROM / STM / Oscillations-Mobs:  G-I, II, III, IV (PA's, Inf., Post.)  [] (84475) Provided manual therapy to mobilize LE, proximal hip and/or LS spine soft tissue/joints for the purpose of modulating pain, promoting relaxation,  increasing ROM, reducing/eliminating soft tissue swelling/inflammation/restriction, improving soft tissue extensibility and allowing for proper ROM for normal function with   [] LE / lumbar: self care, mobility, lifting and ambulation. [] UE / Cervical: self care, reaching, carrying, lifting, house/yardwork, driving, computer work. Modalities:  [] (90256) Vasopneumatic compression: Utilized vasopneumatic compression to decrease edema / swelling for the purpose of improving mobility and quad tone / recruitment which will allow for increased overall function including but not limited to self-care, transfers, ambulation, and ascending / descending stairs. Modalities: Consider [facility]    Charges:  Timed Code Treatment Minutes: 43   Total Treatment Minutes: 43     [] EVAL - LOW (23034)   [] EVAL - MOD (51033)  [] EVAL - HIGH (71197)  [] RE-EVAL (10188)  [] TE (97696) x      [] Ionto  [x] NMR (76762) x 2      [] Vaso  [] Manual (57896) x      [] Ultrasound  [x] TA x 1       [] Mech Traction (24298)  [] Gait Training x     [] ES (un) (73028):   [] Aquatic therapy x   [] Other:   [] Group:        GOALS:  Patient stated goal: TO ABOLISH DIZZINESS, TO PREVENT FALLS  [x]? Progressing: []? Met: []? Not Met: []? Adjusted     Therapist goals for Patient:    Short Term Goals: To be achieved in: 2 weeks  1. Independent in HEP and progression per patient tolerance, in order to prevent re-injury. [x]? Progressing: []? Met: []? Not Met: []? Adjusted  2. Patient will have a decrease in dizziness/imbalance/symptoms to <2/10 on average to facilitate improvement in movement, function, balance and ADLs as indicated by Functional Deficits. []? Progressing: []? Met: [x]? Not Met: []? Adjusted     Long Term Goals: To be achieved in: 6 weeks  1. Disability index score of <35 or less for the [facility] to assist with reaching prior level of function. [x]? Progressing: []? Met: []? Not Met: []? Adjusted  2.  Patient will demonstrate increased cervical AROM to WNL, joint mobility WNL to allow for proper joint functioning as indicated by patients Functional Deficits. [x]? Progressing: []? Met: []? Not Met: []? Adjusted  3. Patient will demonstrate negative oculomotor special testing/positional testing as indicated by patients Functional Deficits. [x]? Progressing: []? Met: []? Not Met: []? Adjusted  4. Patient will return to functional activities including transfers in/out of bed without increased symptoms or restriction. [x]? Progressing: []? Met: []? Not Met: []? Adjusted  5. The patient will remain fall-free from the start of PT services with ADLs and IADLs.  []? Progressing: [x]? Met: []? Not Met: []? Adjusted         Overall Progression Towards Functional goals/ Treatment Progress Update:  [] Patient is progressing as expected towards functional goals listed. [x] Progression is slowed due to complexities/Impairments listed. [] Progression has been slowed due to co-morbidities. [x] Plan just implemented, too soon to assess goals progression <30days   [] Goals require adjustment due to lack of progress  [] Patient is not progressing as expected and requires additional follow up with physician  [] Other:     Persisting Functional Limitations/Impairments:  []Sleeping []Sitting               []Standing [x]Transfers        []Walking []Kneeling               []Stairs [x]Squatting / bending   [x]ADLs []Reaching  []Lifting  [x]Housework  []Driving [x]Job related tasks  []Sports/Recreation []Other:      ASSESSMENT:  11/13 Treatment session completed this date. She had noticeable pressure sensations area eye area and behind her eyes   She has also mentioned that therapy has helped with balance some overall. However , she continues to have dizziness in the mornings with some spinning at times. Her dizziness is also provoked rapid head movement . Patient would like to follow up with Dr. Jake Fontaine ENT to discuss further management and testing as tolerated.  Patient will be discharged with HEP until further notice per Dr Nuvia Stephens. Treatment/Activity Tolerance:  [] Patient tolerated treatment well [] Patient limited by fatigue  [] Patient limited by pain  [x] Patient limited by other medical complications  [] Other:     Prognosis: [] Good [x] Fair  [] Poor    Patient Requires Follow-up: [x] Yes  [] No    PLAN: POC: PT 2x / week for 6 weeks. Assess response to HEP. habituation (patient very sensitive), balance; cervical manual, cervical AROM, eventually tuck and lifts;  [x] Continue per plan of care [] Alter current plan (see comments)  [] Plan of care initiated [] Hold pending MD visit [] Discharge    Electronically signed by: Daniel Laboy PT, DPT, OMT-C    Note: If patient does not return for scheduled/ recommended follow up visits, this note will serve as a discharge from care along with most recent update on progress.

## 2020-11-17 ENCOUNTER — HOSPITAL ENCOUNTER (OUTPATIENT)
Age: 65
Discharge: HOME | End: 2020-11-17
Payer: MEDICARE

## 2020-11-17 ENCOUNTER — OFFICE VISIT (OUTPATIENT)
Dept: ENT CLINIC | Age: 65
End: 2020-11-17
Payer: MEDICARE

## 2020-11-17 VITALS
HEART RATE: 61 BPM | DIASTOLIC BLOOD PRESSURE: 75 MMHG | TEMPERATURE: 97.7 F | BODY MASS INDEX: 34.9 KG/M2 | WEIGHT: 197 LBS | SYSTOLIC BLOOD PRESSURE: 145 MMHG

## 2020-11-17 LAB
CREAT SERPL-MCNC: 0.7 MG/DL (ref 0.6–1.2)
GFR AFRICAN AMERICAN: >60
GFR NON-AFRICAN AMERICAN: >60

## 2020-11-17 PROCEDURE — G8417 CALC BMI ABV UP PARAM F/U: HCPCS | Performed by: OTOLARYNGOLOGY

## 2020-11-17 PROCEDURE — 1123F ACP DISCUSS/DSCN MKR DOCD: CPT | Performed by: OTOLARYNGOLOGY

## 2020-11-17 PROCEDURE — 1036F TOBACCO NON-USER: CPT | Performed by: OTOLARYNGOLOGY

## 2020-11-17 PROCEDURE — 36415 COLL VENOUS BLD VENIPUNCTURE: CPT

## 2020-11-17 PROCEDURE — G8399 PT W/DXA RESULTS DOCUMENT: HCPCS | Performed by: OTOLARYNGOLOGY

## 2020-11-17 PROCEDURE — G8484 FLU IMMUNIZE NO ADMIN: HCPCS | Performed by: OTOLARYNGOLOGY

## 2020-11-17 PROCEDURE — 4040F PNEUMOC VAC/ADMIN/RCVD: CPT | Performed by: OTOLARYNGOLOGY

## 2020-11-17 PROCEDURE — G8427 DOCREV CUR MEDS BY ELIG CLIN: HCPCS | Performed by: OTOLARYNGOLOGY

## 2020-11-17 PROCEDURE — 1090F PRES/ABSN URINE INCON ASSESS: CPT | Performed by: OTOLARYNGOLOGY

## 2020-11-17 PROCEDURE — 3017F COLORECTAL CA SCREEN DOC REV: CPT | Performed by: OTOLARYNGOLOGY

## 2020-11-17 PROCEDURE — 82565 ASSAY OF CREATININE: CPT

## 2020-11-17 PROCEDURE — 99214 OFFICE O/P EST MOD 30 MIN: CPT | Performed by: OTOLARYNGOLOGY

## 2020-11-17 NOTE — PROGRESS NOTES
Patient related that 2 months ago she awakened with severe vertigo associated with nausea. It recurred the next morning as well with the same result and both times lasted 10 to 15 minutes at a time. Since that time she has noted intermittent symptoms similar but not as severe. She denies any tinnitus or pressure in either ear and has noted no loss of hearing. She did have a history of a perforation repaired in her right tympanic membrane several years ago associated with some decrease in hearing. This occurred approximately 20 to 30 years ago. She has had occasional tinnitus in her right ear as well. There has been no positive history of hearing problems in grandparents. Patient denies any fever and there has been no history of noise exposure or trauma. Balance therapy has not been totally successful. Currently, she appears in no obvious acute distress. Ear examination reveals some scarring of the right tympanic membrane but no evidence of perforation or infection. The left tympanic membrane is entirely normal as is the ear canal. Oral examination is unremarkable. The neck is free of adenopathy, mass, thyroid enlargement. The nasal mucosa appears normal as well. There is some mild right beating first-degree nystagmus noted. Romberg is unsteady. No actual laterality is identifiable. Cerebellar testing is negative. Gait appears normal. No focal neurologic signs are apparent. Pupils are equal round regular and reactive to light and accommodation. Audiogram showed some sensorineural loss on the right side compared to the left. I do believe that an electronystagmogram would be helpful and this will be ordered.

## 2020-11-30 ENCOUNTER — HOSPITAL ENCOUNTER (OUTPATIENT)
Dept: MRI IMAGING | Age: 65
Discharge: HOME | End: 2020-11-30
Payer: MEDICARE

## 2020-11-30 PROCEDURE — 6360000004 HC RX CONTRAST MEDICATION: Performed by: OTOLARYNGOLOGY

## 2020-11-30 PROCEDURE — 2580000003 HC RX 258: Performed by: OTOLARYNGOLOGY

## 2020-11-30 PROCEDURE — 70553 MRI BRAIN STEM W/O & W/DYE: CPT

## 2020-11-30 PROCEDURE — A9577 INJ MULTIHANCE: HCPCS | Performed by: OTOLARYNGOLOGY

## 2020-11-30 RX ORDER — SODIUM CHLORIDE 0.9 % (FLUSH) 0.9 %
10 SYRINGE (ML) INJECTION ONCE
Status: COMPLETED | OUTPATIENT
Start: 2020-11-30 | End: 2020-11-30

## 2020-11-30 RX ADMIN — GADOBENATE DIMEGLUMINE 16 ML: 529 INJECTION, SOLUTION INTRAVENOUS at 14:27

## 2020-11-30 RX ADMIN — Medication 10 ML: at 14:28

## 2020-12-13 NOTE — PROGRESS NOTES
Patient notes previous history of dizziness that reported resolved on own ~ 3 years ago. She also notes recent balance therapy within past 2 months which reportedly improved imbalance, but not dizziness. Neck Pain/Stiffness (Orthopedic): No significant factors reported. Neurological Factors:  No significant factors reported. Audiologic/Otologic History:  Last audiogram: 9/15/2020, Results: Symmetric sensorineural hearing loss with excellent word recognition, bilaterally. Patient reportedly has history of right tympanic membrane perforation 20-30 years ago. She also notes intermittent tinnitus and aural fullness in right ear that occasionally occurs with dizziness. Vision History:  Glasses: bifocal. Contacts: none. Vision problems: none    Headache/Migraine History:  negative for headaches     Fall Risk History: Patient notes history of imbalance, but denies significant history of falls or head injuries. Family History: No significant factors reported. Imaging: Patient reports recent MRI was unremarkable. Medication, Drugs, Alcohol:  Patient reports use of the following in the past 48 hours: none  Patient reported adhering to pre-test instructions: Yes    Date: 12/14/2020     TESTS COMPLETED AND RESULTS:      QUESTIONNAIRES:  Dizziness Handicap Inventory: Patient's score = 48       0-14 = No handicap     16-34 = Mild handicap     36-52 = Moderate handicap     54+ = Severe handicap    SCREENERS:   - VERTEBRAL ARTERY (VAST): Normal   - CERVICAL VERTIGO (CVST): Normal    GAZE:   - DOWN: With Fixation: Normal, Without Fixation: Normal   - UP: With Fixation: Normal, Without Fixation: Normal   - LEFT: With Fixation: Normal, Without Fixation: Normal   - RIGHT: With Fixation: Normal, Without Fixation: Normal    SACCADES: Abnormal velocity, latency, and accuracy.      SMOOTH PURSUIT: Normal    OPTOKINETICS: Abnormal    SPONTANEOUS NYSTAGMUS: Absent     HORIZONTAL HEAD SHAKE TEST: Normal ROSARIO-HALLPIKE: **NOTE: Patient noted subjective dizziness with left side worse compared to right.    - HEAD LEFT: Normal   - HEAD RIGHT: Normal    HEAD POSITIONALS, SUPINE POSITION 30 DEGREES:   - HEAD RIGHT: Normal   - HEAD CENTER/ PRE-CALORIC: Normal   - HEAD LEFT: Normal    BITHERMAL CALORIC IRRIGATIONS: Bithermal caloric irrigations did not reveal a significant caloric weakness or directional preponderance. UW: LEFT 4%               DP: RIGHT 1%                Hypofunction:  NO    STIMULATION Cool Right Cool Left Warm Right Warm Left   PEAK  17 16 26 24       PATIENT EDUCATION:      ? Reviewed purpose of testing completed today, general vestibular system function, and results obtained today. Educational information was shared verbally with patient. RECOMMENDATIONS:       - Continue medical follow-up with Mae Powers MD.   - Retest hearing as medically indicated and/or sooner if a change in hearing is noted.   -  Continue vestibular and balance therapy as needed per recommended by Mae Powers MD.      Gigi Mann  Audiologist      Chart CC'd to: Mae Powers MD

## 2020-12-14 ENCOUNTER — PROCEDURE VISIT (OUTPATIENT)
Dept: AUDIOLOGY | Age: 65
End: 2020-12-14
Payer: MEDICARE

## 2020-12-14 VITALS — TEMPERATURE: 97.9 F

## 2020-12-14 PROCEDURE — 92537 CALORIC VSTBLR TEST W/REC: CPT | Performed by: AUDIOLOGIST

## 2020-12-14 PROCEDURE — 92540 BASIC VESTIBULAR EVALUATION: CPT | Performed by: AUDIOLOGIST

## 2020-12-14 NOTE — Clinical Note
Dr. Mejia Barth,    Thank you for your referral for balance testing on this patient. Today's results revealed abnormal VNG. Cannot rule out central pathology as abnormal saccade and optokinetic testing may suggest central finding. Caloric irrigations and all other sub test results within normal limits. Please see the scanned VNG (under \"Media\" tab) and encounter note for details. If you have any questions, or if there is anything else you need, please let me know.       Gigi James  Audiologist  ---  [facility] - Audiology

## 2020-12-17 ENCOUNTER — TELEPHONE (OUTPATIENT)
Dept: OTOLARYNGOLOGY | Age: 65
End: 2020-12-17

## 2020-12-17 NOTE — TELEPHONE ENCOUNTER
Discussed central vertigo with negative MRI and ENG indicative of central etiology. Will arrange PT for dizziness and balance.

## 2020-12-17 NOTE — TELEPHONE ENCOUNTER
Called patient to make sure everything was in line for PT. Patient confirmed PT is scheduled. Patient did have a question as to what could cause her vertigo or dizziness? She said you mentioned something on the phone about the brain and the ears.  Please advise

## 2020-12-29 ENCOUNTER — HOSPITAL ENCOUNTER (OUTPATIENT)
Dept: PHYSICAL THERAPY | Age: 65
Setting detail: THERAPIES SERIES
Discharge: HOME | End: 2020-12-29
Payer: MEDICARE

## 2020-12-29 PROCEDURE — 97161 PT EVAL LOW COMPLEX 20 MIN: CPT

## 2020-12-29 PROCEDURE — 97112 NEUROMUSCULAR REEDUCATION: CPT

## 2020-12-29 NOTE — FLOWSHEET NOTE
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  Date Range    Mn []  Yes  [x]  No Na      Latex Allergy:  [x]NO      []YES  Preferred Language for Healthcare:   [x]English       []other:    Functional Scale:        Date assessed:  DHI: raw score = 42; dysfunction = 42%  20    Pain level:  0/10     SUBJECTIVE:  See eval    OBJECTIVE: See eval      RESTRICTIONS/PRECAUTIONS:     Exercises/Interventions:     Therapeutic Exercises (42510) Resistance / level Sets/sec Reps Notes                                                                  Therapeutic Activities (04876)                                          Neuromuscular Re-ed (43550)       VOR       Habituation in lying rolling-supine to sit                                   Manual Intervention [id number])       [employer] (-) however dizziness toward the left side                                               Pt. Education:  -patient educated on diagnosis, prognosis and expectations for rehab  -all patient questions were answered    Home Exercise Program:  Access Code: [email or web address] URL: [email or web address]   Date: 12/29/2020   Prepared by: Maribeth Hamman     Exercises   Supine to Left Side Lying Vestibular Habituation - 5-10 reps - 1 sets - 5 hold - 1x daily - 7x weekly   Supine to Long Sitting Vestibular Habituation - 5-10 reps - 1 sets - 1x daily - 7x weekly         Therapeutic Exercise and NMR EXR  [] (18756) Provided verbal/tactile cueing for activities related to strengthening, flexibility, endurance, ROM for improvements in  [] LE / Lumbar: LE, proximal hip, and core control with self care, mobility, lifting, ambulation. [] UE / Cervical: cervical, postural, scapular, scapulothoracic and UE control with self care, reaching, carrying, lifting, house/yardwork, driving, computer work.  [] (33578) Provided verbal/tactile cueing for activities related to improving balance, coordination, kinesthetic sense, posture, motor skill, proprioception to assist with   [] LE / lumbar: LE, proximal hip, and core control in self care, mobility, lifting, ambulation and eccentric single leg control. [] UE / cervical: cervical, scapular, scapulothoracic and UE control with self care, reaching, carrying, lifting, house/yardwork, driving, computer work.   [] (75309) Therapist is in constant attendance of 2 or more patients providing skilled therapy interventions, but not providing any significant amount of measurable one-on-one time to either patient, for improvements in  [] LE / lumbar: LE, proximal hip, and core control in self care, mobility, lifting, ambulation and eccentric single leg control. [] UE / cervical: cervical, scapular, scapulothoracic and UE control with self care, reaching, carrying, lifting, house/yardwork, driving, computer work.      NMR and Therapeutic Activities: [x] (67763 or 35419) Provided verbal/tactile cueing for activities related to improving balance, coordination, kinesthetic sense, posture, motor skill, proprioception and motor activation to allow for proper function of   [] LE: / Lumbar core, proximal hip and LE with self care and ADLs  [] UE / Cervical: cervical, postural, scapular, scapulothoracic and UE control with self care, carrying, lifting, driving, computer work.   [] (83697) Gait Re-education- Provided training and instruction to the patient for proper LE, core and proximal hip recruitment and positioning and eccentric body weight control with ambulation re-education including up and down stairs     Home Management Training / Self Care:  [] (41171) Provided self-care/home management training related to activities of daily living and compensatory training, and/or use of adaptive equipment for improvement with: ADLs and compensatory training, meal preparation, safety procedures and instruction in use of adaptive equipment, including bathing, grooming, dressing, personal hygiene, basic household cleaning and chores.      Home Exercise Program:    [x] (06619) Reviewed/Progressed HEP activities related to strengthening, flexibility, endurance, ROM of   [] LE / Lumbar: core, proximal hip and LE for functional self-care, mobility, lifting and ambulation/stair navigation   [] UE / Cervical: cervical, postural, scapular, scapulothoracic and UE control with self care, reaching, carrying, lifting, house/yardwork, driving, computer work  [] (30764)Reviewed/Progressed HEP activities related to improving balance, coordination, kinesthetic sense, posture, motor skill, proprioception of   [] LE: core, proximal hip and LE for self care, mobility, lifting, and ambulation/stair navigation    [] UE / Cervical: cervical, postural,  scapular, scapulothoracic and UE control with self care, reaching, carrying, lifting, house/yardwork, driving, computer work Manual Treatments:  PROM / STM / Oscillations-Mobs:  G-I, II, III, IV (PA's, Inf., Post.)  [] (66514) Provided manual therapy to mobilize LE, proximal hip and/or LS spine soft tissue/joints for the purpose of modulating pain, promoting relaxation,  increasing ROM, reducing/eliminating soft tissue swelling/inflammation/restriction, improving soft tissue extensibility and allowing for proper ROM for normal function with   [] LE / lumbar: self care, mobility, lifting and ambulation. [] UE / Cervical: self care, reaching, carrying, lifting, house/yardwork, driving, computer work. Modalities:  [] (31720) Vasopneumatic compression: Utilized vasopneumatic compression to decrease edema / swelling for the purpose of improving mobility and quad tone / recruitment which will allow for increased overall function including but not limited to self-care, transfers, ambulation, and ascending / descending stairs. Modalities:      Charges:  Timed Code Treatment Minutes: 25   Total Treatment Minutes: 36     [x] EVAL - LOW (67645)   [] EVAL - MOD (40560)  [] EVAL - HIGH (31186)  [] RE-EVAL (59881)  [] FQ(56118) x       [] Ionto  [x] NMR (57445) x       [] Vaso  [] Manual (08725) x       [] Ultrasound  [] TA x        [] Mech Traction (12331)  [] Aquatic Therapy x     [] ES (un) (28689):   [] Home Management Training x  [] ES(attended) (64107)   [] Dry Needling 1-2 muscles (96756):  [] Dry Needling 3+ muscles (710257)  [] Group:      [] Other:     GOALS:   Patient stated goal: to improve dizziness with bending over   []? Progressing: []? Met: []? Not Met: []? Adjusted     Therapist goals for Patient:   Short Term Goals: To be achieved in: 2 weeks  1. Independent in HEP and progression per patient tolerance, in order to prevent re-injury. []? Progressing: []? Met: []? Not Met: []?  Adjusted 2. Patient will have a decrease in dizziness/imbalance/symptoms  to facilitate improvement in movement, function, balance and ADLs as indicated by Functional Deficits. []? Progressing: []? Met: []? Not Met: []? Adjusted     Long Term Goals: To be achieved in: 6 weeks  1. Disability index score of 25% or less for the [facility] to assist with reaching prior level of function. []? Progressing: []? Met: []? Not Met: []? Adjusted  2. Patient will demonstrate increased cervical AROM to WNL, joint mobility WNL to allow for proper joint functioning as indicated by patients Functional Deficits. []? Progressing: []? Met: []? Not Met: []? Adjusted  3. Patient will demonstrate negative oculomotor special testing/positional testing as indicated by patients Functional Deficits. []? Progressing: []? Met: []? Not Met: []? Adjusted  4. Patient will return to functional activities including bending without increased symptoms or restriction. []? Progressing: []? Met: []? Not Met: []? Adjusted  5. Patient tolerate rolling to the left in bed without increase symptoms     []? Progressing: []? Met: []? Not Met: []? Adjusted       Overall Progression Towards Functional goals/ Treatment Progress Update:  [] Patient is progressing as expected towards functional goals listed. [] Progression is slowed due to complexities/Impairments listed. [] Progression has been slowed due to co-morbidities.   [x] Plan just implemented, too soon to assess goals progression <30days   [] Goals require adjustment due to lack of progress  [] Patient is not progressing as expected and requires additional follow up with physician  [] Other    Persisting Functional Limitations/Impairments:  []Sleeping []Sitting               []Standing []Transfers        []Walking []Kneeling               []Stairs []Squatting / bending   []ADLs []Reaching  []Lifting  []Housework  []Driving []Job related tasks []Sports/Recreation [x]Other: bending, rolling over to the left in bed        ASSESSMENT:  See eval  Treatment/Activity Tolerance:  [] Patient able to complete tx [] Patient limited by fatigue  [] Patient limited by pain  [] Patient limited by other medical complications  [] Other:     Prognosis: [] Good [] Fair  [] Poor    Patient Requires Follow-up: [x] Yes  [] No    Plan for next treatment session: habituation exercises as tolerated     PLAN: See eval. PT 2x / week for 6 weeks. [] Continue per plan of care [] Alter current plan (see comments)  [x] Plan of care initiated [] Hold pending MD visit [] Discharge    Electronically signed by: Devyn Demarco PT, DPT    Note: If patient does not return for scheduled/ recommended follow up visits, this note will serve as a discharge from care along with most recent update on progress.

## 2020-12-29 NOTE — PLAN OF CARE
[facility]  Outpatient Physical Therapy, [facility], [address]  Phone: [phone]   Fax: [phone]    Dear Referring Practitioner: Chio Knox MD,    We had the pleasure of evaluating the following patient for physical therapy services at [address] [place] [address]. A summary of our findings can be found in the initial assessment below. This includes our plan of care. If you have any questions or concerns regarding these findings, please do not hesitate to contact me at the office phone number checked above. Thank you for the referral.       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   [] Yes, Patient intake triggered C-SSRS Screening      [] C-SSRS Screening completed  [] PCP notified via Epic    SUBJECTIVE: Patient stated chief complaint: The patient returns to PT secondary to dizziness and vertigo symptoms .  States that she continues to have spinning/dizziness in the mornings while getting up, bending, and pressure sensation      Relevant Medical History:  Vertigo symptoms, anemia     Functional Outcome Measures: (at least 1 at evaluation)  Dizziness Handicap Index ([address])   42 DGI          Ceja Balance Scale        FGA          The Activities-Specific Balance Confidence Scale      Pain Scale: 0/10     Type: []Constant   [x]Intermittent  []Radiating []Localized []other:    Dizziness Scale: 5-8/10    Description of symptoms: [x] Vertigo [x]  Off-balance [] Lightheadedness    Symptoms are getting:  [] Better [] Worse  [x] Same      [] Episodic    Description of Spells: [] Constant [x] Spontaneous [x] Motion Induced [x] Induced by position changes [] Other:    Length of time spells occur: [] Seconds [x] Minutes  [] Hours [] Days [] Other:     Date of onset: 7/2020    Setting in which symptoms first occurred: home environment     What increases/provokes symptoms? Rapid head movement     What decreases/eases symptoms?  Resting neutral position     Hearing impairments:  [] Yes  [x] No  [] Other:     Hearing changes since onset:  [] Yes  [x] No  [] Other:    Visual changes since onset: [] Yes  [x] No  [] Other:    Recent falls:    [] Yes  [x] No     Comments:      History of migraines/HA:  [] Yes  [x] No    Comments:    Previous treatments:  PT in Sept -Nov    Job requirements/work status: Retired     Occupation/School: Retired     Living Status/Prior Level of Function: Prior to this injury / incident, patient was independent with ADLs and IADLs, Lives alone in a one story house     OBJECTIVE:     Palpation: tenderness at base of suboccipital     Functional Mobility/Transfers: independent     Posture: forward head and rounded shoulders    Inspection: Limited cervical ROM     Numbness/Tingling: NA    Gait: (include devices/WB status) Normal     Cervical AROM    Cervical Flexion WFL    Cervical Extension WFL    Cervical SB Limited by 25% R limited by 25%    Cervical rotation L Limited by 25% R WFL    Upper Extremity PROM AROM         L R L R   Shoulder Flexion  [facility]   Shoulder Abduction        Shoulder External Rotation  [facility]   Shoulder Internal Rotation  [facility] Nystagmus:  [] Yes  [x] No  [] Duration:       [] Direction:    Vertigo:  [] Yes  [x] No  [] Duration:     L [place] maneuver:   Nystagmus:  [] Yes  [x] No  [] Duration:  Dizziness and head pressure      [] Direction:    Vertigo:  [] Yes  [x] No  [] Duration:     Supine roll head right: NT   Nystagmus:  [] Yes  [] No  [] Duration:       [] Direction:    Vertigo:  [] Yes  [] No  [] Duration:     Supine roll head left: NT   Nystagmus:  [] Yes  [] No  [] Duration:       [] Direction:    Vertigo:  [] Yes  [] No  [] Duration:       [x] Patient history, allergies, meds reviewed. Medical chart reviewed. See intake form. Review of Systems (ROS):  [x]Performed Review of systems (Integumentary, Cardiopulmonary, Neurological) by intake and observation. Intake form has been scanned into medical record. Patient has been instructed to contact their primary care physician regarding ROS issues if not already being addressed at this time.       Co-morbidities/Complexities (which will affect course of rehabilitation):   []None           Arthritic conditions   []Rheumatoid arthritis (M05.9)  []Osteoarthritis (M19.91)   Cardiovascular conditions   []Hypertension (I10)  []Hyperlipidemia (E78.5)  []Angina pectoris (I20)  []Atherosclerosis (I70)   Musculoskeletal conditions   []Disc pathology   []Congenital spine pathologies   []Prior surgical intervention  []Osteoporosis (M81.8)  []Osteopenia (M85.8)   Endocrine conditions   []Hypothyroid (E03.9)  []Hyperthyroid Gastrointestinal conditions   []Constipation (A60.89)   Metabolic conditions   []Morbid obesity (E66.01)  []Diabetes type 1(E10.65) or 2 (E11.65)   []Neuropathy (G60.9)     Pulmonary conditions   []Asthma (J45)  []Coughing   []COPD (J44.9)   Psychological Disorders  []Anxiety (F41.9)  []Depression (F32.9)   []Other:   [x]Other:   Pernicious anemia         Barriers to/and or personal factors that will affect rehab potential:              []Age  []Sex    []Smoker []Motivation/Lack of Motivation                        []Co-Morbidities              []Cognitive Function, education/learning barriers              []Environmental, home barriers              []profession/work barriers  []past PT/medical experience  []other:  Justification:      Falls Risk Assessment (30 days):   [x] Falls Risk assessed; no intervention required. [] Falls Risk assessed; Patient requires intervention due to being higher risk   TUG score (>12s at risk):     [] Falls education provided, including       ASSESSMENT: The patient is a 78 yo female who suffers with central vestibular symptoms that presents with spinning, dizziness, and head pressure.    Functional Impairments:  Problem List/Functional Limitations:   [] BPPV    [] Right [] Posterior Canal [] Canalithiasis    [] Left  [] Horizontal Canal [] Cupulolithiasis   [] Decreased Gaze Stabilization    [x] Increased Motion Sensitivity   [x] Unilateral Vestibular Hypofunction [] Bilateral Vestibular Hypofunction   [] Gait Instability    [] Decreased tolerance for ADLs    [] Decreased functional strength   [] Reduced Balance/Proprioceptive control   [] Reduced ability to hear/focus   [] Noted cervical/thoracic/GHJ joint hypomobility   [] Noted cervical/thoracic/GHJ joint hypermobility   [] Decreased cervical/UE functional ROM   [] Noted Headache pain aggravated by neck movements with/without dizziness   [] Abnormal reflexes/sensation/myotomal/dermatomal deficits   [] Decreased DCF control or ability to hold head up   [] Decreased RC/scapular/core strength and neuromuscular control     Functional Activity Limitations (from functional questionnaire and intake)   []Reduced ability to tolerate prolonged functional positions   []Reduced ability or difficulty with changes of positions or transfers between positions  []Reduced ability to transfer in/out of bed or rolling in bed []Reduced ability or tolerance with driving, reading and/or computer work   [x]Reduced ability to perform lifting, reaching, carrying tasks   [x]Reduced ability to forward bend   []Reduced ability to ambulate prolonged functional periods/distances/surfaces   []Reduced ability to ascend/descend stairs  []Reduced ability to concentrate/focus  [x]Reduced ability to turn/pitch head rapidly  []Reduced ability to self-correct for losses of balance []other:       Participation Restrictions   [x]Reduced participation in self-care activities   []Reduced participation in home management activities   []Reduced participation in work activities   [x]Reduced participation in social activities   []Reduced participation in sport/recreational activities    Classification:   []Signs/symptoms consistent with BPPV (benign paroxysmal positional vertigo)      []Signs/symptoms consistent with unilateral peripheral vestibulopathy (i.e., vestibular neuritis, labyrinthitis, acoustic neuroma)   [x]Signs/symptoms consistent with central vestibulopathy  []Signs/symptoms consistent with migraine-related vestibulopathy  []Signs/symptoms consistent with Meniere's disease / post-traumatic hydrops  []Signs/symptoms consistent with perilymphatic fistula   []Signs/symptoms consistent with cervicogenic dizziness  []Signs/symptoms consistent with gait instability   [x]Signs/symptoms consistent with motion sensitivity    []signs/symptoms consistent with neck pain with mobility deficits     []signs/symptoms consistent with neck pain with movement coordinated impairments    []signs/symptoms consistent with neck pain with radiating pain    []signs/symptoms consistent with neck pain with headaches (cervicogenic)    []Signs/symptoms consistent with nerve root involvement including myotome & dermatome dysfunction   []sign/symptoms consistent with facet dysfunction of cervical and thoracic spine []signs/symptoms consistent suggesting central cord compression/UMN syndromes   []signs/symptoms consistent with discogenic cervical pain   []signs/symptoms consistent with rib dysfunction   []signs/symptoms consistent with postural dysfunction   []signs/symptoms consistent with shoulder pathology    []signs/symptoms consistent with post-surgical status including decreased ROM, strength and function. []signs/symptoms consistent with pathology which may benefit from Dry Needling  []signs/symptoms which may limit the use of advanced manual therapy techniques: (Elevated CV risk profile, recent trauma, intolerance to end range positions, prior TIA, visual issues, UE neurological compromise)   []other:      Prognosis/Rehab Potential:      []Excellent   [x]Good    []Fair   []Poor    Tolerance of evaluation/treatment:    [x]Excellent   []Good    []Fair   []Poor     Physical Therapy Evaluation Complexity Justification  [x] A history of present problem with:  [] no personal factors and/or comorbidities that impact the plan of care;  []1-2 personal factors and/or comorbidities that impact the plan of care  []3 personal factors and/or comorbidities that impact the plan of care  [x] An examination of body systems using standardized tests and measures addressing any of the following: body structures and functions (impairments), activity limitations, and/or participation restrictions;:  [x] a total of 1-2 or more elements   [] a total of 3 or more elements   [] a total of 4 or more elements   [x] A clinical presentation with:  [x] stable and/or uncomplicated characteristics   [] evolving clinical presentation with changing characteristics  [] unstable and unpredictable characteristics;   [x] Clinical decision making of [x] low, [] moderate, [] high complexity using standardized patient assessment instrument and/or measurable assessment of functional outcome.     [x] EVAL (LOW) 41185 (typically 15 minutes face-to-face) [] Progressing: [] Met: [] Not Met: [] Adjusted  2. Patient will demonstrate increased cervical AROM to WNL, joint mobility WNL to allow for proper joint functioning as indicated by patients Functional Deficits. [] Progressing: [] Met: [] Not Met: [] Adjusted  3. Patient will demonstrate negative oculomotor special testing/positional testing as indicated by patients Functional Deficits. [] Progressing: [] Met: [] Not Met: [] Adjusted  4. Patient will return to functional activities including bending without increased symptoms or restriction. [] Progressing: [] Met: [] Not Met: [] Adjusted  5.  Patient tolerate rolling to the left in bed without increase symptoms     [] Progressing: [] Met: [] Not Met: [] Adjusted     Electronically signed by:  Daniel Laboy PT

## 2020-12-31 ENCOUNTER — HOSPITAL ENCOUNTER (OUTPATIENT)
Dept: PHYSICAL THERAPY | Age: 65
Setting detail: THERAPIES SERIES
Discharge: HOME | End: 2020-12-31
Payer: MEDICARE

## 2020-12-31 PROCEDURE — 97112 NEUROMUSCULAR REEDUCATION: CPT

## 2020-12-31 NOTE — FLOWSHEET NOTE
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 Date Range    Mn []  Yes  [x]  No Na      Latex Allergy:  [x]NO      []YES  Preferred Language for Healthcare:   [x]English       []other:    Functional Scale:        Date assessed:  DHI: raw score = 42; dysfunction = 42%  20    Pain level:  0/10     SUBJECTIVE:  States that she's had some good days over the last two days.  Says she hasnt w    OBJECTIVE: See eval      RESTRICTIONS/PRECAUTIONS:     Exercises/Interventions:     Therapeutic Exercises (85609) Resistance / level Sets/sec Reps Notes                                                                  Therapeutic Activities (02793)                                          Neuromuscular Re-ed (02926)       VOR vert & Horiz  2ft & 6ft  1 min   2ft VOR pressure in the back of head   Habituation in lying rolling-supine to sit   4 x    VOR Cancellation  vert & Horiz   10           [facility]  (5)   2x    Turn Twist    10  LOB at the end   Quarter turns   2    [employer]  EO/EC    10    Manual Intervention (99235) [employer] (-) however dizziness toward the left side        R Epley  performed  5 min                                     Pt. Education:  -patient educated on diagnosis, prognosis and expectations for rehab  -all patient questions were answered    Home Exercise Program:  Access Code: [email or web address]   URL: [email or web address]   Date: 12/29/2020   Prepared by: Clement Barnett     Exercises   Supine to Left Side Lying Vestibular Habituation - 5-10 reps - 1 sets - 5 hold - 1x daily - 7x weekly   Supine to Long Sitting Vestibular Habituation - 5-10 reps - 1 sets - 1x daily - 7x weekly         Therapeutic Exercise and NMR EXR  [] (62911) Provided verbal/tactile cueing for activities related to strengthening, flexibility, endurance, ROM for improvements in  [] LE / Lumbar: LE, proximal hip, and core control with self care, mobility, lifting, ambulation. [] UE / Cervical: cervical, postural, scapular, scapulothoracic and UE control with self care, reaching, carrying, lifting, house/yardwork, driving, computer work.  [] (54548) Provided verbal/tactile cueing for activities related to improving balance, coordination, kinesthetic sense, posture, motor skill, proprioception to assist with   [] LE / lumbar: LE, proximal hip, and core control in self care, mobility, lifting, ambulation and eccentric single leg control. [] UE / cervical: cervical, scapular, scapulothoracic and UE control with self care, reaching, carrying, lifting, house/yardwork, driving, computer work.   [] (08801) Therapist is in constant attendance of 2 or more patients providing skilled therapy interventions, but not providing any significant amount of measurable one-on-one time to either patient, for improvements in  [] LE / lumbar: LE, proximal hip, and core control in self care, mobility, lifting, ambulation and eccentric single leg control. [] UE / cervical: cervical, scapular, scapulothoracic and UE control with self care, reaching, carrying, lifting, house/yardwork, driving, computer work. NMR and Therapeutic Activities:    [x] (20570 or 11039) Provided verbal/tactile cueing for activities related to improving balance, coordination, kinesthetic sense, posture, motor skill, proprioception and motor activation to allow for proper function of   [] LE: / Lumbar core, proximal hip and LE with self care and ADLs  [] UE / Cervical: cervical, postural, scapular, scapulothoracic and UE control with self care, carrying, lifting, driving, computer work.   [] (66634) Gait Re-education- Provided training and instruction to the patient for proper LE, core and proximal hip recruitment and positioning and eccentric body weight control with ambulation re-education including up and down stairs     Home Management Training / Self Care:  [] (80012) Provided self-care/home management training related to activities of daily living and compensatory training, and/or use of adaptive equipment for improvement with: ADLs and compensatory training, meal preparation, safety procedures and instruction in use of adaptive equipment, including bathing, grooming, dressing, personal hygiene, basic household cleaning and chores.      Home Exercise Program:    [x] (28556) Reviewed/Progressed HEP activities related to strengthening, flexibility, endurance, ROM of   [] LE / Lumbar: core, proximal hip and LE for functional self-care, mobility, lifting and ambulation/stair navigation   [] UE / Cervical: cervical, postural, scapular, scapulothoracic and UE control with self care, reaching, carrying, lifting, house/yardwork, driving, computer work  [] (76978)Reviewed/Progressed HEP activities related to improving balance, coordination, kinesthetic sense, posture, motor skill, proprioception of [] LE: core, proximal hip and LE for self care, mobility, lifting, and ambulation/stair navigation    [] UE / Cervical: cervical, postural,  scapular, scapulothoracic and UE control with self care, reaching, carrying, lifting, house/yardwork, driving, computer work    Manual Treatments:  PROM / STM / Oscillations-Mobs:  G-I, II, III, IV (PA's, Inf., Post.)  [] (78099) Provided manual therapy to mobilize LE, proximal hip and/or LS spine soft tissue/joints for the purpose of modulating pain, promoting relaxation,  increasing ROM, reducing/eliminating soft tissue swelling/inflammation/restriction, improving soft tissue extensibility and allowing for proper ROM for normal function with   [] LE / lumbar: self care, mobility, lifting and ambulation. [] UE / Cervical: self care, reaching, carrying, lifting, house/yardwork, driving, computer work. Modalities:  [] (81780) Vasopneumatic compression: Utilized vasopneumatic compression to decrease edema / swelling for the purpose of improving mobility and quad tone / recruitment which will allow for increased overall function including but not limited to self-care, transfers, ambulation, and ascending / descending stairs. Modalities:      Charges:  Timed Code Treatment Minutes: 43   Total Treatment Minutes: 43     [] EVAL - LOW (16670)   [] EVAL - MOD (40892)  [] EVAL - HIGH (23277)  [] RE-EVAL (84511)  [] OB(54721) x       [] Ionto  [x] NMR (54042) x 3      [] Vaso  [] Manual (93399) x       [] Ultrasound  [] TA x        [] Mech Traction (02529)  [] Aquatic Therapy x     [] ES (un) (99561):   [] Home Management Training x  [] ES(attended) (96763)   [] Dry Needling 1-2 muscles (55303):  [] Dry Needling 3+ muscles (469980)  [] Group:      [] Other:     GOALS:   Patient stated goal: to improve dizziness with bending over   []? Progressing: []? Met: []? Not Met: []? Adjusted     Therapist goals for Patient:   Short Term Goals:  To be achieved in: 2 weeks 1. Independent in HEP and progression per patient tolerance, in order to prevent re-injury. []? Progressing: []? Met: []? Not Met: []? Adjusted  2. Patient will have a decrease in dizziness/imbalance/symptoms  to facilitate improvement in movement, function, balance and ADLs as indicated by Functional Deficits. []? Progressing: []? Met: []? Not Met: []? Adjusted     Long Term Goals: To be achieved in: 6 weeks  1. Disability index score of 25% or less for the [facility] to assist with reaching prior level of function. []? Progressing: []? Met: []? Not Met: []? Adjusted  2. Patient will demonstrate increased cervical AROM to WNL, joint mobility WNL to allow for proper joint functioning as indicated by patients Functional Deficits. []? Progressing: []? Met: []? Not Met: []? Adjusted  3. Patient will demonstrate negative oculomotor special testing/positional testing as indicated by patients Functional Deficits. []? Progressing: []? Met: []? Not Met: []? Adjusted  4. Patient will return to functional activities including bending without increased symptoms or restriction. []? Progressing: []? Met: []? Not Met: []? Adjusted  5. Patient tolerate rolling to the left in bed without increase symptoms     []? Progressing: []? Met: []? Not Met: []? Adjusted       Overall Progression Towards Functional goals/ Treatment Progress Update:  [] Patient is progressing as expected towards functional goals listed. [] Progression is slowed due to complexities/Impairments listed. [] Progression has been slowed due to co-morbidities.   [x] Plan just implemented, too soon to assess goals progression <30days   [] Goals require adjustment due to lack of progress  [] Patient is not progressing as expected and requires additional follow up with physician  [] Other    Persisting Functional Limitations/Impairments:  []Sleeping []Sitting               []Standing []Transfers        []Walking []Kneeling []Stairs []Squatting / bending   []ADLs []Reaching  []Lifting  []Housework  []Driving []Job related tasks  []Sports/Recreation [x]Other: bending, rolling over to the left in bed        ASSESSMENT:  The patient able to begin habituation exercises with minimal LOB ,  pressure sensation in the back of head, and mild pressure in the left ear. The patient was able to tolerate supine lying and rolling left habituation activity. She will continue to further benefit from vestibular rehab to reduce dizziness . Treatment/Activity Tolerance:  [] Patient able to complete tx [] Patient limited by fatigue  [] Patient limited by pain  [] Patient limited by other medical complications  [] Other:     Prognosis: [] Good [] Fair  [] Poor    Patient Requires Follow-up: [x] Yes  [] No    Plan for next treatment session: habituation exercises as tolerated     PLAN: See rubina. PT 2x / week for 6 weeks. [] Continue per plan of care [] Alter current plan (see comments)  [x] Plan of care initiated [] Hold pending MD visit [] Discharge    Electronically signed by: Dave Avila PT, DPT    Note: If patient does not return for scheduled/ recommended follow up visits, this note will serve as a discharge from care along with most recent update on progress.

## 2021-01-05 ENCOUNTER — HOSPITAL ENCOUNTER (OUTPATIENT)
Dept: PHYSICAL THERAPY | Age: 66
Setting detail: THERAPIES SERIES
Discharge: HOME | End: 2021-01-05
Payer: MEDICARE

## 2021-01-05 PROCEDURE — 97140 MANUAL THERAPY 1/> REGIONS: CPT

## 2021-01-05 PROCEDURE — 97112 NEUROMUSCULAR REEDUCATION: CPT

## 2021-01-05 NOTE — FLOWSHEET NOTE
Turn Twist    10 12/31 LOB at the end   Quarter turns   2 Mild dizziness to Left    Sways A/P, M/L  EO/EC    10    Manual Intervention (22353)       [employer] (-) however dizziness toward the left side        R Epley  performed       SOR, STM, stretching in all planes   25 min                              Pt. Education:  -patient educated on diagnosis, prognosis and expectations for rehab  -all patient questions were answered    Home Exercise Program:  Access Code: [email or web address]   URL: [email or web address]   Date: 12/29/2020   Prepared by: Catrachita Rain     Exercises   Supine to Left Side Lying Vestibular Habituation - 5-10 reps - 1 sets - 5 hold - 1x daily - 7x weekly   Supine to Long Sitting Vestibular Habituation - 5-10 reps - 1 sets - 1x daily - 7x weekly         Therapeutic Exercise and NMR EXR  [] (79377) Provided verbal/tactile cueing for activities related to strengthening, flexibility, endurance, ROM for improvements in  [] LE / Lumbar: LE, proximal hip, and core control with self care, mobility, lifting, ambulation. [] UE / Cervical: cervical, postural, scapular, scapulothoracic and UE control with self care, reaching, carrying, lifting, house/yardwork, driving, computer work.  [] (98677) Provided verbal/tactile cueing for activities related to improving balance, coordination, kinesthetic sense, posture, motor skill, proprioception to assist with   [] LE / lumbar: LE, proximal hip, and core control in self care, mobility, lifting, ambulation and eccentric single leg control.    [] UE / cervical: cervical, scapular, scapulothoracic and UE control with self care, reaching, carrying, lifting, house/yardwork, driving, computer work.   [] (45547) Therapist is in constant attendance of 2 or more patients providing skilled therapy interventions, but not providing any significant amount of measurable one-on-one time to either patient, for improvements in [] LE / lumbar: LE, proximal hip, and core control in self care, mobility, lifting, ambulation and eccentric single leg control. [] UE / cervical: cervical, scapular, scapulothoracic and UE control with self care, reaching, carrying, lifting, house/yardwork, driving, computer work. NMR and Therapeutic Activities:    [x] (69953 or 59967) Provided verbal/tactile cueing for activities related to improving balance, coordination, kinesthetic sense, posture, motor skill, proprioception and motor activation to allow for proper function of   [] LE: / Lumbar core, proximal hip and LE with self care and ADLs  [] UE / Cervical: cervical, postural, scapular, scapulothoracic and UE control with self care, carrying, lifting, driving, computer work.   [] (38809) Gait Re-education- Provided training and instruction to the patient for proper LE, core and proximal hip recruitment and positioning and eccentric body weight control with ambulation re-education including up and down stairs     Home Management Training / Self Care:  [] (37613) Provided self-care/home management training related to activities of daily living and compensatory training, and/or use of adaptive equipment for improvement with: ADLs and compensatory training, meal preparation, safety procedures and instruction in use of adaptive equipment, including bathing, grooming, dressing, personal hygiene, basic household cleaning and chores.      Home Exercise Program:    [x] (91558) Reviewed/Progressed HEP activities related to strengthening, flexibility, endurance, ROM of   [] LE / Lumbar: core, proximal hip and LE for functional self-care, mobility, lifting and ambulation/stair navigation   [] UE / Cervical: cervical, postural, scapular, scapulothoracic and UE control with self care, reaching, carrying, lifting, house/yardwork, driving, computer work [] (77243)Reviewed/Progressed HEP activities related to improving balance, coordination, kinesthetic sense, posture, motor skill, proprioception of   [] LE: core, proximal hip and LE for self care, mobility, lifting, and ambulation/stair navigation    [] UE / Cervical: cervical, postural,  scapular, scapulothoracic and UE control with self care, reaching, carrying, lifting, house/yardwork, driving, computer work    Manual Treatments:  PROM / STM / Oscillations-Mobs:  G-I, II, III, IV (PA's, Inf., Post.)  [] (37134) Provided manual therapy to mobilize LE, proximal hip and/or LS spine soft tissue/joints for the purpose of modulating pain, promoting relaxation,  increasing ROM, reducing/eliminating soft tissue swelling/inflammation/restriction, improving soft tissue extensibility and allowing for proper ROM for normal function with   [] LE / lumbar: self care, mobility, lifting and ambulation. [] UE / Cervical: self care, reaching, carrying, lifting, house/yardwork, driving, computer work. Modalities:  [] (18859) Vasopneumatic compression: Utilized vasopneumatic compression to decrease edema / swelling for the purpose of improving mobility and quad tone / recruitment which will allow for increased overall function including but not limited to self-care, transfers, ambulation, and ascending / descending stairs.        Modalities:      Charges:  Timed Code Treatment Minutes: 58   Total Treatment Minutes: 58     [] EVAL - LOW (80235)   [] EVAL - MOD (81942)  [] EVAL - HIGH (39279)  [] RE-EVAL (19088)  [] FQ(38558) x       [] Ionto  [x] NMR (55379) x 2      [] Vaso  [x] Manual (89190) x 2      [] Ultrasound  [] TA x        [] Mech Traction (46278)  [] Aquatic Therapy x     [] ES (un) (63746):   [] Home Management Training x  [] ES(attended) (50214)   [] Dry Needling 1-2 muscles (64996):  [] Dry Needling 3+ muscles (887671)  [] Group:      [] Other:     GOALS: Note: If patient does not return for scheduled/ recommended follow up visits, this note will serve as a discharge from care along with most recent update on progress.

## 2021-01-07 ENCOUNTER — HOSPITAL ENCOUNTER (OUTPATIENT)
Dept: PHYSICAL THERAPY | Age: 66
Setting detail: THERAPIES SERIES
Discharge: HOME | End: 2021-01-07
Payer: MEDICARE

## 2021-01-07 PROCEDURE — 97112 NEUROMUSCULAR REEDUCATION: CPT

## 2021-01-07 PROCEDURE — 97140 MANUAL THERAPY 1/> REGIONS: CPT

## 2021-01-07 NOTE — FLOWSHEET NOTE
[] (39374) Therapist is in constant attendance of 2 or more patients providing skilled therapy interventions, but not providing any significant amount of measurable one-on-one time to either patient, for improvements in  [] LE / lumbar: LE, proximal hip, and core control in self care, mobility, lifting, ambulation and eccentric single leg control. [] UE / cervical: cervical, scapular, scapulothoracic and UE control with self care, reaching, carrying, lifting, house/yardwork, driving, computer work. NMR and Therapeutic Activities:    [x] (79144 or 03133) Provided verbal/tactile cueing for activities related to improving balance, coordination, kinesthetic sense, posture, motor skill, proprioception and motor activation to allow for proper function of   [] LE: / Lumbar core, proximal hip and LE with self care and ADLs  [] UE / Cervical: cervical, postural, scapular, scapulothoracic and UE control with self care, carrying, lifting, driving, computer work.   [] (93423) Gait Re-education- Provided training and instruction to the patient for proper LE, core and proximal hip recruitment and positioning and eccentric body weight control with ambulation re-education including up and down stairs     Home Management Training / Self Care:  [] (42830) Provided self-care/home management training related to activities of daily living and compensatory training, and/or use of adaptive equipment for improvement with: ADLs and compensatory training, meal preparation, safety procedures and instruction in use of adaptive equipment, including bathing, grooming, dressing, personal hygiene, basic household cleaning and chores.      Home Exercise Program:    [x] (28999) Reviewed/Progressed HEP activities related to strengthening, flexibility, endurance, ROM of   [] LE / Lumbar: core, proximal hip and LE for functional self-care, mobility, lifting and ambulation/stair navigation [] Dry Needling 1-2 muscles (89195):  [] Dry Needling 3+ muscles (748965)  [] Group:      [] Other:     GOALS:   Patient stated goal: to improve dizziness with bending over   []? Progressing: []? Met: []? Not Met: []? Adjusted     Therapist goals for Patient:   Short Term Goals: To be achieved in: 2 weeks  1. Independent in HEP and progression per patient tolerance, in order to prevent re-injury. []? Progressing: []? Met: []? Not Met: []? Adjusted  2. Patient will have a decrease in dizziness/imbalance/symptoms  to facilitate improvement in movement, function, balance and ADLs as indicated by Functional Deficits. []? Progressing: []? Met: []? Not Met: []? Adjusted     Long Term Goals: To be achieved in: 6 weeks  1. Disability index score of 25% or less for the [address] [place] [address] to assist with reaching prior level of function. []? Progressing: []? Met: []? Not Met: []? Adjusted  2. Patient will demonstrate increased cervical AROM to WNL, joint mobility WNL to allow for proper joint functioning as indicated by patients Functional Deficits. []? Progressing: []? Met: []? Not Met: []? Adjusted  3. Patient will demonstrate negative oculomotor special testing/positional testing as indicated by patients Functional Deficits. []? Progressing: []? Met: []? Not Met: []? Adjusted  4. Patient will return to functional activities including bending without increased symptoms or restriction. []? Progressing: []? Met: []? Not Met: []? Adjusted  5. Patient tolerate rolling to the left in bed without increase symptoms     []? Progressing: []? Met: []? Not Met: []? Adjusted       Overall Progression Towards Functional goals/ Treatment Progress Update:  [] Patient is progressing as expected towards functional goals listed. [] Progression is slowed due to complexities/Impairments listed. [] Progression has been slowed due to co-morbidities.   [x] Plan just implemented, too soon to assess goals progression <30days

## 2021-01-12 ENCOUNTER — HOSPITAL ENCOUNTER (OUTPATIENT)
Dept: PHYSICAL THERAPY | Age: 66
Setting detail: THERAPIES SERIES
Discharge: HOME | End: 2021-01-12
Payer: MEDICARE

## 2021-01-12 PROCEDURE — 97112 NEUROMUSCULAR REEDUCATION: CPT

## 2021-01-12 PROCEDURE — 97110 THERAPEUTIC EXERCISES: CPT

## 2021-01-12 PROCEDURE — 97140 MANUAL THERAPY 1/> REGIONS: CPT

## 2021-01-12 NOTE — FLOWSHEET NOTE
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 Date Range    Mn []  Yes  [x]  No Na      Latex Allergy:  [x]NO      []YES  Preferred Language for Healthcare:   [x]English       []other:    Functional Scale:          DHI: raw score = 42; dysfunction = 42%  Date assessed: 20    Pain level:  0/10     SUBJECTIVE:  The patient reports stiffness in neck is present this date. She denies pain. Dizziness on a scale of 1-10 is probably, 6-8 on average, most days of the week. She hints that the morning times are worst for dizziness as well as bending throughout the day. She finds if she slows down with aggravating factors/movements that she notices less severe symptoms. She finds she is more cautious when picking up her grandson from the floor.     OBJECTIVE:    - C1-2 PA pressure increases dizziness     RESTRICTIONS/PRECAUTIONS:     Exercises/Interventions:     Therapeutic Exercises (76114) Resistance / level Sets/sec Reps Notes   Treadmill warmup   3 min [x] UE / Cervical: cervical, postural, scapular, scapulothoracic and UE control with self care, reaching, carrying, lifting, house/yardwork, driving, computer work. [x] (15065) Provided verbal/tactile cueing for activities related to improving balance, coordination, kinesthetic sense, posture, motor skill, proprioception to assist with   [] LE / lumbar: LE, proximal hip, and core control in self care, mobility, lifting, ambulation and eccentric single leg control. [x] UE / cervical: cervical, scapular, scapulothoracic and UE control with self care, reaching, carrying, lifting, house/yardwork, driving, computer work.   [] (15149) Therapist is in constant attendance of 2 or more patients providing skilled therapy interventions, but not providing any significant amount of measurable one-on-one time to either patient, for improvements in  [] LE / lumbar: LE, proximal hip, and core control in self care, mobility, lifting, ambulation and eccentric single leg control. [] UE / cervical: cervical, scapular, scapulothoracic and UE control with self care, reaching, carrying, lifting, house/yardwork, driving, computer work.      NMR and Therapeutic Activities:    [x] (29075 or 45001) Provided verbal/tactile cueing for activities related to improving balance, coordination, kinesthetic sense, posture, motor skill, proprioception and motor activation to allow for proper function of   [] LE: / Lumbar core, proximal hip and LE with self care and ADLs  [x] UE / Cervical: cervical, postural, scapular, scapulothoracic and UE control with self care, carrying, lifting, driving, computer work.   [] (95760) Gait Re-education- Provided training and instruction to the patient for proper LE, core and proximal hip recruitment and positioning and eccentric body weight control with ambulation re-education including up and down stairs     Home Management Training / Self Care: [] (31716) Provided self-care/home management training related to activities of daily living and compensatory training, and/or use of adaptive equipment for improvement with: ADLs and compensatory training, meal preparation, safety procedures and instruction in use of adaptive equipment, including bathing, grooming, dressing, personal hygiene, basic household cleaning and chores. Home Exercise Program:    [] (31067) Reviewed/Progressed HEP activities related to strengthening, flexibility, endurance, ROM of   [] LE / Lumbar: core, proximal hip and LE for functional self-care, mobility, lifting and ambulation/stair navigation   [] UE / Cervical: cervical, postural, scapular, scapulothoracic and UE control with self care, reaching, carrying, lifting, house/yardwork, driving, computer work  [] (37674)Reviewed/Progressed HEP activities related to improving balance, coordination, kinesthetic sense, posture, motor skill, proprioception of   [] LE: core, proximal hip and LE for self care, mobility, lifting, and ambulation/stair navigation    [] UE / Cervical: cervical, postural,  scapular, scapulothoracic and UE control with self care, reaching, carrying, lifting, house/yardwork, driving, computer work    Manual Treatments:  PROM / STM / Oscillations-Mobs:  G-I, II, III, IV (PA's, Inf., Post.)  [x] (25302) Provided manual therapy to mobilize LE, proximal hip and/or LS spine soft tissue/joints for the purpose of modulating pain, promoting relaxation,  increasing ROM, reducing/eliminating soft tissue swelling/inflammation/restriction, improving soft tissue extensibility and allowing for proper ROM for normal function with   [] LE / lumbar: self care, mobility, lifting and ambulation. [x] UE / Cervical: self care, reaching, carrying, lifting, house/yardwork, driving, computer work.      Modalities: [] (50163) Vasopneumatic compression: Utilized vasopneumatic compression to decrease edema / swelling for the purpose of improving mobility and quad tone / recruitment which will allow for increased overall function including but not limited to self-care, transfers, ambulation, and ascending / descending stairs. Modalities:      Charges:  Timed Code Treatment Minutes: 46   Total Treatment Minutes: 46     [] EVAL - LOW (89833)   [] EVAL - MOD (14223)  [] EVAL - HIGH (75465)  [] RE-EVAL (03746)  [x] YV(20167) x 1       [] Ionto  [x] NMR (83394) x 1      [] Vaso  [x] Manual (40560) x 1      [] Ultrasound  [] TA x        [] Mech Traction (73839)  [] Aquatic Therapy x      [] ES (un) (68712):   [] Home Management Training x  [] ES(attended) (63832)   [] Dry Needling 1-2 muscles (40352):  [] Dry Needling 3+ muscles (936250)  [] Group:      [] Other:     GOALS:   Patient stated goal: to improve dizziness with bending over   []? Progressing: []? Met: []? Not Met: []? Adjusted     Therapist goals for Patient:   Short Term Goals: To be achieved in: 2 weeks  1. Independent in HEP and progression per patient tolerance, in order to prevent re-injury. []? Progressing: []? Met: []? Not Met: []? Adjusted  2. Patient will have a decrease in dizziness/imbalance/symptoms  to facilitate improvement in movement, function, balance and ADLs as indicated by Functional Deficits. []? Progressing: []? Met: []? Not Met: []? Adjusted     Long Term Goals: To be achieved in: 6 weeks  1. Disability index score of 25% or less for the [facility] to assist with reaching prior level of function. []? Progressing: []? Met: []? Not Met: []? Adjusted  2. Patient will demonstrate increased cervical AROM to WNL, joint mobility WNL to allow for proper joint functioning as indicated by patients Functional Deficits. []? Progressing: []? Met: []? Not Met: []?  Adjusted 3. Patient will demonstrate negative oculomotor special testing/positional testing as indicated by patients Functional Deficits. []? Progressing: []? Met: []? Not Met: []? Adjusted  4. Patient will return to functional activities including bending without increased symptoms or restriction. []? Progressing: []? Met: []? Not Met: []? Adjusted  5. Patient tolerate rolling to the left in bed without increase symptoms     []? Progressing: []? Met: []? Not Met: []? Adjusted         Overall Progression Towards Functional goals/ Treatment Progress Update:  [] Patient is progressing as expected towards functional goals listed. [] Progression is slowed due to complexities/Impairments listed. [] Progression has been slowed due to co-morbidities. [x] Plan just implemented, too soon to assess goals progression <30days   [] Goals require adjustment due to lack of progress  [] Patient is not progressing as expected and requires additional follow up with physician  [] Other    Persisting Functional Limitations/Impairments:  []Sleeping []Sitting               []Standing []Transfers        []Walking []Kneeling               []Stairs []Squatting / bending   []ADLs []Reaching  []Lifting  []Housework  []Driving []Job related tasks  []Sports/Recreation [x]Other: bending, rolling over to the left in bed        ASSESSMENT:  PT focused on getting improved capital on cervical mobility this date, which was limited by suboccipital tension as well as trigger points in neck and UTs. The patient was able to perform capital flexion with some tightness/discomfort, but tolerable. The patient was challenged by VOR cancellation and vertical movements of VOR. Continued manual to reduce tension, improve capital and cervical mobility (independently) warranted.           Treatment/Activity Tolerance:  [x] Patient able to complete tx  [] Patient limited by fatigue  [] Patient limited by pain  [] Patient limited by other medical complications [] Other:     Prognosis: [x] Good [] Fair  [] Poor    Patient Requires Follow-up: [x] Yes  [] No    Plan for next treatment session: habituation exercises as tolerated, manual for capital/cervial mobility and stretches    PLAN: See rubina. PT 2x / week for 6 weeks. [x] Continue per plan of care [] Alter current plan (see comments)  [] Plan of care initiated [] Hold pending MD visit [] Discharge    Electronically signed by: Ray Ibanez PT, DPT, OMT-C    Note: If patient does not return for scheduled/ recommended follow up visits, this note will serve as a discharge from care along with most recent update on progress.

## 2021-01-14 ENCOUNTER — HOSPITAL ENCOUNTER (OUTPATIENT)
Dept: PHYSICAL THERAPY | Age: 66
Setting detail: THERAPIES SERIES
Discharge: HOME | End: 2021-01-14
Payer: MEDICARE

## 2021-01-14 PROCEDURE — 97140 MANUAL THERAPY 1/> REGIONS: CPT

## 2021-01-14 PROCEDURE — 97112 NEUROMUSCULAR REEDUCATION: CPT

## 2021-01-14 NOTE — FLOWSHEET NOTE
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 Date Range    Mn []  Yes  [x]  No Na      Latex Allergy:  [x]NO      []YES  Preferred Language for Healthcare:   [x]English       []other:    Functional Scale:          DHI: raw score = 42; dysfunction = 42%  Date assessed: 20    Pain level:  0/10     SUBJECTIVE:  The patient reports that this morning she's having some mild dizziness due to the weather . The patient reports stiffness in neck is present this date. She denies pain. Dizziness on a scale of 1-10 is probably, 6-8 on average, most days of the week. She hints that the morning times are worst for dizziness as well as bending throughout the day. She finds if she slows down with aggravating factors/movements that she notices less severe symptoms. She finds she is more cautious when picking up her grandson from the floor.     OBJECTIVE:    - C1-2 PA pressure increases dizziness     RESTRICTIONS/PRECAUTIONS:     Exercises/Interventions:     Therapeutic Exercises (47339) Resistance / level Sets/sec Reps Notes Treadmill warmup        Cervical Tucks - supine  1 / 3'' in/out 10x 1/12 - added; follows manual C1-C2 distraction   Seated [employer] Sideglide R/L   5x R/L 1/12 - added; patient cued with TC at C2 TVP                                              Therapeutic Activities (19806)                                          Neuromuscular Re-ed (26756)       VOR vert & Horiz  2ft & 6ft  1 min, ea  1/14 pressure on the left side back of head ; seemed like something trying to connect on the R   Folding over    Habituation in lying rolling-supine to sit      Mild dizziness    VOR Cancellation  vert & Horiz - standing   10x ea 1/5 pressure in the head tracking to left and vertical   Airex:  Head nods/turns EO/EC     1/7 Added   [facility]  (5), one set placement bwd   Turn Twist     12/31 LOB at the end   Quarter turns   3, 360s R/L 1/12 - dizziness to Left    Sways A/P, M/L  EO/EC           Manual Intervention (44667)       [employer] (-) however dizziness toward the left side        R Epley  performed       SOR,   2 min     C1-C2 Distraction R/L G2-3 3 min ea  1/12 - added   C/S Traction G1-3 10'' holds, 3 min                Pt. Education:  -patient educated on diagnosis, prognosis and expectations for rehab  -all patient questions were answered    Home Exercise Program:  Access Code: [email or web address]   URL: [email or web address]   Date: 12/29/2020   Prepared by: Conception Snide     Exercises   Supine to Left Side Lying Vestibular Habituation - 5-10 reps - 1 sets - 5 hold - 1x daily - 7x weekly   Supine to Long Sitting Vestibular Habituation - 5-10 reps - 1 sets - 1x daily - 7x weekly         Therapeutic Exercise and NMR EXR  [x] (91292) Provided verbal/tactile cueing for activities related to strengthening, flexibility, endurance, ROM for improvements in  [] LE / Lumbar: LE, proximal hip, and core control with self care, mobility, lifting, ambulation. [x] UE / Cervical: cervical, postural, scapular, scapulothoracic and UE control with self care, reaching, carrying, lifting, house/yardwork, driving, computer work. [x] (94482) Provided verbal/tactile cueing for activities related to improving balance, coordination, kinesthetic sense, posture, motor skill, proprioception to assist with   [] LE / lumbar: LE, proximal hip, and core control in self care, mobility, lifting, ambulation and eccentric single leg control. [x] UE / cervical: cervical, scapular, scapulothoracic and UE control with self care, reaching, carrying, lifting, house/yardwork, driving, computer work.   [] (95039) Therapist is in constant attendance of 2 or more patients providing skilled therapy interventions, but not providing any significant amount of measurable one-on-one time to either patient, for improvements in  [] LE / lumbar: LE, proximal hip, and core control in self care, mobility, lifting, ambulation and eccentric single leg control. [] UE / cervical: cervical, scapular, scapulothoracic and UE control with self care, reaching, carrying, lifting, house/yardwork, driving, computer work.      NMR and Therapeutic Activities:    [x] (73854 or 56035) Provided verbal/tactile cueing for activities related to improving balance, coordination, kinesthetic sense, posture, motor skill, proprioception and motor activation to allow for proper function of   [] LE: / Lumbar core, proximal hip and LE with self care and ADLs  [x] UE / Cervical: cervical, postural, scapular, scapulothoracic and UE control with self care, carrying, lifting, driving, computer work.   [] (18364) Gait Re-education- Provided training and instruction to the patient for proper LE, core and proximal hip recruitment and positioning and eccentric body weight control with ambulation re-education including up and down stairs     Home Management Training / Self Care: [] (31659) Provided self-care/home management training related to activities of daily living and compensatory training, and/or use of adaptive equipment for improvement with: ADLs and compensatory training, meal preparation, safety procedures and instruction in use of adaptive equipment, including bathing, grooming, dressing, personal hygiene, basic household cleaning and chores. Home Exercise Program:    [] (15043) Reviewed/Progressed HEP activities related to strengthening, flexibility, endurance, ROM of   [] LE / Lumbar: core, proximal hip and LE for functional self-care, mobility, lifting and ambulation/stair navigation   [] UE / Cervical: cervical, postural, scapular, scapulothoracic and UE control with self care, reaching, carrying, lifting, house/yardwork, driving, computer work  [] (29438)Reviewed/Progressed HEP activities related to improving balance, coordination, kinesthetic sense, posture, motor skill, proprioception of   [] LE: core, proximal hip and LE for self care, mobility, lifting, and ambulation/stair navigation    [] UE / Cervical: cervical, postural,  scapular, scapulothoracic and UE control with self care, reaching, carrying, lifting, house/yardwork, driving, computer work    Manual Treatments:  PROM / STM / Oscillations-Mobs:  G-I, II, III, IV (PA's, Inf., Post.)  [x] (75152) Provided manual therapy to mobilize LE, proximal hip and/or LS spine soft tissue/joints for the purpose of modulating pain, promoting relaxation,  increasing ROM, reducing/eliminating soft tissue swelling/inflammation/restriction, improving soft tissue extensibility and allowing for proper ROM for normal function with   [] LE / lumbar: self care, mobility, lifting and ambulation. [x] UE / Cervical: self care, reaching, carrying, lifting, house/yardwork, driving, computer work.      Modalities: [] (12700) Vasopneumatic compression: Utilized vasopneumatic compression to decrease edema / swelling for the purpose of improving mobility and quad tone / recruitment which will allow for increased overall function including but not limited to self-care, transfers, ambulation, and ascending / descending stairs. Modalities:      Charges:  Timed Code Treatment Minutes: 38   Total Treatment Minutes: 38     [] EVAL - LOW (85134)   [] EVAL - MOD (38698)  [] EVAL - HIGH (50882)  [] RE-EVAL (71799)  [] MK(58606) x        [] Ionto  [x] NMR (80515) x 2      [] Vaso  [x] Manual (92974) x 1      [] Ultrasound  [] TA x        [] Mech Traction (55958)  [] Aquatic Therapy x      [] ES (un) (67015):   [] Home Management Training x  [] ES(attended) (66248)   [] Dry Needling 1-2 muscles (18602):  [] Dry Needling 3+ muscles (004977)  [] Group:      [] Other:     GOALS:   Patient stated goal: to improve dizziness with bending over   []? Progressing: []? Met: []? Not Met: []? Adjusted     Therapist goals for Patient:   Short Term Goals: To be achieved in: 2 weeks  1. Independent in HEP and progression per patient tolerance, in order to prevent re-injury. []? Progressing: []? Met: []? Not Met: []? Adjusted  2. Patient will have a decrease in dizziness/imbalance/symptoms  to facilitate improvement in movement, function, balance and ADLs as indicated by Functional Deficits. []? Progressing: []? Met: []? Not Met: []? Adjusted     Long Term Goals: To be achieved in: 6 weeks  1. Disability index score of 25% or less for the [facility] to assist with reaching prior level of function. []? Progressing: []? Met: []? Not Met: []? Adjusted  2. Patient will demonstrate increased cervical AROM to WNL, joint mobility WNL to allow for proper joint functioning as indicated by patients Functional Deficits. []? Progressing: []? Met: []? Not Met: []?  Adjusted

## 2021-01-19 ENCOUNTER — HOSPITAL ENCOUNTER (OUTPATIENT)
Dept: PHYSICAL THERAPY | Age: 66
Setting detail: THERAPIES SERIES
Discharge: HOME | End: 2021-01-19
Payer: MEDICARE

## 2021-01-19 PROCEDURE — 97112 NEUROMUSCULAR REEDUCATION: CPT

## 2021-01-19 PROCEDURE — 97140 MANUAL THERAPY 1/> REGIONS: CPT

## 2021-01-19 NOTE — FLOWSHEET NOTE
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 Date Range    [place] 7 visits  21 to 3/14/21 []  Yes  [x]  No Na      Latex Allergy:  [x]NO      []YES  Preferred Language for Healthcare:   [x]English       []other:    Functional Scale:          DHI: raw score = 42; dysfunction = 42%  Date assessed: 20    Pain level:  0/10     SUBJECTIVE:  The patient reports that she had small episodes of dizziness with bending over more so than rolling over in bed. The patient reports stiffness in neck is present this date. She denies pain. Dizziness on a scale of 1-10 is probably, 6-8 on average, most days of the week. She hints that the morning times are worst for dizziness as well as bending throughout the day. She finds if she slows down with aggravating factors/movements that she notices less severe symptoms. She finds she is more cautious when picking up her grandson from the floor.     OBJECTIVE:    - C1-2 PA pressure increases dizziness     RESTRICTIONS/PRECAUTIONS:     Exercises/Interventions: Therapeutic Exercises (02734) Resistance / level Sets/sec Reps Notes   Treadmill warmup        Cervical Tucks - supine  1 / 3'' in/out 10x 1/12 - added; follows manual C1-C2 distraction   Seated [employer] Sideglide R/L   5x R/L 1/12 - added; patient cued with TC at C2 TVP                                              Therapeutic Activities (03009)                                          Neuromuscular Re-ed (78853)       VOR vert & Horiz  2ft & 6ft  1 min, ea  1/14 pressure on the left side back of head ; seemed like something trying to connect on the R   Folding over    Habituation in lying rolling-supine to sit      Mild dizziness    VOR Cancellation  vert & Horiz - standing   10x ea 1/5 pressure in the head tracking to left and vertical   Airex:  Head nods/turns EO/EC     1/7 Added   [facility]  (5), one set placement bwd   Turn Twist     12/31 LOB at the end   Quarter turns   3, 360s R/L 1/12 - dizziness to Left    Sways A/P, M/L  EO/EC           Manual Intervention (31641)       [employer] (-) however dizziness toward the left side        R Epley  performed       SOR, MET   8 min     C1-C2 Distraction R/L G2-3 10 min ea  1/12 - added   C/S Traction G1-3 10'' holds, 5 min                Pt. Education:  -patient educated on diagnosis, prognosis and expectations for rehab  -all patient questions were answered    Home Exercise Program:  Access Code: [email or web address]   URL: [email or web address]   Date: 12/29/2020   Prepared by: Lionel Rivera     Exercises   Supine to Left Side Lying Vestibular Habituation - 5-10 reps - 1 sets - 5 hold - 1x daily - 7x weekly   Supine to Long Sitting Vestibular Habituation - 5-10 reps - 1 sets - 1x daily - 7x weekly         Therapeutic Exercise and NMR EXR  [x] (36493) Provided verbal/tactile cueing for activities related to strengthening, flexibility, endurance, ROM for improvements in [] LE / Lumbar: LE, proximal hip, and core control with self care, mobility, lifting, ambulation. [x] UE / Cervical: cervical, postural, scapular, scapulothoracic and UE control with self care, reaching, carrying, lifting, house/yardwork, driving, computer work. [x] (35092) Provided verbal/tactile cueing for activities related to improving balance, coordination, kinesthetic sense, posture, motor skill, proprioception to assist with   [] LE / lumbar: LE, proximal hip, and core control in self care, mobility, lifting, ambulation and eccentric single leg control. [x] UE / cervical: cervical, scapular, scapulothoracic and UE control with self care, reaching, carrying, lifting, house/yardwork, driving, computer work.   [] (57024) Therapist is in constant attendance of 2 or more patients providing skilled therapy interventions, but not providing any significant amount of measurable one-on-one time to either patient, for improvements in  [] LE / lumbar: LE, proximal hip, and core control in self care, mobility, lifting, ambulation and eccentric single leg control. [] UE / cervical: cervical, scapular, scapulothoracic and UE control with self care, reaching, carrying, lifting, house/yardwork, driving, computer work.      NMR and Therapeutic Activities:    [x] (81443 or 72471) Provided verbal/tactile cueing for activities related to improving balance, coordination, kinesthetic sense, posture, motor skill, proprioception and motor activation to allow for proper function of   [] LE: / Lumbar core, proximal hip and LE with self care and ADLs  [x] UE / Cervical: cervical, postural, scapular, scapulothoracic and UE control with self care, carrying, lifting, driving, computer work.   [] (85880) Gait Re-education- Provided training and instruction to the patient for proper LE, core and proximal hip recruitment and positioning and eccentric body weight control with ambulation re-education including up and down stairs Home Management Training / Self Care:  [] (80916) Provided self-care/home management training related to activities of daily living and compensatory training, and/or use of adaptive equipment for improvement with: ADLs and compensatory training, meal preparation, safety procedures and instruction in use of adaptive equipment, including bathing, grooming, dressing, personal hygiene, basic household cleaning and chores. Home Exercise Program:    [] (87299) Reviewed/Progressed HEP activities related to strengthening, flexibility, endurance, ROM of   [] LE / Lumbar: core, proximal hip and LE for functional self-care, mobility, lifting and ambulation/stair navigation   [] UE / Cervical: cervical, postural, scapular, scapulothoracic and UE control with self care, reaching, carrying, lifting, house/yardwork, driving, computer work  [] (70404)Reviewed/Progressed HEP activities related to improving balance, coordination, kinesthetic sense, posture, motor skill, proprioception of   [] LE: core, proximal hip and LE for self care, mobility, lifting, and ambulation/stair navigation    [] UE / Cervical: cervical, postural,  scapular, scapulothoracic and UE control with self care, reaching, carrying, lifting, house/yardwork, driving, computer work    Manual Treatments:  PROM / STM / Oscillations-Mobs:  G-I, II, III, IV (PA's, Inf., Post.)  [x] (92182) Provided manual therapy to mobilize LE, proximal hip and/or LS spine soft tissue/joints for the purpose of modulating pain, promoting relaxation,  increasing ROM, reducing/eliminating soft tissue swelling/inflammation/restriction, improving soft tissue extensibility and allowing for proper ROM for normal function with   [] LE / lumbar: self care, mobility, lifting and ambulation. [x] UE / Cervical: self care, reaching, carrying, lifting, house/yardwork, driving, computer work.      Modalities: [] (17821) Vasopneumatic compression: Utilized vasopneumatic compression to decrease edema / swelling for the purpose of improving mobility and quad tone / recruitment which will allow for increased overall function including but not limited to self-care, transfers, ambulation, and ascending / descending stairs. Modalities:      Charges:  Timed Code Treatment Minutes: 55   Total Treatment Minutes: 55     [] EVAL - LOW (13067)   [] EVAL - MOD (93674)  [] EVAL - HIGH (18847)  [] RE-EVAL (95583)  [] RU(12151) x        [] Ionto  [x] NMR (69286) x 2      [] Vaso  [x] Manual (81053) x 2      [] Ultrasound  [] TA x        [] Mech Traction (04106)  [] Aquatic Therapy x      [] ES (un) (08519):   [] Home Management Training x  [] ES(attended) (81844)   [] Dry Needling 1-2 muscles (79056):  [] Dry Needling 3+ muscles (723141)  [] Group:      [] Other:     GOALS:   Patient stated goal: to improve dizziness with bending over   []? Progressing: []? Met: []? Not Met: []? Adjusted     Therapist goals for Patient:   Short Term Goals: To be achieved in: 2 weeks  1. Independent in HEP and progression per patient tolerance, in order to prevent re-injury. []? Progressing: []? Met: []? Not Met: []? Adjusted  2. Patient will have a decrease in dizziness/imbalance/symptoms  to facilitate improvement in movement, function, balance and ADLs as indicated by Functional Deficits. []? Progressing: []? Met: []? Not Met: []? Adjusted     Long Term Goals: To be achieved in: 6 weeks  1. Disability index score of 25% or less for the [facility] to assist with reaching prior level of function. []? Progressing: []? Met: []? Not Met: []? Adjusted  2. Patient will demonstrate increased cervical AROM to WNL, joint mobility WNL to allow for proper joint functioning as indicated by patients Functional Deficits. []? Progressing: []? Met: []? Not Met: []?  Adjusted 3. Patient will demonstrate negative oculomotor special testing/positional testing as indicated by patients Functional Deficits. []? Progressing: []? Met: []? Not Met: []? Adjusted  4. Patient will return to functional activities including bending without increased symptoms or restriction. []? Progressing: []? Met: []? Not Met: []? Adjusted  5. Patient tolerate rolling to the left in bed without increase symptoms     []? Progressing: []? Met: []? Not Met: []? Adjusted         Overall Progression Towards Functional goals/ Treatment Progress Update:  [] Patient is progressing as expected towards functional goals listed. [] Progression is slowed due to complexities/Impairments listed. [] Progression has been slowed due to co-morbidities. [x] Plan just implemented, too soon to assess goals progression <30days   [] Goals require adjustment due to lack of progress  [] Patient is not progressing as expected and requires additional follow up with physician  [] Other    Persisting Functional Limitations/Impairments:  []Sleeping []Sitting               []Standing []Transfers        []Walking []Kneeling               []Stairs []Squatting / bending   []ADLs []Reaching  []Lifting  []Housework  []Driving []Job related tasks  []Sports/Recreation [x]Other: bending, rolling over to the left in bed        ASSESSMENT:   Added VOR on noncompliant surfaces ([employer]) EO/EC with mild dizziness. Patient is still challenged with cone pick both forward /bwd with gaze stabilization. Patient received manual STM,SOR,capital distraction to reduce muscle tension. Patient will continue to benefit from a combination of habituation activities and manual therapy. PT again focused capitlal distraction and manual therapy to reduce muscle tension in  Suboccipital muscles . Will continue a combination of habituation and manual therapy to reduce dizziness . PT focused on getting improved capital on cervical mobility this date, which was limited by suboccipital tension as well as trigger points in neck and UTs. The patient was able to perform capital flexion with some tightness/discomfort, but tolerable. The patient was challenged by VOR cancellation and vertical movements of VOR. Continued manual to reduce tension, improve capital and cervical mobility (independently) warranted. Treatment/Activity Tolerance:  [x] Patient able to complete tx  [] Patient limited by fatigue  [] Patient limited by pain  [] Patient limited by other medical complications  [] Other:     Prognosis: [x] Good [] Fair  [] Poor    Patient Requires Follow-up: [x] Yes  [] No    Plan for next treatment session: habituation exercises as tolerated, manual for capital/cervial mobility and stretches    PLAN: See eval. PT 2x / week for 6 weeks. [x] Continue per plan of care [] Alter current plan (see comments)  [] Plan of care initiated [] Hold pending MD visit [] Discharge    Electronically signed by: Ilda Nieves PT, DPT, OMT-C    Note: If patient does not return for scheduled/ recommended follow up visits, this note will serve as a discharge from care along with most recent update on progress.

## 2021-01-21 ENCOUNTER — HOSPITAL ENCOUNTER (OUTPATIENT)
Dept: PHYSICAL THERAPY | Age: 66
Setting detail: THERAPIES SERIES
Discharge: HOME | End: 2021-01-21
Payer: MEDICARE

## 2021-01-21 PROCEDURE — 97140 MANUAL THERAPY 1/> REGIONS: CPT

## 2021-01-21 PROCEDURE — 97112 NEUROMUSCULAR REEDUCATION: CPT

## 2021-01-21 NOTE — FLOWSHEET NOTE
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 Date Range     [place] Visit #2 [place] 7 visits  21 to 3/14/21 []  Yes  [x]  No Na      Latex Allergy:  [x]NO      []YES  Preferred Language for Healthcare:   [x]English       []other:    Functional Scale:          DHI: raw score = 42; dysfunction = 42%  Date assessed: 20    Pain level:  0/10     SUBJECTIVE:  The patient reports that she continues to had mild dizziness yesterday especially with bending over trying to  grandson along pressure at the suboccipital region.               OBJECTIVE:    - C1-2 PA pressure increases dizziness     RESTRICTIONS/PRECAUTIONS:     Exercises/Interventions:     Therapeutic Exercises (61577) Resistance / level Sets/sec Reps Notes   Treadmill warmup   5 min     Cervical Tucks - supine  1 / 3'' in/out 10x 1/12 - added; follows manual C1-C2 distraction   Seated [employer] Sideglide R/L   5x R/L  - added; patient cued with TC at C2 [facility]                                              Therapeutic Activities (94270) Neuromuscular Re-ed (40863)       VOR vert & Horiz  2ft & 6ft  1 min, ea  1/14 pressure on the left side back of head ; seemed like something trying to connect on the R   Folding over    Habituation in lying rolling-supine to sit     10x Mild dizziness    VOR Cancellation  vert & Horiz - standing   10x ea 1/5 pressure in the head tracking to left and vertical   Airex:  Head nods/turns EO/EC     1/7 Added   [facility]  (5), one set placement bwd   Turn Twist     12/31 LOB at the end   Quarter turns   3, 360s R/L 1/12 - dizziness to Left    Sways A/P, M/L  EO/EC           Manual Intervention (28534)       [employer] (-) however dizziness toward the left side        R Epley  performed       SOR, MET   3 min     C1-C2 Distraction R/L G2-3 10 min ea  1/12 - added   C/S Traction G1-3 10'' holds, 5 min                Pt. Education:  -patient educated on diagnosis, prognosis and expectations for rehab  -all patient questions were answered    Home Exercise Program:  Access Code: [email or web address]   URL: [email or web address]   Date: 12/29/2020   Prepared by: Derek Giang     Exercises   Supine to Left Side Lying Vestibular Habituation - 5-10 reps - 1 sets - 5 hold - 1x daily - 7x weekly   Supine to Long Sitting Vestibular Habituation - 5-10 reps - 1 sets - 1x daily - 7x weekly         Therapeutic Exercise and NMR EXR  [x] (77370) Provided verbal/tactile cueing for activities related to strengthening, flexibility, endurance, ROM for improvements in  [] LE / Lumbar: LE, proximal hip, and core control with self care, mobility, lifting, ambulation. [x] UE / Cervical: cervical, postural, scapular, scapulothoracic and UE control with self care, reaching, carrying, lifting, house/yardwork, driving, computer work.   [x] (09509) Provided verbal/tactile cueing for activities related to improving balance, coordination, kinesthetic sense, posture, motor skill, proprioception to assist with Home Exercise Program:    [] (14350) Reviewed/Progressed HEP activities related to strengthening, flexibility, endurance, ROM of   [] LE / Lumbar: core, proximal hip and LE for functional self-care, mobility, lifting and ambulation/stair navigation   [] UE / Cervical: cervical, postural, scapular, scapulothoracic and UE control with self care, reaching, carrying, lifting, house/yardwork, driving, computer work  [] (36768)Reviewed/Progressed HEP activities related to improving balance, coordination, kinesthetic sense, posture, motor skill, proprioception of   [] LE: core, proximal hip and LE for self care, mobility, lifting, and ambulation/stair navigation    [] UE / Cervical: cervical, postural,  scapular, scapulothoracic and UE control with self care, reaching, carrying, lifting, house/yardwork, driving, computer work    Manual Treatments:  PROM / STM / Oscillations-Mobs:  G-I, II, III, IV (PA's, Inf., Post.)  [x] (28290) Provided manual therapy to mobilize LE, proximal hip and/or LS spine soft tissue/joints for the purpose of modulating pain, promoting relaxation,  increasing ROM, reducing/eliminating soft tissue swelling/inflammation/restriction, improving soft tissue extensibility and allowing for proper ROM for normal function with   [] LE / lumbar: self care, mobility, lifting and ambulation. [x] UE / Cervical: self care, reaching, carrying, lifting, house/yardwork, driving, computer work. Modalities:  [] (75392) Vasopneumatic compression: Utilized vasopneumatic compression to decrease edema / swelling for the purpose of improving mobility and quad tone / recruitment which will allow for increased overall function including but not limited to self-care, transfers, ambulation, and ascending / descending stairs.      Modalities:      Charges:  Timed Code Treatment Minutes: 50   Total Treatment Minutes: 50     [] EVAL - LOW (67197)   [] EVAL - MOD (12328)  [] EVAL - HIGH (57512)  [] Smooth Ode (32172) [] VY(44787) x        [] Ionto  [x] NMR (61505) x 2      [] Vaso  [x] Manual (52574) x 1      [] Ultrasound  [] TA x        [] Mech Traction (86199)  [] Aquatic Therapy x      [] ES (un) (24710):   [] Home Management Training x  [] ES(attended) (83811)   [] Dry Needling 1-2 muscles (44485):  [] Dry Needling 3+ muscles (142017)  [] Group:      [] Other:     GOALS:   Patient stated goal: to improve dizziness with bending over   []? Progressing: []? Met: []? Not Met: []? Adjusted     Therapist goals for Patient:   Short Term Goals: To be achieved in: 2 weeks  1. Independent in HEP and progression per patient tolerance, in order to prevent re-injury. []? Progressing: []? Met: []? Not Met: []? Adjusted  2. Patient will have a decrease in dizziness/imbalance/symptoms  to facilitate improvement in movement, function, balance and ADLs as indicated by Functional Deficits. []? Progressing: []? Met: []? Not Met: []? Adjusted     Long Term Goals: To be achieved in: 6 weeks  1. Disability index score of 25% or less for the [facility] to assist with reaching prior level of function. []? Progressing: []? Met: []? Not Met: []? Adjusted  2. Patient will demonstrate increased cervical AROM to WNL, joint mobility WNL to allow for proper joint functioning as indicated by patients Functional Deficits. []? Progressing: []? Met: []? Not Met: []? Adjusted  3. Patient will demonstrate negative oculomotor special testing/positional testing as indicated by patients Functional Deficits. []? Progressing: []? Met: []? Not Met: []? Adjusted  4. Patient will return to functional activities including bending without increased symptoms or restriction. []? Progressing: []? Met: []? Not Met: []? Adjusted  5. Patient tolerate rolling to the left in bed without increase symptoms     []? Progressing: []? Met: []? Not Met: []?  Adjusted         Overall Progression Towards Functional goals/ Treatment Progress Update: [] Plan of care initiated [] Hold pending MD visit [] Discharge    Electronically signed by: Ilda Nieves PT, DPT, OMT-C    Note: If patient does not return for scheduled/ recommended follow up visits, this note will serve as a discharge from care along with most recent update on progress.

## 2021-01-26 ENCOUNTER — HOSPITAL ENCOUNTER (OUTPATIENT)
Dept: PHYSICAL THERAPY | Age: 66
Setting detail: THERAPIES SERIES
Discharge: HOME | End: 2021-01-26
Payer: MEDICARE

## 2021-01-26 PROCEDURE — 97530 THERAPEUTIC ACTIVITIES: CPT

## 2021-01-26 PROCEDURE — 97112 NEUROMUSCULAR REEDUCATION: CPT

## 2021-01-26 PROCEDURE — 97140 MANUAL THERAPY 1/> REGIONS: CPT

## 2021-01-26 NOTE — FLOWSHEET NOTE
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 Date Range     [place] Visit #3 [place] 7 visits  21 to 3/14/21 []  Yes  [x]  No Na      Latex Allergy:  [x]NO      []YES  Preferred Language for Healthcare:   [x]English       []other:    Functional Scale:          DHI: raw score = 42; dysfunction = 42%  Date assessed: 20    Pain level:  0/10     SUBJECTIVE:  The patient continues to have dizziness with bending over and sometimes rapid head movement. Says that now she is prepared to have live with this. OBJECTIVE:  21  Smooth Pursuit (H):              [x]? Normal       [x]? Abnormal    Comments: disconjugate     Smooth Pursuit (V):              []? Normal       [x]? Abnormal    Comments: disconjugate     Saccades (H):                        []? Normal       [x]? Abnormal    Comments: latent     Saccades (V):                        []? Normal       [x]? Abnormal    Comments: latent     Convergence:                        [x]? Normal       []?  Abnormal    Comments:     Head Thrust Test:                 []? Normal       []? Abnormal    Comments:      VOR Cancellation (H):          [x]? Normal       []? Abnormal    Comments:Dizziness      VOR Cancellation (V):          [x]? Normal       []? Abnormal    Comments: Dizziness , pressure on the right side of the neck      VOR (V):                                 [x]? Normal       []? Abnormal    Comments: mild dizziness , pressure in the back of the head      VOR (H):                                 [x]? Normal       []? Abnormal    Comments: mild dizziness        Positional Testing:    R Hallpike-Rushford maneuver:               Nystagmus:     []? Yes             [x]? No               []? Duration:                                          []? Direction:                  Vertigo:            []? Yes             [x]? No               []? Duration:      L Hallpike-Eitan maneuver:              Nystagmus:     []? Yes             [x]? No               []? Duration:    Dizziness and head pressure                                       []? Direction:                  Vertigo:            []? Yes             [x]? No               []? Duration:      Supine roll head right: NT              Nystagmus:     []? Yes             []? No               []? Duration:                                          []? Direction:                  Vertigo:            []? Yes             []? No               []? Duration:     Supine roll head left: NT              Nystagmus:     []? Yes             []? No               []? Duration:                                          []? Direction:                  Vertigo:            []?  Yes             []? No               []? Duration:         1/12 - C1-2 PA pressure increases dizziness     RESTRICTIONS/PRECAUTIONS:     Exercises/Interventions:     Therapeutic Exercises (94305) Resistance / level Sets/sec Reps Notes   Treadmill warmup   5 min Cervical Tucks - supine  1 / 3'' in/out 3x 1/12 - added; follows manual C1-C2 distraction   Seated [employer] Side[employer]ide R/L    1/12 - added; patient cued with TC at C2 [facility]                                              Therapeutic Activities (68036)                                          Neuromuscular Re-ed (26085)       VOR vert & Horiz  2ft &t   10 1/14 pressure on the left side back of head ; seemed like something trying to connect on the R   Folding over    Habituation in lying rolling-supine to sit      Mild dizziness    VOR Cancellation  vert & Horiz - standing   10x ea 1/5 pressure in the head tracking to left and vertical   Airex:  Head nods/turns EO/EC     1/7 Added   [facility]  (5), one set placement bwd   3xTurn Twist     12/31 LOB at the end   Quarter turns   3, 360s R/L 1/12 - dizziness to Left    Sways A/P, M/L  EO/EC           Manual Intervention (99113)       [employer] (-) however dizziness toward the left side        R Epley  performed       SOR, MET   3 min     C1-C2 Distraction R/L G2-3 10 min ea  1/12 - added   C/S Traction G1-3 10'' holds, 5 min                Pt. Education:  -patient educated on diagnosis, prognosis and expectations for rehab  -all patient questions were answered    Home Exercise Program:  Access Code: [email or web address]   URL: [email or web address]   Date: 12/29/2020   Prepared by: Celine Serrano     Exercises   Supine to Left Side Lying Vestibular Habituation - 5-10 reps - 1 sets - 5 hold - 1x daily - 7x weekly   Supine to Long Sitting Vestibular Habituation - 5-10 reps - 1 sets - 1x daily - 7x weekly         Therapeutic Exercise and NMR EXR  [x] (85922) Provided verbal/tactile cueing for activities related to strengthening, flexibility, endurance, ROM for improvements in  [] LE / Lumbar: LE, proximal hip, and core control with self care, mobility, lifting, ambulation. [x] UE / Cervical: cervical, postural, scapular, scapulothoracic and UE control with self care, reaching, carrying, lifting, house/yardwork, driving, computer work. [x] (39696) Provided verbal/tactile cueing for activities related to improving balance, coordination, kinesthetic sense, posture, motor skill, proprioception to assist with   [] LE / lumbar: LE, proximal hip, and core control in self care, mobility, lifting, ambulation and eccentric single leg control. [x] UE / cervical: cervical, scapular, scapulothoracic and UE control with self care, reaching, carrying, lifting, house/yardwork, driving, computer work.   [] (99399) Therapist is in constant attendance of 2 or more patients providing skilled therapy interventions, but not providing any significant amount of measurable one-on-one time to either patient, for improvements in  [] LE / lumbar: LE, proximal hip, and core control in self care, mobility, lifting, ambulation and eccentric single leg control. [] UE / cervical: cervical, scapular, scapulothoracic and UE control with self care, reaching, carrying, lifting, house/yardwork, driving, computer work.      NMR and Therapeutic Activities:    [x] (50241 or 09913) Provided verbal/tactile cueing for activities related to improving balance, coordination, kinesthetic sense, posture, motor skill, proprioception and motor activation to allow for proper function of   [] LE: / Lumbar core, proximal hip and LE with self care and ADLs  [x] UE / Cervical: cervical, postural, scapular, scapulothoracic and UE control with self care, carrying, lifting, driving, computer work.   [] (75675) Gait Re-education- Provided training and instruction to the patient for proper LE, core and proximal hip recruitment and positioning and eccentric body weight control with ambulation re-education including up and down stairs     Home Management Training / Self Care: [] (12103) Provided self-care/home management training related to activities of daily living and compensatory training, and/or use of adaptive equipment for improvement with: ADLs and compensatory training, meal preparation, safety procedures and instruction in use of adaptive equipment, including bathing, grooming, dressing, personal hygiene, basic household cleaning and chores. Home Exercise Program:    [] (41545) Reviewed/Progressed HEP activities related to strengthening, flexibility, endurance, ROM of   [] LE / Lumbar: core, proximal hip and LE for functional self-care, mobility, lifting and ambulation/stair navigation   [] UE / Cervical: cervical, postural, scapular, scapulothoracic and UE control with self care, reaching, carrying, lifting, house/yardwork, driving, computer work  [] (49106)Reviewed/Progressed HEP activities related to improving balance, coordination, kinesthetic sense, posture, motor skill, proprioception of   [] LE: core, proximal hip and LE for self care, mobility, lifting, and ambulation/stair navigation    [] UE / Cervical: cervical, postural,  scapular, scapulothoracic and UE control with self care, reaching, carrying, lifting, house/yardwork, driving, computer work    Manual Treatments:  PROM / STM / Oscillations-Mobs:  G-I, II, III, IV (PA's, Inf., Post.)  [x] (86548) Provided manual therapy to mobilize LE, proximal hip and/or LS spine soft tissue/joints for the purpose of modulating pain, promoting relaxation,  increasing ROM, reducing/eliminating soft tissue swelling/inflammation/restriction, improving soft tissue extensibility and allowing for proper ROM for normal function with   [] LE / lumbar: self care, mobility, lifting and ambulation. [x] UE / Cervical: self care, reaching, carrying, lifting, house/yardwork, driving, computer work.      Modalities: [] (82092) Vasopneumatic compression: Utilized vasopneumatic compression to decrease edema / swelling for the purpose of improving mobility and quad tone / recruitment which will allow for increased overall function including but not limited to self-care, transfers, ambulation, and ascending / descending stairs. Modalities:      Charges:  Timed Code Treatment Minutes: 55   Total Treatment Minutes: 55     [] EVAL - LOW (74225)   [] EVAL - MOD (95691)  [] EVAL - HIGH (20732)  [] RE-EVAL (80968)  [] YK(46530) x        [] Ionto  [x] NMR (13265) x 2     [] Vaso  [x] Manual (85365) x 1      [] Ultrasound  [x] TA x 1       [] Mech Traction (18275)  [] Aquatic Therapy x      [] ES (un) (61027):   [] Home Management Training x  [] ES(attended) (59445)   [] Dry Needling 1-2 muscles (15576):  [] Dry Needling 3+ muscles (135856)  [] Group:      [] Other:     GOALS:   Patient stated goal: to improve dizziness with bending over   []? Progressing: []? Met: []? Not Met: []? Adjusted     Therapist goals for Patient:   Short Term Goals: To be achieved in: 2 weeks  1. Independent in HEP and progression per patient tolerance, in order to prevent re-injury. []? Progressing: []? Met: []? Not Met: []? Adjusted  2. Patient will have a decrease in dizziness/imbalance/symptoms  to facilitate improvement in movement, function, balance and ADLs as indicated by Functional Deficits. []? Progressing: []? Met: []? Not Met: []? Adjusted     Long Term Goals: To be achieved in: 6 weeks  1. Disability index score of 25% or less for the [facility] to assist with reaching prior level of function. []? Progressing: []? Met: []? Not Met: []? Adjusted  2. Patient will demonstrate increased cervical AROM to WNL, joint mobility WNL to allow for proper joint functioning as indicated by patients Functional Deficits. []? Progressing: []? Met: []? Not Met: []?  Adjusted 3. Patient will demonstrate negative oculomotor special testing/positional testing as indicated by patients Functional Deficits. []? Progressing: []? Met: []? Not Met: []? Adjusted  4. Patient will return to functional activities including bending without increased symptoms or restriction. []? Progressing: []? Met: []? Not Met: []? Adjusted  5. Patient tolerate rolling to the left in bed without increase symptoms     []? Progressing: []? Met: []? Not Met: []? Adjusted         Overall Progression Towards Functional goals/ Treatment Progress Update:  [] Patient is progressing as expected towards functional goals listed. [] Progression is slowed due to complexities/Impairments listed. [] Progression has been slowed due to co-morbidities. [x] Plan just implemented, too soon to assess goals progression <30days   [] Goals require adjustment due to lack of progress  [] Patient is not progressing as expected and requires additional follow up with physician  [] Other    Persisting Functional Limitations/Impairments:  []Sleeping []Sitting               []Standing []Transfers        []Walking []Kneeling               []Stairs []Squatting / bending   []ADLs []Reaching  []Lifting  []Housework  []Driving []Job related tasks  []Sports/Recreation [x]Other: bending, rolling over to the left in bed        ASSESSMENT:  The patient reassessed with Oculomotor and vestibular function. Patient presented with central origin issues evident decrease oculomotor function and dizziness . Patient continues to have posterior cervical muscle tightness and moderate dizziness with bending. Patient will benefit from oculomotor exercise activity combined with habituation as tolerated.               Treatment/Activity Tolerance:  [x] Patient able to complete tx  [] Patient limited by fatigue  [] Patient limited by pain  [] Patient limited by other medical complications  [] Other:     Prognosis: [x] Good [] Fair  [] Poor Patient Requires Follow-up: [x] Yes  [] No    Plan for next treatment session: habituation exercises as tolerated, manual for capital/cervial mobility and stretches    PLAN: See eval. PT 2x / week for 6 weeks. [x] Continue per plan of care [] Alter current plan (see comments)  [] Plan of care initiated [] Hold pending MD visit [] Discharge    Electronically signed by: Ashwin Dixon PT, DPT, OMT-C    Note: If patient does not return for scheduled/ recommended follow up visits, this note will serve as a discharge from care along with most recent update on progress.

## 2021-01-28 ENCOUNTER — HOSPITAL ENCOUNTER (OUTPATIENT)
Dept: PHYSICAL THERAPY | Age: 66
Setting detail: THERAPIES SERIES
Discharge: HOME | End: 2021-01-28
Payer: MEDICARE

## 2021-01-28 PROCEDURE — 97112 NEUROMUSCULAR REEDUCATION: CPT

## 2021-01-28 NOTE — FLOWSHEET NOTE
Date Range for reporting period:  Beginnin20  Ending:    Progress report due (10 Rx/or 30 days whichever is less): visit #59   Recertification due (POC duration/ or 90 days whichever is less): visit # 3/29/21    Visit # Insurance Allowable Auth required? Date Range   10/12  [place] Visit #4 [place] visits  21 to 3/14/21 []  Yes  [x]  No Na      Latex Allergy:  [x]NO      []YES  Preferred Language for Healthcare:   [x]English       []other:    Functional Scale:   DHI: Raw score= 40; dysfunction= 40%  Date assessed : 21       DHI: raw score = 42; dysfunction = 42%  Date assessed: 20    Pain level:  0/10     SUBJECTIVE:  The patient reports that she had episode of spinning while driving in the car that lasted only a day. OBJECTIVE:  21  Smooth Pursuit (H):              [x]? Normal       [x]? Abnormal    Comments: disconjugate     Smooth Pursuit (V):              []? Normal       [x]? Abnormal    Comments: disconjugate     Saccades (H):                        []? Normal       [x]? Abnormal    Comments: latent     Saccades (V):                        []? Normal       [x]? Abnormal    Comments: latent     Convergence:                        [x]? Normal       []? Abnormal    Comments:      Head Thrust Test:                 []? Normal       []? Abnormal    Comments:      VOR Cancellation (H):          [x]? Normal       []? Abnormal    Comments:Dizziness      VOR Cancellation (V):          [x]? Normal       []? Abnormal    Comments: Dizziness , pressure on the right side of the neck      VOR (V):                                 [x]? Normal       []? Abnormal    Comments: mild dizziness , pressure in the back of the head      VOR (H):                                 [x]? Normal       []? Abnormal    Comments: mild dizziness        Positional Testing:    R Hallpike-Butler maneuver:               Nystagmus:     []? Yes             [x]?  No               []? Duration: []? Direction:                  Vertigo:            []? Yes             [x]? No               []? Duration:      L Hallpike-[place] maneuver:              Nystagmus:     []? Yes             [x]? No               []? Duration:    Dizziness and head pressure                                       []? Direction:                  Vertigo:            []? Yes             [x]? No               []? Duration:      Supine roll head right: NT              Nystagmus:     []? Yes             []? No               []? Duration:                                          []? Direction:                  Vertigo:            []? Yes             []? No               []? Duration:     Supine roll head left: NT              Nystagmus:     []? Yes             []? No               []? Duration:                                          []? Direction:                  Vertigo:            []?  Yes             []? No               []? Duration:         1/12 - C1-2 PA pressure increases dizziness     RESTRICTIONS/PRECAUTIONS:     Exercises/Interventions:     Therapeutic Exercises (69684) Resistance / level Sets/sec Reps Notes   Treadmill warmup   5 min     Cervical Tucks - supine  1 / 3'' in/out 3x 1/12 - added; follows manual C1-C2 distraction   Seated [employer] Sideglide R/L    1/12 - added; patient cued with TC at C2 TVP                                              Therapeutic Activities (59238)                                          Neuromuscular Re-ed (10536)       VOR vert & Horiz  2ft &t   10 1/14 pressure on the left side back of head ; seemed like something trying to connect on the R   Smooth Pursuit  (Vert & Horiz)  Saccadic  (Vert & Horiz)  VOR x2  Head and eyes follow   10  10  10 1/28/21   Folding over    Habituation in lying rolling-supine to sit      Mild dizziness    VOR Cancellation  vert & Horiz - standing   10x ea 1/5 pressure in the head tracking to left and vertical   Airex: Head nods/turns EO/EC     1/7 Added   [facility]  (5), one set placement bwd   3xTurn Twist     12/31 LOB at the end   Quarter turns   3, 360s R/L 1/12 - dizziness to Left    Sways A/P, M/L  EO/EC           Manual Intervention (40399)       [employer] (-) however dizziness toward the left side   Roll Test       R Epley  performed       SOR, MET   3 min     C1-C2 Distraction R/L G2-3 10 min ea  1/12 - added   C/S Traction G1-3 10'' holds, 5 min                Pt. Education:  -patient educated on diagnosis, prognosis and expectations for rehab  -all patient questions were answered    Home Exercise Program:  Access Code: [email or web address]   URL: [email or web address]   Date: 12/29/2020   Prepared by: Sanjuanita Ortiz     Exercises   Supine to Left Side Lying Vestibular Habituation - 5-10 reps - 1 sets - 5 hold - 1x daily - 7x weekly   Supine to Long Sitting Vestibular Habituation - 5-10 reps - 1 sets - 1x daily - 7x weekly         Therapeutic Exercise and NMR EXR  [x] (10009) Provided verbal/tactile cueing for activities related to strengthening, flexibility, endurance, ROM for improvements in  [] LE / Lumbar: LE, proximal hip, and core control with self care, mobility, lifting, ambulation. [x] UE / Cervical: cervical, postural, scapular, scapulothoracic and UE control with self care, reaching, carrying, lifting, house/yardwork, driving, computer work. [x] (65765) Provided verbal/tactile cueing for activities related to improving balance, coordination, kinesthetic sense, posture, motor skill, proprioception to assist with   [] LE / lumbar: LE, proximal hip, and core control in self care, mobility, lifting, ambulation and eccentric single leg control. [x] UE / cervical: cervical, scapular, scapulothoracic and UE control with self care, reaching, carrying, lifting, house/yardwork, driving, computer work. [] (06223) Therapist is in constant attendance of 2 or more patients providing skilled therapy interventions, but not providing any significant amount of measurable one-on-one time to either patient, for improvements in  [] LE / lumbar: LE, proximal hip, and core control in self care, mobility, lifting, ambulation and eccentric single leg control. [] UE / cervical: cervical, scapular, scapulothoracic and UE control with self care, reaching, carrying, lifting, house/yardwork, driving, computer work. NMR and Therapeutic Activities:    [x] (68868 or 59882) Provided verbal/tactile cueing for activities related to improving balance, coordination, kinesthetic sense, posture, motor skill, proprioception and motor activation to allow for proper function of   [] LE: / Lumbar core, proximal hip and LE with self care and ADLs  [x] UE / Cervical: cervical, postural, scapular, scapulothoracic and UE control with self care, carrying, lifting, driving, computer work.   [] (73300) Gait Re-education- Provided training and instruction to the patient for proper LE, core and proximal hip recruitment and positioning and eccentric body weight control with ambulation re-education including up and down stairs     Home Management Training / Self Care:  [] (43144) Provided self-care/home management training related to activities of daily living and compensatory training, and/or use of adaptive equipment for improvement with: ADLs and compensatory training, meal preparation, safety procedures and instruction in use of adaptive equipment, including bathing, grooming, dressing, personal hygiene, basic household cleaning and chores.      Home Exercise Program:    [] (05378) Reviewed/Progressed HEP activities related to strengthening, flexibility, endurance, ROM of   [] LE / Lumbar: core, proximal hip and LE for functional self-care, mobility, lifting and ambulation/stair navigation [] UE / Cervical: cervical, postural, scapular, scapulothoracic and UE control with self care, reaching, carrying, lifting, house/yardwork, driving, computer work  [] (59355)Reviewed/Progressed HEP activities related to improving balance, coordination, kinesthetic sense, posture, motor skill, proprioception of   [] LE: core, proximal hip and LE for self care, mobility, lifting, and ambulation/stair navigation    [] UE / Cervical: cervical, postural,  scapular, scapulothoracic and UE control with self care, reaching, carrying, lifting, house/yardwork, driving, computer work    Manual Treatments:  PROM / STM / Oscillations-Mobs:  G-I, II, III, IV (PA's, Inf., Post.)  [x] (78735) Provided manual therapy to mobilize LE, proximal hip and/or LS spine soft tissue/joints for the purpose of modulating pain, promoting relaxation,  increasing ROM, reducing/eliminating soft tissue swelling/inflammation/restriction, improving soft tissue extensibility and allowing for proper ROM for normal function with   [] LE / lumbar: self care, mobility, lifting and ambulation. [x] UE / Cervical: self care, reaching, carrying, lifting, house/yardwork, driving, computer work. Modalities:  [] (70453) Vasopneumatic compression: Utilized vasopneumatic compression to decrease edema / swelling for the purpose of improving mobility and quad tone / recruitment which will allow for increased overall function including but not limited to self-care, transfers, ambulation, and ascending / descending stairs.      Modalities:      Charges:  Timed Code Treatment Minutes: 44   Total Treatment Minutes: 44     [] EVAL - LOW (29154)   [] EVAL - MOD (79574)  [] EVAL - HIGH (35239)  [] RE-EVAL (43325)  [] VO(68216) x        [] Ionto  [x] NMR (94048) x 3     [] Vaso  [] Manual (47652) x       [] Ultrasound  [] TA x        [] Mech Traction (19861)  [] Aquatic Therapy x      [] ES (un) (47008):   [] Home Management Training x  [] ES(attended) (12640) [] Dry Needling 1-2 muscles (10268):  [] Dry Needling 3+ muscles (882045)  [] Group:      [] Other:     GOALS:   Patient stated goal: to improve dizziness with bending over   []? Progressing: []? Met: []? Not Met: []? Adjusted     Therapist goals for Patient:   Short Term Goals: To be achieved in: 2 weeks  1. Independent in HEP and progression per patient tolerance, in order to prevent re-injury. []? Progressing: []? Met: []? Not Met: []? Adjusted  2. Patient will have a decrease in dizziness/imbalance/symptoms  to facilitate improvement in movement, function, balance and ADLs as indicated by Functional Deficits. []? Progressing: []? Met: []? Not Met: []? Adjusted     Long Term Goals: To be achieved in: 6 weeks  1. Disability index score of 25% or less for the [facility] to assist with reaching prior level of function. []? Progressing: []? Met: []? Not Met: []? Adjusted  2. Patient will demonstrate increased cervical AROM to WNL, joint mobility WNL to allow for proper joint functioning as indicated by patients Functional Deficits. []? Progressing: []? Met: []? Not Met: []? Adjusted  3. Patient will demonstrate negative oculomotor special testing/positional testing as indicated by patients Functional Deficits. []? Progressing: []? Met: []? Not Met: []? Adjusted  4. Patient will return to functional activities including bending without increased symptoms or restriction. []? Progressing: []? Met: []? Not Met: []? Adjusted  5. Patient tolerate rolling to the left in bed without increase symptoms     []? Progressing: []? Met: []? Not Met: []? Adjusted         Overall Progression Towards Functional goals/ Treatment Progress Update:  [] Patient is progressing as expected towards functional goals listed. [] Progression is slowed due to complexities/Impairments listed. [] Progression has been slowed due to co-morbidities.   [x] Plan just implemented, too soon to assess goals progression <30days [] Goals require adjustment due to lack of progress  [] Patient is not progressing as expected and requires additional follow up with physician  [] Other    Persisting Functional Limitations/Impairments:  []Sleeping []Sitting               []Standing []Transfers        []Walking []Kneeling               []Stairs []Squatting / bending   []ADLs []Reaching  []Lifting  []Housework  []Driving []Job related tasks  []Sports/Recreation [x]Other: bending, rolling over to the left in bed        ASSESSMENT:  The patient presents with decrease oculomotor function with sacc/ smooth pursuit and VOR indicated central disturbance vestibular issue. The patient also demonstrates increase motion sensitivity with supine to sit and bending. The patient to benefit from habituation activities to help with reducing motion sensitivity to further reduce symptoms. Concern with occasional spinning episodes not peripheral vertigo in origin. Will monitor and recommend checking Vitamin D and B12 levels as needed. Treatment/Activity Tolerance:  [x] Patient able to complete tx  [] Patient limited by fatigue  [] Patient limited by pain  [] Patient limited by other medical complications  [] Other:     Prognosis: [x] Good [] Fair  [] Poor    Patient Requires Follow-up: [x] Yes  [] No    Plan for next treatment session: habituation exercises as tolerated, manual for capital/cervial mobility and stretches    PLAN: See [facility]. PT 2x / week for 6 weeks. [x] Continue per plan of care [] Alter current plan (see comments)  [] Plan of care initiated [] Hold pending MD visit [] Discharge    Electronically signed by: Hu Davis PT, DPT, OMT-C    Note: If patient does not return for scheduled/ recommended follow up visits, this note will serve as a discharge from care along with most recent update on progress.

## 2021-02-02 ENCOUNTER — HOSPITAL ENCOUNTER (OUTPATIENT)
Dept: PHYSICAL THERAPY | Age: 66
Setting detail: THERAPIES SERIES
Discharge: HOME | End: 2021-02-02
Payer: MEDICARE

## 2021-02-02 PROCEDURE — 97530 THERAPEUTIC ACTIVITIES: CPT

## 2021-02-02 NOTE — FLOWSHEET NOTE
[facility]  Outpatient Physical Therapy  Phone: [phone]   Fax: [phone]      Recommendation:    [x]Continue PT 1x / wk for 4 weeks. []Hold PT, pending MD visit    Physical Therapy Daily Treatment Note  Date:  2021    Patient Name:  Jose Luis Day    :  1955  MRN: [id number]  Medical/Treatment Diagnosis Information:  · Diagnosis: Vertigo of central origin H81.4  · Treatment Diagnosis: vestibular hypofunction and imbalance  Insurance/Certification information:  PT Insurance Information: Medicare  Physician Information:  Referring Practitioner: Yolanda Cantu MD  Plan of care signed (Y/N): []  Yes [x]  No     Date of Patient follow up with Physician: FILI      Progress Report: []  Yes  [x]  No     Date Range for reporting period:  Beginnin20  Ending:    Progress report due (10 Rx/or 30 days whichever is less): visit #62   Recertification due (POC duration/ or 90 days whichever is less): visit # 3/29/21    Visit # Insurance Allowable Auth required? Date Range     [place] Visit #5 [place] 7 visits  21 to 3/14/21 []  Yes  [x]  No Na      Latex Allergy:  [x]NO      []YES  Preferred Language for Healthcare:   [x]English       []other:    Functional Scale:   DHI: Raw score= 40; dysfunction= 40%  Date assessed : 21       DHI: raw score = 42; dysfunction = 42%  Date assessed: 20    Pain level:  0/10     SUBJECTIVE:  The patient reports that she had some mild dizziness of the last week. OBJECTIVE:  21  Smooth Pursuit (H):              [x]? Normal       [x]? Abnormal    Comments: disconjugate     Smooth Pursuit (V):              []? Normal       [x]? Abnormal    Comments: disconjugate     Saccades (H):                        []? Normal       [x]? Abnormal    Comments: latent     Saccades (V):                        []? Normal       [x]?  Abnormal    Comments: latent    Convergence:                        [x]? Normal       []? Abnormal    Comments:      Head Thrust Test:                 []? Normal       []? Abnormal    Comments:      VOR Cancellation (H):          [x]? Normal       []? Abnormal    Comments:Dizziness      VOR Cancellation (V):          [x]? Normal       []? Abnormal    Comments: Dizziness , pressure on the right side of the neck      VOR (V):                                 [x]? Normal       []? Abnormal    Comments: mild dizziness , pressure in the back of the head      VOR (H):                                 [x]? Normal       []? Abnormal    Comments: mild dizziness        Positional Testing:    R Hallpike-Stanchfield maneuver:               Nystagmus:     []? Yes             [x]? No               []? Duration:                                          []? Direction:                  Vertigo:            []? Yes             [x]? No               []? Duration:      L Hallpike-Eitan maneuver:              Nystagmus:     []? Yes             [x]? No               []? Duration:    Dizziness and head pressure                                       []? Direction:                  Vertigo:            []? Yes             [x]? No               []? Duration:      Supine roll head right: NT              Nystagmus:     []? Yes             []? No               []? Duration:                                          []? Direction:                  Vertigo:            []? Yes             []? No               []? Duration:     Supine roll head left: NT              Nystagmus:     []? Yes             []? No               []? Duration:                                          []? Direction:                  Vertigo:            []?  Yes             []? No               []? Duration:         1/12 - C1-2 PA pressure increases dizziness     RESTRICTIONS/PRECAUTIONS:     Exercises/Interventions:     Therapeutic Exercises (32644) Resistance / level Sets/sec Reps Notes   Treadmill warmup   5 min Cervical Tucks - supine  1 / 3'' in/out 3x 1/12 - added; follows manual C1-C2 distraction   Seated [employer] Sideglide R/L    1/12 - added; patient cued with TC at C2 TVP                                              Therapeutic Activities (54612)                                          Neuromuscular Re-ed (82519)       VOR vert & Horiz  2ft &t   10 1/14 pressure on the left side back of head ; seemed like something trying to connect on the R   Smooth Pursuit  (Vert & Horiz)  Saccadic  (Vert & Horiz)  VOR x2  Head and eyes follow   10  10  10 1/28/21   Folding over    Habituation in -supine to sit  Supine to sit turning left      10x5 x    5x Mild dizziness    VOR Cancellation  vert & Horiz - standing   10x ea 1/5 pressure in the head tracking to left and vertical   Airex:  Head nods/turns EO/EC     1/7 Added   [facility]  (5), one set placement bwd   3xTurn Twist     12/31 LOB at the end   Quarter turns   3, 360s R/L 1/12 - dizziness to Left    Sways A/P, M/L  EO/EC           Manual Intervention (54019)       [employer] (-) however dizziness toward the left side   Roll Test       R Epley  performed       SOR, MET   3 min     C1-C2 Distraction R/L G2-3 10 min ea  1/12 - added   C/S Traction G1-3 10'' holds, 5 min                Pt. Education:  -patient educated on diagnosis, prognosis and expectations for rehab  -all patient questions were answered    Home Exercise Program:  Access Code: [email or web address]   URL: [email or web address]   Date: 12/29/2020   Prepared by: Jennifer Jerry     Exercises   Supine to Left Side Lying Vestibular Habituation - 5-10 reps - 1 sets - 5 hold - 1x daily - 7x weekly   Supine to Long Sitting Vestibular Habituation - 5-10 reps - 1 sets - 1x daily - 7x weekly         Therapeutic Exercise and NMR EXR  [x] (89175) Provided verbal/tactile cueing for activities related to strengthening, flexibility, endurance, ROM for improvements in Home Management Training / Self Care:  [] (41822) Provided self-care/home management training related to activities of daily living and compensatory training, and/or use of adaptive equipment for improvement with: ADLs and compensatory training, meal preparation, safety procedures and instruction in use of adaptive equipment, including bathing, grooming, dressing, personal hygiene, basic household cleaning and chores. Home Exercise Program:    [] (60731) Reviewed/Progressed HEP activities related to strengthening, flexibility, endurance, ROM of   [] LE / Lumbar: core, proximal hip and LE for functional self-care, mobility, lifting and ambulation/stair navigation   [] UE / Cervical: cervical, postural, scapular, scapulothoracic and UE control with self care, reaching, carrying, lifting, house/yardwork, driving, computer work  [] (95205)Reviewed/Progressed HEP activities related to improving balance, coordination, kinesthetic sense, posture, motor skill, proprioception of   [] LE: core, proximal hip and LE for self care, mobility, lifting, and ambulation/stair navigation    [] UE / Cervical: cervical, postural,  scapular, scapulothoracic and UE control with self care, reaching, carrying, lifting, house/yardwork, driving, computer work    Manual Treatments:  PROM / STM / Oscillations-Mobs:  G-I, II, III, IV (PA's, Inf., Post.)  [x] (42934) Provided manual therapy to mobilize LE, proximal hip and/or LS spine soft tissue/joints for the purpose of modulating pain, promoting relaxation,  increasing ROM, reducing/eliminating soft tissue swelling/inflammation/restriction, improving soft tissue extensibility and allowing for proper ROM for normal function with   [] LE / lumbar: self care, mobility, lifting and ambulation. [x] UE / Cervical: self care, reaching, carrying, lifting, house/yardwork, driving, computer work.      Modalities: [] (41888) Vasopneumatic compression: Utilized vasopneumatic compression to decrease edema / swelling for the purpose of improving mobility and quad tone / recruitment which will allow for increased overall function including but not limited to self-care, transfers, ambulation, and ascending / descending stairs. Modalities:      Charges:  Timed Code Treatment Minutes: 40   Total Treatment Minutes: 40     [] EVAL - LOW (00266)   [] EVAL - MOD (17849)  [] EVAL - HIGH (99554)  [] RE-EVAL (46911)  [] JQ(05199) x        [] Ionto  [] NMR (93508) x     [] Vaso  [] Manual (43456) x       [] Ultrasound  [x] TA x  3      [] Mech Traction (23311)  [] Aquatic Therapy x      [] ES (un) (10293):   [] Home Management Training x  [] ES(attended) (14584)   [] Dry Needling 1-2 muscles (21968):  [] Dry Needling 3+ muscles (299451)  [] Group:      [] Other:     GOALS:   Patient stated goal: to improve dizziness with bending over   []? Progressing: []? Met: []? Not Met: []? Adjusted     Therapist goals for Patient:   Short Term Goals: To be achieved in: 2 weeks  1. Independent in HEP and progression per patient tolerance, in order to prevent re-injury. []? Progressing: []? Met: []? Not Met: []? Adjusted  2. Patient will have a decrease in dizziness/imbalance/symptoms  to facilitate improvement in movement, function, balance and ADLs as indicated by Functional Deficits. []? Progressing: []? Met: []? Not Met: []? Adjusted     Long Term Goals: To be achieved in: 6 weeks  1. Disability index score of 25% or less for the [facility] to assist with reaching prior level of function. []? Progressing: []? Met: []? Not Met: []? Adjusted  2. Patient will demonstrate increased cervical AROM to WNL, joint mobility WNL to allow for proper joint functioning as indicated by patients Functional Deficits. []? Progressing: []? Met: []? Not Met: []?  Adjusted 3. Patient will demonstrate negative oculomotor special testing/positional testing as indicated by patients Functional Deficits. []? Progressing: []? Met: []? Not Met: []? Adjusted  4. Patient will return to functional activities including bending without increased symptoms or restriction. []? Progressing: []? Met: []? Not Met: []? Adjusted  5. Patient tolerate rolling to the left in bed without increase symptoms     []? Progressing: []? Met: []? Not Met: []? Adjusted         Overall Progression Towards Functional goals/ Treatment Progress Update:  [] Patient is progressing as expected towards functional goals listed. [] Progression is slowed due to complexities/Impairments listed. [] Progression has been slowed due to co-morbidities. [x] Plan just implemented, too soon to assess goals progression <30days   [] Goals require adjustment due to lack of progress  [] Patient is not progressing as expected and requires additional follow up with physician  [] Other    Persisting Functional Limitations/Impairments:  []Sleeping []Sitting               []Standing []Transfers        []Walking []Kneeling               []Stairs []Squatting / bending   []ADLs []Reaching  []Lifting  []Housework  []Driving []Job related tasks  []Sports/Recreation [x]Other: bending, rolling over to the left in bed        ASSESSMENT:  The patient able to perform habituation exercises supine to sit along with head turn R/L with minimal dizziness and pressure with looking left only. Oculomotor function exercises performed in all directions. Patient will continue to benefit from further habituation activity. The patient presents with decrease oculomotor function with sacc/ smooth pursuit and VOR indicated central disturbance vestibular issue. The patient also demonstrates increase motion sensitivity with supine to sit and bending. The patient to benefit from habituation activities to help with reducing motion sensitivity to further reduce symptoms. Concern with occasional spinning episodes not peripheral vertigo in origin. Will monitor and recommend checking Vitamin D and B12 levels as needed. Treatment/Activity Tolerance:  [x] Patient able to complete tx  [] Patient limited by fatigue  [] Patient limited by pain  [] Patient limited by other medical complications  [] Other:     Prognosis: [x] Good [] Fair  [] Poor    Patient Requires Follow-up: [x] Yes  [] No    Plan for next treatment session: habituation exercises as tolerated, manual for capital/cervial mobility and stretches    PLAN: See rubina. PT 2x / week for 6 weeks. [x] Continue per plan of care [] Alter current plan (see comments)  [] Plan of care initiated [] Hold pending MD visit [] Discharge    Electronically signed by: González Alcantar PT, DPT,     Note: If patient does not return for scheduled/ recommended follow up visits, this note will serve as a discharge from care along with most recent update on progress.

## 2021-02-04 ENCOUNTER — HOSPITAL ENCOUNTER (OUTPATIENT)
Dept: PHYSICAL THERAPY | Age: 66
Setting detail: THERAPIES SERIES
Discharge: HOME | End: 2021-02-04
Payer: MEDICARE

## 2021-02-04 PROCEDURE — 97112 NEUROMUSCULAR REEDUCATION: CPT

## 2021-02-04 NOTE — FLOWSHEET NOTE
[facility]  Outpatient Physical Therapy  Phone: [phone]   Fax: [phone]      Recommendation:    [x]Continue PT 1x / wk for 4 weeks. 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 Date Range     [place] Visit #6 [place] 7 visits  21 to 3/14/21 []  Yes  [x]  No Na      Latex Allergy:  [x]NO      []YES  Preferred Language for Healthcare:   [x]English       []other:    Functional Scale:   DHI: Raw score= 40; dysfunction= 40%  Date assessed : 21       DHI: raw score = 42; dysfunction = 42%  Date assessed: 20    Pain level:  0/10     SUBJECTIVE:  The patient reports that she had some mild dizziness of the last week. OBJECTIVE:  21  Smooth Pursuit (H):              [x]? Normal       [x]? Abnormal    Comments: disconjugate     Smooth Pursuit (V):              []? Normal       [x]? Abnormal    Comments: disconjugate     Saccades (H):                        []? Normal       [x]? Abnormal    Comments: latent     Saccades (V):                        []? Normal       [x]?  Abnormal    Comments: latent    Convergence:                        [x]? Normal       []? Abnormal    Comments:      Head Thrust Test:                 []? Normal       []? Abnormal    Comments:      VOR Cancellation (H):          [x]? Normal       []? Abnormal    Comments:Dizziness      VOR Cancellation (V):          [x]? Normal       []? Abnormal    Comments: Dizziness , pressure on the right side of the neck      VOR (V):                                 [x]? Normal       []? Abnormal    Comments: mild dizziness , pressure in the back of the head      VOR (H):                                 [x]? Normal       []? Abnormal    Comments: mild dizziness        Positional Testing:    R Hallpike-[place] maneuver:               Nystagmus:     []? Yes             [x]? No               []? Duration:                                          []? Direction:                  Vertigo:            []? Yes             [x]? No               []? Duration:      L Hallpike-[place] maneuver:              Nystagmus:     []? Yes             [x]? No               []? Duration:    Dizziness and head pressure                                       []? Direction:                  Vertigo:            []? Yes             [x]? No               []? Duration:      Supine roll head right: NT              Nystagmus:     []? Yes             []? No               []? Duration:                                          []? Direction:                  Vertigo:            []? Yes             []? No               []? Duration:     Supine roll head left: NT              Nystagmus:     []? Yes             []? No               []? Duration:                                          []? Direction:                  Vertigo:            []?  Yes             []? No               []? Duration:         1/12 - C1-2 PA pressure increases dizziness     RESTRICTIONS/PRECAUTIONS:     Exercises/Interventions:     Therapeutic Exercises (82323) Resistance / level Sets/sec Reps Notes   Treadmill warmup   5 min Cervical Tucks - supine  1 / 3'' in/out  1/12 - added; follows manual C1-C2 distraction   Seated [employer] Sideglide R/L    1/12 - added; patient cued with TC at C2 TVP                                              Therapeutic Activities (34071)                                          Neuromuscular Re-ed (80173)       VOR vert & Horiz  2ft &t   10 1/14 pressure on the left side back of head ; seemed like something trying to connect on the R   Smooth Pursuit  (Vert & Horiz)  Saccadic  (Vert & Horiz)  VOR x2  Head and eyes follow   10  10  10 1/28/21   Folding over    Habituation in -supine to sit  Supine to sit turning left      10x     Mild dizziness    VOR Cancellation  vert & Horiz - standing   10x ea 1/5 pressure in the head tracking to left and vertical   Airex:  Head nods/turns EO/EC     1/7 Added   Cone  (5), one set placement bwd   3xTurn Twist     12/31 LOB at the end   Quarter turns    1/12 - dizziness to Left    Sways A/P, M/L  EO/EC           Manual Intervention (18966)       [employer] (-) however dizziness toward the left side   Roll Test       R Epley  performed       SOR, MET       C1-C2 Distraction R/L G2-3  1/12 - added   C/S Traction G1-3                Pt. Education:  -patient educated on diagnosis, prognosis and expectations for rehab  -all patient questions were answered    Home Exercise Program:  Access Code: [email or web address]   URL: [email or web address]   Date: 12/29/2020   Prepared by: Hermann Zuniga     Exercises   Supine to Left Side Lying Vestibular Habituation - 5-10 reps - 1 sets - 5 hold - 1x daily - 7x weekly   Supine to Long Sitting Vestibular Habituation - 5-10 reps - 1 sets - 1x daily - 7x weekly         Therapeutic Exercise and NMR EXR  [x] (70660) Provided verbal/tactile cueing for activities related to strengthening, flexibility, endurance, ROM for improvements in  [] LE / Lumbar: LE, proximal hip, and core control with self care, mobility, lifting, ambulation. [x] UE / Cervical: cervical, postural, scapular, scapulothoracic and UE control with self care, reaching, carrying, lifting, house/yardwork, driving, computer work. [x] (49819) Provided verbal/tactile cueing for activities related to improving balance, coordination, kinesthetic sense, posture, motor skill, proprioception to assist with   [] LE / lumbar: LE, proximal hip, and core control in self care, mobility, lifting, ambulation and eccentric single leg control. [x] UE / cervical: cervical, scapular, scapulothoracic and UE control with self care, reaching, carrying, lifting, house/yardwork, driving, computer work.   [] (93825) Therapist is in constant attendance of 2 or more patients providing skilled therapy interventions, but not providing any significant amount of measurable one-on-one time to either patient, for improvements in  [] LE / lumbar: LE, proximal hip, and core control in self care, mobility, lifting, ambulation and eccentric single leg control. [] UE / cervical: cervical, scapular, scapulothoracic and UE control with self care, reaching, carrying, lifting, house/yardwork, driving, computer work.      NMR and Therapeutic Activities:    [x] (13341 or 82920) Provided verbal/tactile cueing for activities related to improving balance, coordination, kinesthetic sense, posture, motor skill, proprioception and motor activation to allow for proper function of   [] LE: / Lumbar core, proximal hip and LE with self care and ADLs  [x] UE / Cervical: cervical, postural, scapular, scapulothoracic and UE control with self care, carrying, lifting, driving, computer work.   [] (36982) Gait Re-education- Provided training and instruction to the patient for proper LE, core and proximal hip recruitment and positioning and eccentric body weight control with ambulation re-education including up and down stairs     Home Management Training / Self Care: [] (88482) Provided self-care/home management training related to activities of daily living and compensatory training, and/or use of adaptive equipment for improvement with: ADLs and compensatory training, meal preparation, safety procedures and instruction in use of adaptive equipment, including bathing, grooming, dressing, personal hygiene, basic household cleaning and chores. Home Exercise Program:    [] (80710) Reviewed/Progressed HEP activities related to strengthening, flexibility, endurance, ROM of   [] LE / Lumbar: core, proximal hip and LE for functional self-care, mobility, lifting and ambulation/stair navigation   [] UE / Cervical: cervical, postural, scapular, scapulothoracic and UE control with self care, reaching, carrying, lifting, house/yardwork, driving, computer work  [] (92312)Reviewed/Progressed HEP activities related to improving balance, coordination, kinesthetic sense, posture, motor skill, proprioception of   [] LE: core, proximal hip and LE for self care, mobility, lifting, and ambulation/stair navigation    [] UE / Cervical: cervical, postural,  scapular, scapulothoracic and UE control with self care, reaching, carrying, lifting, house/yardwork, driving, computer work    Manual Treatments:  PROM / STM / Oscillations-Mobs:  G-I, II, III, IV (PA's, Inf., Post.)  [x] (63012) Provided manual therapy to mobilize LE, proximal hip and/or LS spine soft tissue/joints for the purpose of modulating pain, promoting relaxation,  increasing ROM, reducing/eliminating soft tissue swelling/inflammation/restriction, improving soft tissue extensibility and allowing for proper ROM for normal function with   [] LE / lumbar: self care, mobility, lifting and ambulation. [x] UE / Cervical: self care, reaching, carrying, lifting, house/yardwork, driving, computer work.      Modalities: [] (22867) Vasopneumatic compression: Utilized vasopneumatic compression to decrease edema / swelling for the purpose of improving mobility and quad tone / recruitment which will allow for increased overall function including but not limited to self-care, transfers, ambulation, and ascending / descending stairs. Modalities:      Charges:  Timed Code Treatment Minutes: 33   Total Treatment Minutes: 33     [] EVAL - LOW (38666)   [] EVAL - MOD (67838)  [] EVAL - HIGH (79218)  [] RE-EVAL (53667)  [] DR(84655) x        [] Ionto  [] NMR (19625) x     [] Vaso  [] Manual (43652) x       [] Ultrasound  [x] TA x  2      [] Mech Traction (08795)  [] Aquatic Therapy x      [] ES (un) (37739):   [] Home Management Training x  [] ES(attended) (58002)   [] Dry Needling 1-2 muscles (88931):  [] Dry Needling 3+ muscles (507159)  [] Group:      [] Other:     GOALS:   Patient stated goal: to improve dizziness with bending over   []? Progressing: []? Met: []? Not Met: []? Adjusted     Therapist goals for Patient:   Short Term Goals: To be achieved in: 2 weeks  1. Independent in HEP and progression per patient tolerance, in order to prevent re-injury. []? Progressing: []? Met: []? Not Met: []? Adjusted  2. Patient will have a decrease in dizziness/imbalance/symptoms  to facilitate improvement in movement, function, balance and ADLs as indicated by Functional Deficits. []? Progressing: []? Met: []? Not Met: []? Adjusted     Long Term Goals: To be achieved in: 6 weeks  1. Disability index score of 25% or less for the [address] [place] [address] to assist with reaching prior level of function. []? Progressing: []? Met: []? Not Met: []? Adjusted  2. Patient will demonstrate increased cervical AROM to WNL, joint mobility WNL to allow for proper joint functioning as indicated by patients Functional Deficits. []? Progressing: []? Met: []? Not Met: []?  Adjusted Plan for next treatment session: habituation exercises as tolerated, manual for capital/cervial mobility and stretches    PLAN: See eval. PT 2x / week for 6 weeks. [x] Continue per plan of care [] Alter current plan (see comments)  [] Plan of care initiated [] Hold pending MD visit [] Discharge    Electronically signed by: Dipak Arellano PT, DPT,     Note: If patient does not return for scheduled/ recommended follow up visits, this note will serve as a discharge from care along with most recent update on progress.

## 2021-02-16 ENCOUNTER — APPOINTMENT (OUTPATIENT)
Dept: PHYSICAL THERAPY | Age: 66
End: 2021-02-16
Payer: MEDICARE

## 2021-02-18 ENCOUNTER — HOSPITAL ENCOUNTER (OUTPATIENT)
Dept: PHYSICAL THERAPY | Age: 66
Setting detail: THERAPIES SERIES
Discharge: HOME | End: 2021-02-18
Payer: MEDICARE

## 2021-02-18 NOTE — PROGRESS NOTES
Physical Therapy  Cancellation/No-show Note  Patient Name:  Nazanin Ortiz  :  1955   Date:  2021  Cancelled visits to date: 1  No-shows to date: 0    Patient status for today's appointment patient:  [x]  Cancelled 21  []  Rescheduled appointment  []  No-show     Reason given by patient:  []  Patient ill  []  Conflicting appointment  []  No transportation    []  Conflict with work  []  No reason given  [x]  Other:  weather     Comments:      Phone call information:   []  Phone call made today to patient at _ time at number provided:      []  Patient answered, conversation as follows:    []  Patient did not answer, message left as follows:  []  Phone call not made today    Electronically signed by:  Catrachita Rain PT

## 2021-02-23 ENCOUNTER — HOSPITAL ENCOUNTER (OUTPATIENT)
Dept: PHYSICAL THERAPY | Age: 66
Setting detail: THERAPIES SERIES
Discharge: HOME | End: 2021-02-23
Payer: MEDICARE

## 2021-02-23 PROCEDURE — 97530 THERAPEUTIC ACTIVITIES: CPT

## 2021-02-23 PROCEDURE — 97112 NEUROMUSCULAR REEDUCATION: CPT

## 2021-02-23 NOTE — FLOWSHEET NOTE
1/12 - C1-2 PA pressure increases dizziness     RESTRICTIONS/PRECAUTIONS:     Exercises/Interventions:     Therapeutic Exercises (06729) Resistance / level Sets/sec Reps Notes   Treadmill warmup   5 min  2/23 - comfortable pace; TA this date   Cervical Tucks - supine    1/12 - added; follows manual C1-C2 distraction   Seated [employer] Sideglide R/L    1/12 - added; patient cued with TC at C2 TVP                                              Therapeutic Activities (16274)                                          Neuromuscular Re-ed (08012)       VOR vert & Horiz  2ft &t   2x10 1/14 pressure on the left side back of head ; seemed like something trying to connect on the R  2/23 - checkerboard pattern utilized this date   Smooth Pursuit  (Vert & Horiz)  Saccadic  (Vert & Horiz)  VOR x2  Head and eyes follow   10  10   1/28/21   Folding over    Habituation in -supine to sit  Supine to sit turning left     Looking Up w/ posterior lean (tall ceiling)     10x    10x Mild dizziness             2/23 - added   VOR Cancellation - vert & Horiz - standing   10x ea 1/5 pressure in the head tracking to left and vertical  2/23 - pressure behind R ear   Airex:  Head nods/turns EO/EC     1/7 Added   Cone  (5), one set placement bwd   4x2/23 - 4 conesTurn Twist     12/31 LOB at the end   Quarter turns    1/12 - dizziness to Left    Sways A/P, M/L  EO/EC           Manual Intervention (30038)       [employer] (-) however dizziness toward the left side   Roll Test       R Epley  performed       SOR, MET       C1-C2 Distraction R/L G2-3  1/12 - added   C/S Traction G1-3              Pt. Education:  -patient educated on diagnosis, prognosis and expectations for rehab  -all patient questions were answered    Home Exercise Program:  Access Code: [email or web address]   URL: [email or web address]   Date: 12/29/2020   Prepared by: Lilo Andrade Supine to Left Side Lying Vestibular Habituation - 5-10 reps - 1 sets - 5 hold - 1x daily - 7x weekly   Supine to Long Sitting Vestibular Habituation - 5-10 reps - 1 sets - 1x daily - 7x weekly     Therapeutic Exercise and NMR EXR  [] (36370) Provided verbal/tactile cueing for activities related to strengthening, flexibility, endurance, ROM for improvements in  [] LE / Lumbar: LE, proximal hip, and core control with self care, mobility, lifting, ambulation. [] UE / Cervical: cervical, postural, scapular, scapulothoracic and UE control with self care, reaching, carrying, lifting, house/yardwork, driving, computer work. [x] (57637) Provided verbal/tactile cueing for activities related to improving balance, coordination, kinesthetic sense, posture, motor skill, proprioception to assist with   [] LE / lumbar: LE, proximal hip, and core control in self care, mobility, lifting, ambulation and eccentric single leg control. [x] UE / cervical: cervical, scapular, scapulothoracic and UE control with self care, reaching, carrying, lifting, house/yardwork, driving, computer work.   [] (40637) Therapist is in constant attendance of 2 or more patients providing skilled therapy interventions, but not providing any significant amount of measurable one-on-one time to either patient, for improvements in  [] LE / lumbar: LE, proximal hip, and core control in self care, mobility, lifting, ambulation and eccentric single leg control. [] UE / cervical: cervical, scapular, scapulothoracic and UE control with self care, reaching, carrying, lifting, house/yardwork, driving, computer work.      NMR and Therapeutic Activities:    [x] (86511 or 13078) Provided verbal/tactile cueing for activities related to improving balance, coordination, kinesthetic sense, posture, motor skill, proprioception and motor activation to allow for proper function of   [] LE: / Lumbar core, proximal hip and LE with self care and ADLs [] (67704) Provided manual therapy to mobilize LE, proximal hip and/or LS spine soft tissue/joints for the purpose of modulating pain, promoting relaxation,  increasing ROM, reducing/eliminating soft tissue swelling/inflammation/restriction, improving soft tissue extensibility and allowing for proper ROM for normal function with   [] LE / lumbar: self care, mobility, lifting and ambulation. [] UE / Cervical: self care, reaching, carrying, lifting, house/yardwork, driving, computer work. Modalities:  [] (56386) Vasopneumatic compression: Utilized vasopneumatic compression to decrease edema / swelling for the purpose of improving mobility and quad tone / recruitment which will allow for increased overall function including but not limited to self-care, transfers, ambulation, and ascending / descending stairs. Modalities:      Charges:  Timed Code Treatment Minutes: 43   Total Treatment Minutes: 43     [] EVAL - LOW (06542)   [] EVAL - MOD (48211)  [] EVAL - HIGH (46896)  [] RE-EVAL (24318)  [] GL(80009) x        [] Ionto  [x] NMR (30737) x 2     [] Vaso  [] Manual (46134) x       [] Ultrasound  [x] TA x  1      [] Mech Traction (25264)  [] Aquatic Therapy x      [] ES (un) (94980):   [] Home Management Training x  [] ES(attended) (75818)   [] Dry Needling 1-2 muscles (54654):  [] Dry Needling 3+ muscles (712196)  [] Group:      [] Other:     GOALS:   Patient stated goal: to improve dizziness with bending over   []? Progressing: []? Met: []? Not Met: []? Adjusted     Therapist goals for Patient:   Short Term Goals: To be achieved in: 2 weeks  1. Independent in HEP and progression per patient tolerance, in order to prevent re-injury. []? Progressing: []? Met: []? Not Met: []? Adjusted  2. Patient will have a decrease in dizziness/imbalance/symptoms  to facilitate improvement in movement, function, balance and ADLs as indicated by Functional Deficits. []? Progressing: []? Met: []? Not Met: []?  Adjusted    ASSESSMENT:  Use of checkerboard pattern for VOR and cancellation provided patient increased challenge today, as well as increased symptoms during/after habituation. The patient had nausea with cone /placement, especially backwards. She is tolerating higher levels of difficulty with exercises in therapy, but still experience symptoms - indicating ongoing need for PT services. Treatment/Activity Tolerance:  [x] Patient able to complete tx  [] Patient limited by fatigue  [] Patient limited by pain  [] Patient limited by other medical complications  [] Other:     Prognosis: [x] Good [] Fair  [] Poor    Patient Requires Follow-up: [x] Yes  [] No    Plan for next treatment session: habituation exercises as tolerated, manual for capital/cervial mobility and stretches    PLAN: See eval. PT 2x / week for 6 weeks. [x] Continue per plan of care [] Alter current plan (see comments)  [] Plan of care initiated [] Hold pending MD visit [] Discharge    Electronically signed by: Eileen Riojas PT, DPT, OMT-C    Note: If patient does not return for scheduled/ recommended follow up visits, this note will serve as a discharge from care along with most recent update on progress.

## 2021-02-26 ENCOUNTER — HOSPITAL ENCOUNTER (OUTPATIENT)
Dept: PHYSICAL THERAPY | Age: 66
Setting detail: THERAPIES SERIES
Discharge: HOME | End: 2021-02-26
Payer: MEDICARE

## 2021-02-26 PROCEDURE — 97530 THERAPEUTIC ACTIVITIES: CPT

## 2021-02-26 PROCEDURE — 97112 NEUROMUSCULAR REEDUCATION: CPT

## 2021-02-26 NOTE — FLOWSHEET NOTE
Smooth Pursuit (V):              []? Normal       [x]? Abnormal    Comments: disconjugate     Saccades (H):                        []? Normal       [x]? Abnormal    Comments: latent     Saccades (V):                        []? Normal       [x]? Abnormal    Comments: latent     Convergence:                        [x]? Normal       []? Abnormal    Comments:      Head Thrust Test:                 []? Normal       []? Abnormal    Comments:      VOR Cancellation (H):          [x]? Normal       []? Abnormal    Comments:Dizziness      VOR Cancellation (V):          [x]? Normal       []? Abnormal    Comments: Dizziness , pressure on the right side of the neck      VOR (V):                                 [x]? Normal       []? Abnormal    Comments: mild dizziness , pressure in the back of the head      VOR (H):                                 [x]? Normal       []? Abnormal    Comments: mild dizziness        Positional Testing:    R Hallpike-Brielle maneuver:               Nystagmus:     []? Yes             [x]? No               []? Duration:                                          []? Direction:                  Vertigo:            []? Yes             [x]? No               []? Duration:      L Hallpike-Eitan maneuver:              Nystagmus:     []? Yes             [x]? No               []? Duration:    Dizziness and head pressure                                       []? Direction:                  Vertigo:            []? Yes             [x]? No               []? Duration:      Supine roll head right: NT              Nystagmus:     []? Yes             []? No               []? Duration:                                          []? Direction:                  Vertigo:            []? Yes             []? No               []? Duration:     Supine roll head left: NT              Nystagmus:     []?  Yes             []? No               []? Duration:                                          []? Direction: Vertigo:            []? Yes             []? No               []? Duration:         1/12 - C1-2 PA pressure increases dizziness     RESTRICTIONS/PRECAUTIONS:     Exercises/Interventions:     Therapeutic Exercises (47398) Resistance / level Sets/sec Reps Notes   Treadmill warmup  1. 8mph  2% grade 5 min    2/26 - increased grade; TA this date with emphasis on vestibular stimulation and cardio   Cervical Tucks - supine    1/12 - added; follows manual C1-C2 distraction   Seated [employer] Sideglide R/L    1/12 - added; patient cued with TC at C2 TVP                                              Therapeutic Activities (27313)       Gaze Stabilization (checkerboard) + Gait w/ VOR vert/horizontal  40ft x 2 ea 2/26 - added; CGA   Gait + Head Turns/Pitches (no stabilization) vert/horizontal  40 ft x 2 ea 2/26 - added; CGA   Curtsey Lunge (1/4) + Diagonal Head/Eye Movement (frontal plane)   10x R/L ea 2/26 - added; table support nearby                 Neuromuscular Re-ed (99535)       VOR vert & Horiz  2ft & 6ft  1 min, ea  1/14 pressure on the left side back of head ; seemed like something trying to connect on the R    2/26 - checkerboard, switched to time   Smooth Pursuit  (Vert & Horiz)  Saccadic  (Vert & Horiz)  VOR x2  Head and eyes follow   Folding over (w/ 15# weight):  w/o stabilization  w/ stabilization    Habituation in -supine to sit  Supine to sit turning left     Looking Up w/ posterior lean (tall ceiling)       1 x 10  1 x 10       2/26 - added weight & stabiliz.                VOR Cancellation - vert & Horiz - standing   15x ea   2/26 - checkerboard, increased reps; diff w/ vertical, sway with horizontal   [employer]ire[employer]:  Head nods/turns EO/EC     1/7 Added   [facility]  (5), one set placement bwd   Turn Twist     12/31 LOB at the end   Quarter turns    1/12 - dizziness to Left    Sways A/P, M/L  EO/EC                  Manual Intervention (60981)       [employer] (-) however dizziness toward the left side [] UE / cervical: cervical, scapular, scapulothoracic and UE control with self care, reaching, carrying, lifting, house/yardwork, driving, computer work. NMR and Therapeutic Activities:    [x] (27856 or 62523) Provided verbal/tactile cueing for activities related to improving balance, coordination, kinesthetic sense, posture, motor skill, proprioception and motor activation to allow for proper function of   [] LE: / Lumbar core, proximal hip and LE with self care and ADLs  [x] UE / Cervical: cervical, postural, scapular, scapulothoracic and UE control with self care, carrying, lifting, driving, computer work.   [] (49035) Gait Re-education- Provided training and instruction to the patient for proper LE, core and proximal hip recruitment and positioning and eccentric body weight control with ambulation re-education including up and down stairs     Home Management Training / Self Care:  [] (67576) Provided self-care/home management training related to activities of daily living and compensatory training, and/or use of adaptive equipment for improvement with: ADLs and compensatory training, meal preparation, safety procedures and instruction in use of adaptive equipment, including bathing, grooming, dressing, personal hygiene, basic household cleaning and chores.      Home Exercise Program:    [] (64194) Reviewed/Progressed HEP activities related to strengthening, flexibility, endurance, ROM of   [] LE / Lumbar: core, proximal hip and LE for functional self-care, mobility, lifting and ambulation/stair navigation   [] UE / Cervical: cervical, postural, scapular, scapulothoracic and UE control with self care, reaching, carrying, lifting, house/yardwork, driving, computer work  [] (98170)Reviewed/Progressed HEP activities related to improving balance, coordination, kinesthetic sense, posture, motor skill, proprioception of [] LE: core, proximal hip and LE for self care, mobility, lifting, and ambulation/stair navigation    [] UE / Cervical: cervical, postural,  scapular, scapulothoracic and UE control with self care, reaching, carrying, lifting, house/yardwork, driving, computer work    Manual Treatments:  PROM / STM / Oscillations-Mobs:  G-I, II, III, IV (PA's, Inf., Post.)  [] (16343) Provided manual therapy to mobilize LE, proximal hip and/or LS spine soft tissue/joints for the purpose of modulating pain, promoting relaxation,  increasing ROM, reducing/eliminating soft tissue swelling/inflammation/restriction, improving soft tissue extensibility and allowing for proper ROM for normal function with   [] LE / lumbar: self care, mobility, lifting and ambulation. [] UE / Cervical: self care, reaching, carrying, lifting, house/yardwork, driving, computer work. Modalities:  [] (33798) Vasopneumatic compression: Utilized vasopneumatic compression to decrease edema / swelling for the purpose of improving mobility and quad tone / recruitment which will allow for increased overall function including but not limited to self-care, transfers, ambulation, and ascending / descending stairs. Modalities:      Charges:  Timed Code Treatment Minutes: 50   Total Treatment Minutes: 50     [] EVAL - LOW (77224)   [] EVAL - MOD (20287)  [] EVAL - HIGH (20983)  [] RE-EVAL (61840)  [] OE(51119) x        [] Ionto  [x] NMR (03385) x 1     [] Vaso  [] Manual (15796) x       [] Ultrasound  [x] TA x  2      [] Mech Traction (77637)  [] Aquatic Therapy x      [] ES (un) (15109):   [] Home Management Training x  [] ES(attended) (11303)   [] Dry Needling 1-2 muscles (07770):  [] Dry Needling 3+ muscles (971859)  [] Group:      [] Other:     GOALS:   Patient stated goal: to improve dizziness with bending over   [x]? Progressing: []? Met: []? Not Met: []? Adjusted     Therapist goals for Patient:   Short Term Goals:  To be achieved in: 2 weeks 1. Independent in HEP and progression per patient tolerance, in order to prevent re-injury. []? Progressing: [x]? Met: []? Not Met: []? Adjusted  2. Patient will have a decrease in dizziness/imbalance/symptoms  to facilitate improvement in movement, function, balance and ADLs as indicated by Functional Deficits. [x]? Progressing: []? Met: []? Not Met: []? Adjusted     Long Term Goals: To be achieved in: 6 weeks  1. Disability index score of 25% or less for the [address] [place] [address] to assist with reaching prior level of function. []? Progressing: []? Met: []? Not Met: []? Adjusted  2. Patient will demonstrate increased cervical AROM to WNL, joint mobility WNL to allow for proper joint functioning as indicated by patients Functional Deficits. [x]? Progressing: []? Met: []? Not Met: []? Adjusted  3. Patient will demonstrate negative oculomotor special testing/positional testing as indicated by patients Functional Deficits. [x]? Progressing: []? Met: []? Not Met: []? Adjusted  4. Patient will return to functional activities including bending without increased symptoms or restriction. [x]? Progressing: []? Met: []? Not Met: []? Adjusted  5. Patient tolerate rolling to the left in bed without increase symptoms     [x]? Progressing: []? Met: []? Not Met: []? Adjusted         Overall Progression Towards Functional goals/ Treatment Progress Update:  [] Patient is progressing as expected towards functional goals listed. [x] Progression is slowed due to complexities/Impairments listed. [] Progression has been slowed due to co-morbidities.   [] Plan just implemented, too soon to assess goals progression <30days   [] Goals require adjustment due to lack of progress  [] Patient is not progressing as expected and requires additional follow up with physician  [] Other    Persisting Functional Limitations/Impairments:  []Sleeping []Sitting               []Standing []Transfers        []Walking []Kneeling []Stairs []Squatting / bending   []ADLs []Reaching  []Lifting  []Housework  []Driving []Job related tasks  []Sports/Recreation [x]Other: bending, rolling over to the left in bed      ASSESSMENT:  Patient demos improved \"fold over\" and lifting movements when stabilization on target used, which should reduce dizziness/issues when picking up infant grandson from floor. The patient had imbalance with gait + head turns, marked by deviation from midline and requiring CGA. The patient experienced tightness/tension in R cervical musculature with head movements, but this eased slightly through session, and reported less overall limitation of cervical spine with each exercise by end of session. Treatment/Activity Tolerance:  [x] Patient able to complete tx  [] Patient limited by fatigue  [] Patient limited by pain  [] Patient limited by other medical complications  [] Other:     Prognosis: [x] Good [] Fair  [] Poor    Patient Requires Follow-up: [x] Yes  [] No     Plan for next treatment session: habituation exercises as tolerated, manual for capital/cervical mobility and stretches, gait + stabilization, gait + head turns/pitches; prepare for DC. PLAN: See eval. PT 2x / week for 6 weeks. [x] Continue per plan of care [] Alter current plan (see comments)  [] Plan of care initiated [] Hold pending MD visit [] Discharge    Electronically signed by: Wright Brittle PT, DPT, OMT-C    Note: If patient does not return for scheduled/ recommended follow up visits, this note will serve as a discharge from care along with most recent update on progress.

## 2021-03-02 ENCOUNTER — HOSPITAL ENCOUNTER (OUTPATIENT)
Dept: PHYSICAL THERAPY | Age: 66
Setting detail: THERAPIES SERIES
Discharge: HOME | End: 2021-03-02
Payer: MEDICARE

## 2021-03-02 PROCEDURE — 97112 NEUROMUSCULAR REEDUCATION: CPT

## 2021-03-02 PROCEDURE — 97530 THERAPEUTIC ACTIVITIES: CPT

## 2021-03-02 NOTE — FLOWSHEET NOTE
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  · Treatment Diagnosis: vestibular hypofunction and imbalance  Insurance/Certification information:  PT Insurance Information: Medicare  Physician Information:  Referring Practitioner: Daniel Ruffin MD  Plan of care signed (Y/N): [x]  Yes []  No     Date of Patient follow up with Physician: TBD      Progress Report: []  Yes  [x]  No     Date Range for reporting period:  Beginnin20  Ending:    Progress report due (10 Rx/or 30 days whichever is less): visit #3 (of 2nd approved set)       Recertification due (POC duration/ or 90 days whichever is less): visit #3/29/21    Visit # Insurance Allowable Auth required? 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 Like last visit, the patient reports she had her typical symptoms when waking this morning but, \"today is a good day. \" She denies any other pronounced symptoms this date. The patient reports some shortness of breath with multiple trips up and down stairs on laundry day. She reports she feels dizziness when bending down to  her grandson and notes this increases the most when returning to standing upright. Slight R cervical musculature tightness this a.m.      OBJECTIVE:  21 Smooth Pursuit (H):              [x]? Normal       [x]? Abnormal    Comments: disconjugate     Smooth Pursuit (V):              []? Normal       [x]? Abnormal    Comments: disconjugate     Saccades (H):                        []? Normal       [x]? Abnormal    Comments: latent     Saccades (V):                        []? Normal       [x]? Abnormal    Comments: latent     Convergence:                        [x]? Normal       []? Abnormal    Comments:      Head Thrust Test:                 []? Normal       []? Abnormal    Comments:      VOR Cancellation (H):          [x]? Normal       []? Abnormal    Comments:Dizziness      VOR Cancellation (V):          [x]? Normal       []? Abnormal    Comments: Dizziness , pressure on the right side of the neck      VOR (V):                                 [x]? Normal       []? Abnormal    Comments: mild dizziness , pressure in the back of the head      VOR (H):                                 [x]? Normal       []? Abnormal    Comments: mild dizziness        Positional Testing:    R Hallpike-[place] maneuver:               Nystagmus:     []? Yes             [x]? No               []? Duration:                                          []? Direction:                  Vertigo:            []? Yes             [x]? No               []? Duration:      L Hallpike-[place] maneuver:              Nystagmus:     []? Yes             [x]? No               []? Duration:    Dizziness and head pressure                                       []? Direction:                  Vertigo:            []? Yes             [x]? No               []? Duration:      Supine roll head right: NT              Nystagmus:     []? Yes             []? No               []? Duration:                                          []? Direction:                  Vertigo:            []?  Yes             []? No               []? Duration:     Supine roll head left: NT Nystagmus:     []? Yes             []? No               []? Duration:                                          []? Direction:                  Vertigo:            []? Yes             []? No               []? Duration:         1/12 - C1-2 PA pressure increases dizziness     RESTRICTIONS/PRECAUTIONS:     Exercises/Interventions:     Therapeutic Exercises (40828) Resistance / level Sets/sec Reps Notes   Treadmill warmup  1. 5mph  2% grade 5 min    2/26 - increased grade; TA this date with emphasis on vestibular stimulation and cardio   Cervical Tucks - supine    1/12 - added; follows manual C1-C2 distraction   Seated [employer] Sideglide R/L    1/12 - added; patient cued with TC at C2 TVP                                              Therapeutic Activities (17761)       Gaze Stabilization (checkerboard) + Gait w/ VOR vert/horizontal  40ft x 2 ea 2/26 - added; CGA   Gait + Head Turns/Pitches (no stabilization) vert/horizontal  40 ft x 2 ea 2/26 - added; CGA   Curtsey Lunge (1/4) + Diagonal Head/Eye Movement (frontal plane)   10x R/L ea  3/1 off balance @ end 2/26 - added; table support nearby                 Neuromuscular Re-ed (16075)       VOR vert & Horiz  2ft & 6ft  1 min, ea  1/14 pressure on the left side back of head ; seemed like something trying to connect on the R    2/26 - checkerboard, switched to time   Smooth Pursuit  (Vert & Horiz)  Saccadic  (Vert & Horiz)  VOR x2  Head and eyes follow   Folding over (w/ 15# weight):  w/o stabilization  w/ stabilization    Habituation in -supine to sit  Supine to sit turning left     Looking Up w/ posterior lean (tall ceiling)       1 x 10  1 x 10      3/1  2/26 - added weight & stabiliz.                VOR Cancellation - vert & Horiz - standing   15x ea   2/26 - checkerboard, increased reps; diff w/ vertical, sway with horizontal   Airex:  Head nods/turns EO/EC     1/7 Added   [facility]  (5), one set placement bwd   Turn Twist     12/31 LOB at the end Quarter turns    1/12 - dizziness to Left    Sways A/P, M/L  EO/EC                  Manual Intervention (06024)       [employer] (-) however dizziness toward the left side   Roll Test       R Epley  performed       SOR, MET       C1-C2 Distraction R/L G2-3  1/12 - added   C/S Traction G1-3              Pt. Education:  -patient educated on diagnosis, prognosis and expectations for rehab  -all patient questions were answered    Home Exercise Program:  Access Code: [email or web address]   URL: [employer]/   Date: 12/29/2020   Prepared by: Ehsan Martines     Exercises   Supine to Left Side Lying Vestibular Habituation - 5-10 reps - 1 sets - 5 hold - 1x daily - 7x weekly   Supine to Long Sitting Vestibular Habituation - 5-10 reps - 1 sets - 1x daily - 7x weekly     Therapeutic Exercise and NMR EXR  [] (43018) Provided verbal/tactile cueing for activities related to strengthening, flexibility, endurance, ROM for improvements in  [] LE / Lumbar: LE, proximal hip, and core control with self care, mobility, lifting, ambulation. [] UE / Cervical: cervical, postural, scapular, scapulothoracic and UE control with self care, reaching, carrying, lifting, house/yardwork, driving, computer work. [x] (91574) Provided verbal/tactile cueing for activities related to improving balance, coordination, kinesthetic sense, posture, motor skill, proprioception to assist with   [] LE / lumbar: LE, proximal hip, and core control in self care, mobility, lifting, ambulation and eccentric single leg control.    [x] UE / cervical: cervical, scapular, scapulothoracic and UE control with self care, reaching, carrying, lifting, house/yardwork, driving, computer work.   [] (03544) Therapist is in constant attendance of 2 or more patients providing skilled therapy interventions, but not providing any significant amount of measurable one-on-one time to either patient, for improvements in [] LE / lumbar: LE, proximal hip, and core control in self care, mobility, lifting, ambulation and eccentric single leg control. [] UE / cervical: cervical, scapular, scapulothoracic and UE control with self care, reaching, carrying, lifting, house/yardwork, driving, computer work. NMR and Therapeutic Activities:    [x] (52673 or 62231) Provided verbal/tactile cueing for activities related to improving balance, coordination, kinesthetic sense, posture, motor skill, proprioception and motor activation to allow for proper function of   [] LE: / Lumbar core, proximal hip and LE with self care and ADLs  [x] UE / Cervical: cervical, postural, scapular, scapulothoracic and UE control with self care, carrying, lifting, driving, computer work.   [] (23912) Gait Re-education- Provided training and instruction to the patient for proper LE, core and proximal hip recruitment and positioning and eccentric body weight control with ambulation re-education including up and down stairs     Home Management Training / Self Care:  [] (56069) Provided self-care/home management training related to activities of daily living and compensatory training, and/or use of adaptive equipment for improvement with: ADLs and compensatory training, meal preparation, safety procedures and instruction in use of adaptive equipment, including bathing, grooming, dressing, personal hygiene, basic household cleaning and chores.      Home Exercise Program:    [] (88881) Reviewed/Progressed HEP activities related to strengthening, flexibility, endurance, ROM of   [] LE / Lumbar: core, proximal hip and LE for functional self-care, mobility, lifting and ambulation/stair navigation   [] UE / Cervical: cervical, postural, scapular, scapulothoracic and UE control with self care, reaching, carrying, lifting, house/yardwork, driving, computer work [] (98055)Reviewed/Progressed HEP activities related to improving balance, coordination, kinesthetic sense, posture, motor skill, proprioception of   [] LE: core, proximal hip and LE for self care, mobility, lifting, and ambulation/stair navigation    [] UE / Cervical: cervical, postural,  scapular, scapulothoracic and UE control with self care, reaching, carrying, lifting, house/yardwork, driving, computer work    Manual Treatments:  PROM / STM / Oscillations-Mobs:  G-I, II, III, IV (PA's, Inf., Post.)  [] (52678) Provided manual therapy to mobilize LE, proximal hip and/or LS spine soft tissue/joints for the purpose of modulating pain, promoting relaxation,  increasing ROM, reducing/eliminating soft tissue swelling/inflammation/restriction, improving soft tissue extensibility and allowing for proper ROM for normal function with   [] LE / lumbar: self care, mobility, lifting and ambulation. [] UE / Cervical: self care, reaching, carrying, lifting, house/yardwork, driving, computer work. Modalities:  [] (93219) Vasopneumatic compression: Utilized vasopneumatic compression to decrease edema / swelling for the purpose of improving mobility and quad tone / recruitment which will allow for increased overall function including but not limited to self-care, transfers, ambulation, and ascending / descending stairs.      Modalities:      Charges:  Timed Code Treatment Minutes: 44   Total Treatment Minutes: 44     [] EVAL - LOW (97442)   [] EVAL - MOD (32539)  [] EVAL - HIGH (12894)  [] RE-EVAL (07210)  [] MJ(95839) x        [] Ionto  [x] NMR (71857) x 1     [] Vaso  [] Manual (99026) x       [] Ultrasound  [x] TA x  2      [] Mech Traction (73066)  [] Aquatic Therapy x      [] ES (un) (57327):   [] Home Management Training x  [] ES(attended) (55933)   [] Dry Needling 1-2 muscles (22834):  [] Dry Needling 3+ muscles (124361)  [] Group:      [] Other:     GOALS: Patient stated goal: to improve dizziness with bending over   [x]? Progressing: []? Met: []? Not Met: []? Adjusted     Therapist goals for Patient:   Short Term Goals: To be achieved in: 2 weeks  1. Independent in HEP and progression per patient tolerance, in order to prevent re-injury. []? Progressing: [x]? Met: []? Not Met: []? Adjusted  2. Patient will have a decrease in dizziness/imbalance/symptoms  to facilitate improvement in movement, function, balance and ADLs as indicated by Functional Deficits. [x]? Progressing: []? Met: []? Not Met: []? Adjusted     Long Term Goals: To be achieved in: 6 weeks  1. Disability index score of 25% or less for the [facility] to assist with reaching prior level of function. []? Progressing: []? Met: []? Not Met: []? Adjusted  2. Patient will demonstrate increased cervical AROM to WNL, joint mobility WNL to allow for proper joint functioning as indicated by patients Functional Deficits. [x]? Progressing: []? Met: []? Not Met: []? Adjusted  3. Patient will demonstrate negative oculomotor special testing/positional testing as indicated by patients Functional Deficits. [x]? Progressing: []? Met: []? Not Met: []? Adjusted  4. Patient will return to functional activities including bending without increased symptoms or restriction. [x]? Progressing: []? Met: []? Not Met: []? Adjusted  5. Patient tolerate rolling to the left in bed without increase symptoms     [x]? Progressing: []? Met: []? Not Met: []? Adjusted         Overall Progression Towards Functional goals/ Treatment Progress Update:  [] Patient is progressing as expected towards functional goals listed. [x] Progression is slowed due to complexities/Impairments listed. [] Progression has been slowed due to co-morbidities.   [] Plan just implemented, too soon to assess goals progression <30days   [] Goals require adjustment due to lack of progress [] Patient is not progressing as expected and requires additional follow up with physician  [] Other    Persisting Functional Limitations/Impairments:  []Sleeping []Sitting               []Standing []Transfers        []Walking []Kneeling               []Stairs []Squatting / bending   []ADLs []Reaching  []Lifting  []Housework  []Driving []Job related tasks  []Sports/Recreation [x]Other: bending, rolling over to the left in bed      ASSESSMENT:  Patient able to continue with habituation activities with only mild dizziness and head pressure particularly with \" fold over\" . She had nausea only because of not eating. Patient demonstrated gait activities without increase symptoms remaining stable throughout. Will assess patient next visit looking at possibly setting up an HEP. Patient demos improved \"fold over\" and lifting movements when stabilization on target used, which should reduce dizziness/issues when picking up infant grandson from floor. The patient had imbalance with gait + head turns, marked by deviation from midline and requiring CGA. The patient experienced tightness/tension in R cervical musculature with head movements, but this eased slightly through session, and reported less overall limitation of cervical spine with each exercise by end of session. Treatment/Activity Tolerance:  [x] Patient able to complete tx  [] Patient limited by fatigue  [] Patient limited by pain  [] Patient limited by other medical complications  [] Other:     Prognosis: [x] Good [] Fair  [] Poor    Patient Requires Follow-up: [x] Yes  [] No     Plan for next treatment session: habituation exercises as tolerated, manual for capital/cervical mobility and stretches, gait + stabilization, gait + head turns/pitches; prepare for DC. PLAN: See rub[facility]. PT 2x / week for 6 weeks.    [x] Continue per plan of care [] Alter current plan (see comments)  [] Plan of care initiated [] Hold pending MD visit [] Discharge Electronically signed by: Alie Figueroa PT, DPT    Note: If patient does not return for scheduled/ recommended follow up visits, this note will serve as a discharge from care along with most recent update on progress.

## 2021-03-04 ENCOUNTER — HOSPITAL ENCOUNTER (OUTPATIENT)
Dept: PHYSICAL THERAPY | Age: 66
Setting detail: THERAPIES SERIES
Discharge: HOME | End: 2021-03-04
Payer: MEDICARE

## 2021-03-04 PROCEDURE — 97140 MANUAL THERAPY 1/> REGIONS: CPT

## 2021-03-04 PROCEDURE — 97112 NEUROMUSCULAR REEDUCATION: CPT

## 2021-03-04 PROCEDURE — 97110 THERAPEUTIC EXERCISES: CPT

## 2021-03-04 PROCEDURE — 97530 THERAPEUTIC ACTIVITIES: CPT

## 2021-12-21 ENCOUNTER — NURSE TRIAGE (OUTPATIENT)
Dept: OTHER | Facility: CLINIC | Age: 66
End: 2021-12-21

## 2021-12-21 ENCOUNTER — HOSPITAL ENCOUNTER (OUTPATIENT)
Age: 66
Setting detail: OBSERVATION
Discharge: HOME | End: 2021-12-23
Attending: INTERNAL MEDICINE | Admitting: INTERNAL MEDICINE
Payer: MEDICARE

## 2021-12-21 ENCOUNTER — APPOINTMENT (OUTPATIENT)
Dept: GENERAL RADIOLOGY | Age: 66
End: 2021-12-21
Payer: MEDICARE

## 2021-12-21 DIAGNOSIS — D64.9 ANEMIA, UNSPECIFIED TYPE: ICD-10-CM

## 2021-12-21 DIAGNOSIS — M54.2 NECK PAIN: ICD-10-CM

## 2021-12-21 DIAGNOSIS — R06.00 DYSPNEA AND RESPIRATORY ABNORMALITIES: Primary | ICD-10-CM

## 2021-12-21 DIAGNOSIS — M54.50 ACUTE LEFT-SIDED LOW BACK PAIN WITHOUT SCIATICA: ICD-10-CM

## 2021-12-21 DIAGNOSIS — R06.89 DYSPNEA AND RESPIRATORY ABNORMALITIES: Primary | ICD-10-CM

## 2021-12-21 PROBLEM — R06.09 EXERTIONAL DYSPNEA: Status: ACTIVE | Noted: 2021-12-21

## 2021-12-21 LAB
A/G RATIO: 1.5 (ref 1.1–2.2)
ALBUMIN SERPL-MCNC: 4.3 G/DL (ref 3.4–5)
ALP BLD-CCNC: 80 U/L (ref 40–129)
ALT SERPL-CCNC: 9 U/L (ref 10–40)
ANION GAP SERPL CALCULATED.3IONS-SCNC: 10 MMOL/L (ref 3–16)
ANISOCYTOSIS: ABNORMAL
AST SERPL-CCNC: 16 U/L (ref 15–37)
BASOPHILS ABSOLUTE: 0 K/UL (ref 0–0.2)
BASOPHILS RELATIVE PERCENT: 0.1 %
BILIRUB SERPL-MCNC: 2.4 MG/DL (ref 0–1)
BUN BLDV-MCNC: 9 MG/DL (ref 7–20)
CALCIUM SERPL-MCNC: 9.2 MG/DL (ref 8.3–10.6)
CHLORIDE BLD-SCNC: 103 MMOL/L (ref 99–110)
CO2: 24 MMOL/L (ref 21–32)
CREAT SERPL-MCNC: 0.7 MG/DL (ref 0.6–1.2)
D DIMER: <200 NG/ML DDU (ref 0–229)
EKG ATRIAL RATE: 70 BPM
EKG DIAGNOSIS: NORMAL
EKG P AXIS: 67 DEGREES
EKG P-R INTERVAL: 158 MS
EKG Q-T INTERVAL: 376 MS
EKG QRS DURATION: 70 MS
EKG QTC CALCULATION (BAZETT): 406 MS
EKG R AXIS: 9 DEGREES
EKG T AXIS: -15 DEGREES
EKG VENTRICULAR RATE: 70 BPM
EOSINOPHILS ABSOLUTE: 0 K/UL (ref 0–0.6)
EOSINOPHILS RELATIVE PERCENT: 0 %
GFR AFRICAN AMERICAN: >60
GFR NON-AFRICAN AMERICAN: >60
GLUCOSE BLD-MCNC: 110 MG/DL (ref 70–99)
HCT VFR BLD CALC: 30.5 % (ref 36–48)
HEMATOLOGY PATH CONSULT: YES
HEMOGLOBIN: 10 G/DL (ref 12–16)
LYMPHOCYTES ABSOLUTE: 1 K/UL (ref 1–5.1)
LYMPHOCYTES RELATIVE PERCENT: 20.3 %
MACROCYTES: ABNORMAL
MCH RBC QN AUTO: 36.3 PG (ref 26–34)
MCHC RBC AUTO-ENTMCNC: 32.7 G/DL (ref 31–36)
MCV RBC AUTO: 111 FL (ref 80–100)
MONOCYTES ABSOLUTE: 0.3 K/UL (ref 0–1.3)
MONOCYTES RELATIVE PERCENT: 6 %
NEUTROPHILS ABSOLUTE: 3.6 K/UL (ref 1.7–7.7)
NEUTROPHILS RELATIVE PERCENT: 73.6 %
PDW BLD-RTO: 19.6 % (ref 12.4–15.4)
PLATELET # BLD: 196 K/UL (ref 135–450)
PLATELET SLIDE REVIEW: ADEQUATE
PMV BLD AUTO: 8.2 FL (ref 5–10.5)
POTASSIUM REFLEX MAGNESIUM: 4.2 MMOL/L (ref 3.5–5.1)
PRO-BNP: 206 PG/ML (ref 0–124)
PROCALCITONIN: 0.03 NG/ML (ref 0–0.15)
RBC # BLD: 2.74 M/UL (ref 4–5.2)
SLIDE REVIEW: ABNORMAL
SODIUM BLD-SCNC: 137 MMOL/L (ref 136–145)
TOTAL PROTEIN: 7.2 G/DL (ref 6.4–8.2)
TROPONIN: <0.01 NG/ML
TROPONIN: <0.01 NG/ML
WBC # BLD: 4.8 K/UL (ref 4–11)

## 2021-12-21 PROCEDURE — 84484 ASSAY OF TROPONIN QUANT: CPT

## 2021-12-21 PROCEDURE — 93005 ELECTROCARDIOGRAM TRACING: CPT | Performed by: PHYSICIAN ASSISTANT

## 2021-12-21 PROCEDURE — G0378 HOSPITAL OBSERVATION PER HR: HCPCS

## 2021-12-21 PROCEDURE — 99283 EMERGENCY DEPT VISIT LOW MDM: CPT

## 2021-12-21 PROCEDURE — 6370000000 HC RX 637 (ALT 250 FOR IP): Performed by: PHYSICIAN ASSISTANT

## 2021-12-21 PROCEDURE — 93010 ELECTROCARDIOGRAM REPORT: CPT | Performed by: INTERNAL MEDICINE

## 2021-12-21 PROCEDURE — 80053 COMPREHEN METABOLIC PANEL: CPT

## 2021-12-21 PROCEDURE — 71045 X-RAY EXAM CHEST 1 VIEW: CPT

## 2021-12-21 PROCEDURE — 6360000002 HC RX W HCPCS: Performed by: INTERNAL MEDICINE

## 2021-12-21 PROCEDURE — 85025 COMPLETE CBC W/AUTO DIFF WBC: CPT

## 2021-12-21 PROCEDURE — 85379 FIBRIN DEGRADATION QUANT: CPT

## 2021-12-21 PROCEDURE — 83880 ASSAY OF NATRIURETIC PEPTIDE: CPT

## 2021-12-21 PROCEDURE — 96372 THER/PROPH/DIAG INJ SC/IM: CPT

## 2021-12-21 PROCEDURE — 2580000003 HC RX 258: Performed by: INTERNAL MEDICINE

## 2021-12-21 PROCEDURE — 84145 PROCALCITONIN (PCT): CPT

## 2021-12-21 RX ORDER — ASPIRIN 81 MG/1
324 TABLET, CHEWABLE ORAL ONCE
Status: COMPLETED | OUTPATIENT
Start: 2021-12-21 | End: 2021-12-21

## 2021-12-21 RX ORDER — SODIUM CHLORIDE 9 MG/ML
25 INJECTION, SOLUTION INTRAVENOUS PRN
Status: DISCONTINUED | OUTPATIENT
Start: 2021-12-21 | End: 2021-12-23 | Stop reason: HOSPADM

## 2021-12-21 RX ORDER — ACETAMINOPHEN 325 MG/1
650 TABLET ORAL EVERY 6 HOURS PRN
Status: DISCONTINUED | OUTPATIENT
Start: 2021-12-21 | End: 2021-12-23 | Stop reason: HOSPADM

## 2021-12-21 RX ORDER — ONDANSETRON 2 MG/ML
4 INJECTION INTRAMUSCULAR; INTRAVENOUS EVERY 6 HOURS PRN
Status: DISCONTINUED | OUTPATIENT
Start: 2021-12-21 | End: 2021-12-23 | Stop reason: HOSPADM

## 2021-12-21 RX ORDER — ONDANSETRON 4 MG/1
4 TABLET, ORALLY DISINTEGRATING ORAL EVERY 8 HOURS PRN
Status: DISCONTINUED | OUTPATIENT
Start: 2021-12-21 | End: 2021-12-23 | Stop reason: HOSPADM

## 2021-12-21 RX ORDER — POLYETHYLENE GLYCOL 3350 17 G/17G
17 POWDER, FOR SOLUTION ORAL DAILY PRN
Status: DISCONTINUED | OUTPATIENT
Start: 2021-12-21 | End: 2021-12-23 | Stop reason: HOSPADM

## 2021-12-21 RX ORDER — SODIUM CHLORIDE 0.9 % (FLUSH) 0.9 %
5-40 SYRINGE (ML) INJECTION PRN
Status: DISCONTINUED | OUTPATIENT
Start: 2021-12-21 | End: 2021-12-23 | Stop reason: HOSPADM

## 2021-12-21 RX ORDER — SODIUM CHLORIDE 0.9 % (FLUSH) 0.9 %
5-40 SYRINGE (ML) INJECTION EVERY 12 HOURS SCHEDULED
Status: DISCONTINUED | OUTPATIENT
Start: 2021-12-21 | End: 2021-12-23 | Stop reason: HOSPADM

## 2021-12-21 RX ORDER — ACETAMINOPHEN 650 MG/1
650 SUPPOSITORY RECTAL EVERY 6 HOURS PRN
Status: DISCONTINUED | OUTPATIENT
Start: 2021-12-21 | End: 2021-12-23 | Stop reason: HOSPADM

## 2021-12-21 RX ADMIN — Medication 10 ML: at 20:28

## 2021-12-21 RX ADMIN — ASPIRIN 81 MG 324 MG: 81 TABLET ORAL at 11:06

## 2021-12-21 RX ADMIN — ENOXAPARIN SODIUM 40 MG: 100 INJECTION SUBCUTANEOUS at 20:28

## 2021-12-21 ASSESSMENT — PAIN SCALES - GENERAL
PAINLEVEL_OUTOF10: 6
PAINLEVEL_OUTOF10: 0

## 2021-12-21 NOTE — ED NOTES
Report given to [facility] RN at bedside. Pt alert and oriented and shows no signs of distress at time of transfer to [facility]. Pt taken to room by Nurse in wheelchair.        Isaías Núñez RN  12/21/21 0170

## 2021-12-21 NOTE — ED PROVIDER NOTES
[address] [facility]        Pt Name: Julio Wilhelm  MRN: [id number]  [place] 1955  Date of evaluation: 12/21/2021  Provider: Cindy Amaya PA-C  PCP: JAILENE Gillespie CNP  Note Started: 11:20 AM EST       LILIAN. I have evaluated this patient. My supervising physician was available for consultation. CHIEF COMPLAINT       Chief Complaint   Patient presents with    Neck Pain     Pt reports neck pain     Shortness of Breath     Pt reports SOB X1 week        HISTORY OF PRESENT ILLNESS   (Location, Timing/Onset, Context/Setting, Quality, Duration, Modifying Factors, Severity, Associated Signs and Symptoms)  Note limiting factors. Chief Complaint: Left low back pain, left shoulder pain, shortness of breath    Julio Wilhelm is a 74 y.o. female who presents to the emergency department with a couple different symptoms. She states 2 days ago she began getting some pain in her left hip and low back. She is later that day she began getting some pain in her left neck that is worse when she moves her neck. Starting some mild pain in her left low back but this is improved. Mostly only has a staff she stands up too quickly. Denies any fall or injury or trauma or difficulty moving her extremities. Secondarily is also complaining of some worsening shortness of breath that she has been getting over the past 1 month. She states is worse if she bends down and tries to stand up quickly or if she is exerting herself. She states last week she did have an episode of chest pain that radiated into her arms lasted for about 10 minutes. Denies any history of hypertension, hyperlipidemia, diabetes, heart disease and denies any history of cardiac work-up. Denies cough, chest pain at this time, fevers, nausea, vomiting abdominal pain, urinary or bowel symptoms or any other symptoms.     Nursing Notes were all reviewed and agreed with or any disagreements were addressed in the HPI. REVIEW OF SYSTEMS    (2-9 systems for level 4, 10 or more for level 5)     Review of Systems    Positives and Pertinent negatives as per HPI. Except as noted above in the ROS, all other systems were reviewed and negative. PAST MEDICAL HISTORY     Past Medical History:   Diagnosis Date    [facility] ([facility] [address]     Palpable purpura ([facility] [address]     Pernicious anemia          SURGICAL HISTORY     Past Surgical History:   Procedure Laterality Date    ABDOMEN SURGERY      Bowel Obstruction w resection    CARDIAC CATHETERIZATION      uc     SECTION      INNER EAR SURGERY           CURRENTMEDICATIONS       Previous Medications    DICLOFENAC (VOLTAREN) 50 MG EC TABLET    Take 1 tablet by mouth 2 times daily    FOLIC ACID (FOLATE) 965 MCG TABLET    Take 1 tablet by mouth daily    TRIAMCINOLONE (NASACORT ALLERGY 24HR) 55 MCG/ACT NASAL INHALER    2 sprays by Each Nostril route daily    VITAMIN B-12 (CYANOCOBALAMIN) 500 MCG TABLET    Take 1 tablet by mouth daily    VITAMIN D (CHOLECALCIFEROL) 25 MCG (1000 UT) TABS TABLET    Take 2 tablets by mouth daily         ALLERGIES     Patient has no known allergies. FAMILYHISTORY     History reviewed. No pertinent family history. SOCIAL HISTORY       Social History     Tobacco Use    Smoking status: Never Smoker    Smokeless tobacco: Never Used   Substance Use Topics    Alcohol use: Not on file    Drug use: Not on file       SCREENINGS             PHYSICAL EXAM    (up to 7 for level 4, 8 or more for level 5)     ED Triage Vitals [21 1032]   BP Temp Temp src Pulse Resp SpO2 Height Weight   (!) 145/88 98.2 °F (36.8 °C) -- 80 18 99 % -- 180 lb 11.2 oz (82 kg)       Physical Exam  Vitals and nursing note reviewed. Constitutional:       Appearance: She is well-developed. She is not diaphoretic. HENT:      Head: Atraumatic. Nose: Nose normal.   Eyes:      General:         Right eye: No discharge.          Left eye: No discharge. Cardiovascular:      Rate and Rhythm: Normal rate and regular rhythm. Heart sounds: No murmur heard. No friction rub. No gallop. Pulmonary:      Effort: Pulmonary effort is normal. No respiratory distress. Breath sounds: No stridor. No wheezing, rhonchi or rales. Abdominal:      General: Bowel sounds are normal. There is no distension. Palpations: Abdomen is soft. There is no mass. Tenderness: There is no abdominal tenderness. There is no guarding or rebound. Hernia: No hernia is present. Musculoskeletal:         General: Tenderness present. No swelling. Normal range of motion. Cervical back: Normal range of motion. Comments: Some tenderness to palpate over the left buttock and left hip along with the left paracervical musculature. No midline tenderness or step-off in the neck or back. Gross full range of motion throughout all 4 extremities. No joint warmth or erythema, rash or edema. Skin:     General: Skin is warm and dry. Findings: No erythema or rash. Neurological:      Mental Status: She is alert and oriented to person, place, and time. Cranial Nerves: No cranial nerve deficit.    Psychiatric:         Behavior: Behavior normal.         DIAGNOSTIC RESULTS   LABS:    Labs Reviewed   CBC WITH AUTO DIFFERENTIAL - Abnormal; Notable for the following components:       Result Value    RBC 2.74 (*)     Hemoglobin 10.0 (*)     Hematocrit 30.5 (*)     .0 (*)     MCH 36.3 (*)     RDW 19.6 (*)     Anisocytosis 1+ (*)     Macrocytes Occasional (*)     All other components within normal limits    Narrative:     Performed at:  [facility] [address], [place], [facility] [address]   Phone [phone]   COMPREHENSIVE METABOLIC PANEL W/ REFLEX TO MG FOR LOW K - Abnormal; Notable for the following components:    Glucose 110 (*)     Total Bilirubin 2.4 (*)     ALT 9 (*)     All other components within normal limits    Narrative:     Performed at:  [facility]  [address],  [place], [address]   Phone [phone]   BRAIN NATRIURETIC PEPTIDE - Abnormal; Notable for the following components:    Pro- (*)     All other components within normal limits    Narrative:     Performed at:  [facility]  [address],  [place], [address]   Phone [phone]   TROPONIN    Narrative:     Performed at:  [facility]  [address],  [place], [address]   Phone [phone]   TROPONIN    Narrative:     Performed at:  [facility]  [address] [facility],  [place], [address]   Phone [phone]   D-DIMER, QUANTITATIVE    Narrative:     Performed at:  [facility]  [address],  [place], [address]   Phone [phone]   PROCALCITONIN       When ordered only abnormal lab results are displayed. All other labs were within normal range or not returned as of this dictation. EKG: When ordered, EKG's are interpreted by the Emergency Department Physician in the absence of a cardiologist.  Please see their note for interpretation of EKG. RADIOLOGY:   Non-plain film images such as CT, Ultrasound and MRI are read by the radiologist. Plain radiographic images are visualized and preliminarily interpreted by the ED Provider with the below findings:        Interpretation per the Radiologist below, if available at the time of this note:    XR CHEST PORTABLE   Final Result   No acute cardiopulmonary disease. NM Cardiac Stress Test Nuclear Imaging    (Results Pending)     No results found.         PROCEDURES   Unless otherwise noted below, none     Procedures    CRITICAL CARE TIME   N/A    CONSULTS:  IP CONSULT TO HOSPITALIST      EMERGENCY DEPARTMENT COURSE and DIFFERENTIAL DIAGNOSIS/MDM:   Vitals:    Vitals:    12/21/21 1032 12/21/21 1304 12/21/21 1416   BP: (!) 145/88 (!) 146/73 (!) 140/69   Pulse: 80 65 81   Resp: 18 18 18   Temp: 98.2 °F (36.8 °C)  98.3 °F (36.8 °C)   TempSrc:   Oral   SpO2: 99% 100% 99%   Weight: 180 lb 11.2 oz (82 kg)         Patient was given the following medications:  Medications   perflutren lipid microspheres (DEFINITY) injection 1.65 mg (has no administration in time range)   regadenoson (LEXISCAN) injection 0.4 mg (has no administration in time range)   sodium chloride flush 0.9 % injection 5-40 mL (has no administration in time range)   sodium chloride flush 0.9 % injection 5-40 mL (has no administration in time range)   0.9 % sodium chloride infusion (has no administration in time range)   enoxaparin (LOVENOX) injection 40 mg (has no administration in time range)   ondansetron (ZOFRAN-ODT) disintegrating tablet 4 mg (has no administration in time range)     Or   ondansetron (ZOFRAN) injection 4 mg (has no administration in time range)   polyethylene glycol (GLYCOLAX) packet 17 g (has no administration in time range)   acetaminophen (TYLENOL) tablet 650 mg (has no administration in time range)     Or   acetaminophen (TYLENOL) suppository 650 mg (has no administration in time range)   aspirin chewable tablet 324 mg (324 mg Oral Given 12/21/21 1106)           Patient present with some left-sided neck pain and left low back pain that was reproducible and worse with movements. No midline tenderness or step-off in the neck or back. More concerning she has had progressively worsening shortness of breath on minimal exertion. Her daughter later presents to the emergency department and states that they are [address] [place] patient was unable to even ambulate down the nunes without having conversational dyspnea after she just walked down the nunes. She has never had a cardiac work-up. She did have an episode of some chest pain a few days ago.   Low suspicion for aortic dissection, pericarditis, myocarditis, pneumonia, pulmonary embolus or other emergent etiology. Given her worsening shortness of breath on minimal exertion concerning for anginal equivalent discussed with hospitalist will admit for further work-up and treatment. Patient was stable to admission. FINAL IMPRESSION      1. Dyspnea and respiratory abnormalities    2. Acute left-sided low back pain without sciatica    3. Neck pain    4. Anemia, unspecified type          DISPOSITION/PLAN   DISPOSITION Admitted 12/21/2021 02:05:09 PM      PATIENT REFERRED TO:  No follow-up provider specified.     DISCHARGE MEDICATIONS:  New Prescriptions    No medications on file       DISCONTINUED MEDICATIONS:  Discontinued Medications    No medications on file              (Please note that portions of this note were completed with a voice recognition program.  Efforts were made to edit the dictations but occasionally words are mis-transcribed.)    Oliver Cordero PA-C (electronically signed)            Oliver Cordero PA-C  12/21/21 7973

## 2021-12-21 NOTE — ACP (ADVANCE CARE PLANNING)
Advanced Care Planning Note.     Purpose of Encounter: Advanced care planning in light of hospitalization  Parties In Attendance: Patient,    Decisional Capacity: Yes  Subjective: Patient  understand that this conversation is to address long term care goal  Objective: Patient admitted to hospital with exertional dyspnea  Goals of Care Determination: Patient pursue CPR or intubation if required no tracheostomy long-term ventilation  Code Status: full code  Time spent on Advanced care Plannin minutes  [address] documented patient's wishes, would like Gay Givens to make medical decisions if unable to make decisions    Juan Manuel Kwong MD  2021 2:08 PM

## 2021-12-21 NOTE — ED NOTES
Bed: 07  Expected date:   Expected time:   Means of arrival:   Comments:  Danuta Varela RN  12/21/21 1300

## 2021-12-21 NOTE — H&P
Medications:  No current facility-administered medications on file prior to encounter. Current Outpatient Medications on File Prior to Encounter   Medication Sig Dispense Refill    vitamin D (CHOLECALCIFEROL) 25 MCG (1000 UT) TABS tablet Take 2 tablets by mouth daily 60 tablet 11    vitamin B-12 (CYANOCOBALAMIN) 500 MCG tablet Take 1 tablet by mouth daily 30 tablet 11    folic acid (FOLATE) 156 MCG tablet Take 1 tablet by mouth daily 30 tablet 11    diclofenac (VOLTAREN) 50 MG EC tablet Take 1 tablet by mouth 2 times daily 30 tablet 0    triamcinolone (NASACORT ALLERGY 24HR) 55 MCG/ACT nasal inhaler 2 sprays by Each Nostril route daily (Patient not taking: Reported on 7/27/2020) 1 Inhaler 1       Allergies:  No Known Allergies     Social History:  Patient Lives at home   reports that she has never smoked. She has never used smokeless tobacco.     Family History:  Cad brother    Physical Exam:  BP (!) 146/73   Pulse 65   Temp 98.2 °F (36.8 °C)   Resp 18   Wt 180 lb 11.2 oz (82 kg)   SpO2 100%   BMI 32.01 kg/m²     General appearance:  Appears comfortable. AAOx3  HEENT: atraumatic, Pupils equal, muscous membranes moist, no masses appreciated  Cardiovascular: Regular rate and rhythm no murmurs appreciated  Respiratory: CTAB no wheezing  Gastrointestinal: Abdomen soft, non-tender, BS+  EXT: trace edema  Neurology: no gross focal deficts  Psychiatry: Appropriate affect.  Not agitated  Skin: Warm, dry, no rashes appreciated    Labs:  CBC:   Lab Results   Component Value Date    WBC 4.8 12/21/2021    RBC 2.74 12/21/2021    RBC 2.67 03/17/2016    HGB 10.0 12/21/2021    HCT 30.5 12/21/2021    .0 12/21/2021    MCH 36.3 12/21/2021    MCHC 32.7 12/21/2021    RDW 19.6 12/21/2021     12/21/2021    MPV 8.2 12/21/2021     BMP:    Lab Results   Component Value Date     12/21/2021    K 4.2 12/21/2021     12/21/2021    CO2 24 12/21/2021    BUN 9 12/21/2021    CREATININE 0.7 12/21/2021 CALCIUM 9.2 12/21/2021    GFRAA >60 12/21/2021    LABGLOM >60 12/21/2021    GLUCOSE 110 12/21/2021     XR CHEST PORTABLE   Final Result   No acute cardiopulmonary disease. Recent imaging reviewed    Problem List  Active Problems:    Exertional dyspnea  Resolved Problems:    * No resolved hospital problems. *        Assessment/Plan:   Exertional dyspnea  - reepeat troponin  - check d dimer  - if above negative stress test in am  - check echo        DVT prophylaxis lovenox  Code status full code        Please note that some part of this chart was generated using Dragon dictation software. Although every effort was made to ensure the accuracy of this automated transcription, some errors in transcription may have occurred inadvertently. If you may need any clarification, please do not hesitate to contact me through [facility].        Liborio Bagley MD    12/21/2021 2:05 PM

## 2021-12-21 NOTE — ED PROVIDER NOTES
EKG reviewed myself dated today 1109 rate 70 sinus rhythm normal EKG     Addison Shell MD  12/21/21 1112

## 2021-12-21 NOTE — TELEPHONE ENCOUNTER
Received call from Ledy Mejia at [facility] with Red Flag Complaint. Subjective: Having left hip/shoulder and neck pain that started Sunday. A couple days prior had a pain like betty horse then it eased off then I felt throb through arms and chest that went away (10-15mins). Denies chest pain since. Current Symptoms: left hip, left shoulder and neck/base of skull pain. (not as bad in hip now, but more in neck/shoulder), when bend over get dizzy. When walking has to stop to catch breath. Onset: 2 days ago; gradual, unchanged    Associated Symptoms: reduced activity, reduced appetite, increased wakefulness    Pain Severity: 8/10; sharp, aching; intermittent, when moving    Temperature: 100.3 by forehead thermometer on Sunday. Does not feel feverish today. What has been tried: Ibuprofen, Tylenol    LMP: NA Pregnant: NA    Recommended disposition: go to ED now. Care advice provided, patient verbalizes understanding; denies any other questions or concerns; instructed to call back for any new or worsening symptoms. Patient/caller proceeding to nearest Emergency Department     Attention Provider: Thank you for allowing me to participate in the care of your patient. The patient was connected to triage in response to information provided to the ECC/PSC. Please do not respond through this encounter as the response is not directed to a shared pool.     Reason for Disposition   Difficulty breathing or unusual sweating (e.g., sweating without exertion)    Protocols used: SHOULDER PAIN-ADULT-[place]

## 2021-12-22 ENCOUNTER — APPOINTMENT (OUTPATIENT)
Dept: ULTRASOUND IMAGING | Age: 66
End: 2021-12-22
Payer: MEDICARE

## 2021-12-22 LAB
A/G RATIO: 1.3 (ref 1.1–2.2)
ALBUMIN SERPL-MCNC: 3.8 G/DL (ref 3.4–5)
ALP BLD-CCNC: 74 U/L (ref 40–129)
ALT SERPL-CCNC: 7 U/L (ref 10–40)
ANION GAP SERPL CALCULATED.3IONS-SCNC: 11 MMOL/L (ref 3–16)
AST SERPL-CCNC: 13 U/L (ref 15–37)
BASOPHILS ABSOLUTE: 0 K/UL (ref 0–0.2)
BASOPHILS RELATIVE PERCENT: 0.1 %
BILIRUB SERPL-MCNC: 2 MG/DL (ref 0–1)
BUN BLDV-MCNC: 9 MG/DL (ref 7–20)
CALCIUM SERPL-MCNC: 8.8 MG/DL (ref 8.3–10.6)
CHLORIDE BLD-SCNC: 104 MMOL/L (ref 99–110)
CO2: 24 MMOL/L (ref 21–32)
CREAT SERPL-MCNC: 0.6 MG/DL (ref 0.6–1.2)
EOSINOPHILS ABSOLUTE: 0 K/UL (ref 0–0.6)
EOSINOPHILS RELATIVE PERCENT: 0 %
FOLATE: 6.21 NG/ML (ref 4.78–24.2)
GFR AFRICAN AMERICAN: >60
GFR NON-AFRICAN AMERICAN: >60
GLUCOSE BLD-MCNC: 100 MG/DL (ref 70–99)
HAPTOGLOBIN: 121 MG/DL (ref 30–200)
HCT VFR BLD CALC: 28 % (ref 36–48)
HEMATOLOGY PATH CONSULT: NORMAL
HEMOGLOBIN: 9.3 G/DL (ref 12–16)
LACTATE DEHYDROGENASE: 560 U/L (ref 100–190)
LV EF: 58 %
LV EF: 72 %
LVEF MODALITY: NORMAL
LVEF MODALITY: NORMAL
LYMPHOCYTES ABSOLUTE: 1.3 K/UL (ref 1–5.1)
LYMPHOCYTES RELATIVE PERCENT: 21.4 %
MCH RBC QN AUTO: 36.2 PG (ref 26–34)
MCHC RBC AUTO-ENTMCNC: 33.2 G/DL (ref 31–36)
MCV RBC AUTO: 109 FL (ref 80–100)
MONOCYTES ABSOLUTE: 0.2 K/UL (ref 0–1.3)
MONOCYTES RELATIVE PERCENT: 4.2 %
NEUTROPHILS ABSOLUTE: 4.4 K/UL (ref 1.7–7.7)
NEUTROPHILS RELATIVE PERCENT: 74.3 %
PDW BLD-RTO: 19.8 % (ref 12.4–15.4)
PLATELET # BLD: 199 K/UL (ref 135–450)
PMV BLD AUTO: 8.4 FL (ref 5–10.5)
POTASSIUM REFLEX MAGNESIUM: 4.2 MMOL/L (ref 3.5–5.1)
RBC # BLD: 2.57 M/UL (ref 4–5.2)
SODIUM BLD-SCNC: 139 MMOL/L (ref 136–145)
TOTAL PROTEIN: 6.7 G/DL (ref 6.4–8.2)
VITAMIN B-12: <150 PG/ML (ref 211–911)
WBC # BLD: 5.9 K/UL (ref 4–11)

## 2021-12-22 PROCEDURE — 85025 COMPLETE CBC W/AUTO DIFF WBC: CPT

## 2021-12-22 PROCEDURE — 83615 LACTATE (LD) (LDH) ENZYME: CPT

## 2021-12-22 PROCEDURE — 78452 HT MUSCLE IMAGE SPECT MULT: CPT

## 2021-12-22 PROCEDURE — 94680 O2 UPTK RST&XERS DIR SIMPLE: CPT

## 2021-12-22 PROCEDURE — 96372 THER/PROPH/DIAG INJ SC/IM: CPT

## 2021-12-22 PROCEDURE — 82746 ASSAY OF FOLIC ACID SERUM: CPT

## 2021-12-22 PROCEDURE — A9502 TC99M TETROFOSMIN: HCPCS | Performed by: INTERNAL MEDICINE

## 2021-12-22 PROCEDURE — 3430000000 HC RX DIAGNOSTIC RADIOPHARMACEUTICAL: Performed by: INTERNAL MEDICINE

## 2021-12-22 PROCEDURE — 83010 ASSAY OF HAPTOGLOBIN QUANT: CPT

## 2021-12-22 PROCEDURE — G0378 HOSPITAL OBSERVATION PER HR: HCPCS

## 2021-12-22 PROCEDURE — 2580000003 HC RX 258: Performed by: INTERNAL MEDICINE

## 2021-12-22 PROCEDURE — 97116 GAIT TRAINING THERAPY: CPT

## 2021-12-22 PROCEDURE — 6360000002 HC RX W HCPCS: Performed by: INTERNAL MEDICINE

## 2021-12-22 PROCEDURE — 97530 THERAPEUTIC ACTIVITIES: CPT

## 2021-12-22 PROCEDURE — 76705 ECHO EXAM OF ABDOMEN: CPT

## 2021-12-22 PROCEDURE — 97161 PT EVAL LOW COMPLEX 20 MIN: CPT

## 2021-12-22 PROCEDURE — 82607 VITAMIN B-12: CPT

## 2021-12-22 PROCEDURE — 96374 THER/PROPH/DIAG INJ IV PUSH: CPT

## 2021-12-22 PROCEDURE — 80053 COMPREHEN METABOLIC PANEL: CPT

## 2021-12-22 PROCEDURE — 93306 TTE W/DOPPLER COMPLETE: CPT

## 2021-12-22 PROCEDURE — 36415 COLL VENOUS BLD VENIPUNCTURE: CPT

## 2021-12-22 PROCEDURE — 93017 CV STRESS TEST TRACING ONLY: CPT | Performed by: INTERNAL MEDICINE

## 2021-12-22 PROCEDURE — 97165 OT EVAL LOW COMPLEX 30 MIN: CPT

## 2021-12-22 RX ORDER — AMINOPHYLLINE DIHYDRATE 25 MG/ML
100 INJECTION, SOLUTION INTRAVENOUS ONCE
Status: COMPLETED | OUTPATIENT
Start: 2021-12-22 | End: 2021-12-22

## 2021-12-22 RX ADMIN — REGADENOSON 0.4 MG: 0.08 INJECTION, SOLUTION INTRAVENOUS at 11:18

## 2021-12-22 RX ADMIN — TETROFOSMIN 30 MILLICURIE: 1.38 INJECTION, POWDER, LYOPHILIZED, FOR SOLUTION INTRAVENOUS at 11:32

## 2021-12-22 RX ADMIN — TETROFOSMIN 10 MILLICURIE: 1.38 INJECTION, POWDER, LYOPHILIZED, FOR SOLUTION INTRAVENOUS at 10:06

## 2021-12-22 RX ADMIN — Medication 10 ML: at 09:30

## 2021-12-22 RX ADMIN — Medication 10 ML: at 20:49

## 2021-12-22 RX ADMIN — AMINOPHYLLINE 100 MG: 25 INJECTION, SOLUTION INTRAVENOUS at 11:32

## 2021-12-22 RX ADMIN — ENOXAPARIN SODIUM 40 MG: 100 INJECTION SUBCUTANEOUS at 20:48

## 2021-12-22 ASSESSMENT — PAIN SCALES - GENERAL
PAINLEVEL_OUTOF10: 8
PAINLEVEL_OUTOF10: 8
PAINLEVEL_OUTOF10: 0
PAINLEVEL_OUTOF10: 8
PAINLEVEL_OUTOF10: 0
PAINLEVEL_OUTOF10: 5
PAINLEVEL_OUTOF10: 0
PAINLEVEL_OUTOF10: 0

## 2021-12-22 ASSESSMENT — PAIN DESCRIPTION - ORIENTATION
ORIENTATION: LEFT

## 2021-12-22 ASSESSMENT — PAIN DESCRIPTION - ONSET: ONSET: GRADUAL

## 2021-12-22 ASSESSMENT — PAIN DESCRIPTION - PROGRESSION: CLINICAL_PROGRESSION: NOT CHANGED

## 2021-12-22 ASSESSMENT — PAIN DESCRIPTION - DESCRIPTORS
DESCRIPTORS: ACHING
DESCRIPTORS: ACHING

## 2021-12-22 ASSESSMENT — PAIN DESCRIPTION - LOCATION
LOCATION: SHOULDER;NECK

## 2021-12-22 ASSESSMENT — PAIN DESCRIPTION - PAIN TYPE
TYPE: ACUTE PAIN
TYPE: ACUTE PAIN

## 2021-12-22 ASSESSMENT — PAIN DESCRIPTION - FREQUENCY: FREQUENCY: INTERMITTENT

## 2021-12-22 NOTE — PLAN OF CARE
Problem: Falls - Risk of:  Goal: Will remain free from falls  Description: Will remain free from falls  12/22/2021 1349 by Lexi Odell RN  Outcome: Ongoing  Note: Medium fall risk per Walter scale. Fall precautions in place, bed alarm engaged. Non-skid footwear on patient, belongings within reach, bed in lowest position. Problem: Pain:  Goal: Pain level will decrease  Description: Pain level will decrease  Outcome: Ongoing  Note: Pain assessed Q4 and PRN. PRN pain medication available. Pt denies pain this AM.  Will continue to monitor.

## 2021-12-22 NOTE — PROGRESS NOTES
Pt. To room 3328 from ED via wheelchair. Pt. VSS, patient denies pain, patient oriented to room. Pt. Updated on POC, denies questions. Pt. Given toiletries, denies further needs. Bed in lowest position, bed alarm activated, call light and bedside table within reach. Will continue to monitor.

## 2021-12-22 NOTE — PROGRESS NOTES
Occupational Therapy   Occupational Therapy Initial Assessment/discharge summary  Date: 2021   Patient Name: Baltazar Logan  MRN: [id number]     : 1955    Date of Service: 2021    Discharge Recommendations:Chantell Albrecht scored a  on the [facility] ADL Inpatient form. At this time, no further OT is recommended upon discharge due to pt presents at functional baseline. Recommend patient returns to prior setting with prior services. OT Equipment Recommendations  Equipment Needed: No    Assessment   Treatment Diagnosis: Neck pain  Prognosis: Good  Decision Making: Low Complexity  OT Education: OT Role;Plan of Care  Patient Education: Eval, discharge recommendations-patient verbalized understanding  REQUIRES OT FOLLOW UP: No  Activity Tolerance  Activity Tolerance: Patient Tolerated treatment well  Safety Devices  Safety Devices in place: Not Applicable (Pt in chair, RN aware, needs in reach)           Patient Diagnosis(es): The primary encounter diagnosis was Dyspnea and respiratory abnormalities. Diagnoses of Acute left-sided low back pain without sciatica, Neck pain, and Anemia, unspecified type were also pertinent to this visit. has a past medical history of Crohn's disease ([address] Palpable purpura ([address] and Pernicious anemia. has a past surgical history that includes  section; Inner ear surgery; Abdomen surgery; and Cardiac catheterization. Treatment Diagnosis: Neck pain      Restrictions  Restrictions/Precautions  Restrictions/Precautions: Fall Risk,Up as Tolerated (moderate fall risk)  Position Activity Restriction  Other position/activity restrictions: 74 y.o. female been having increasing exertional dyspnea over the last couple weeks a month ago patient to walk about 30 feet before shortness of breath now is having exertional dyspnea after 45 feet no chest pain does have some sweats sometimes and palpitations no nausea or vomiting.  Patient denies any fever or chills does have a slight cough no sick contacts.  Patient does not smoke or drink no history of hypertension or diabetes does have a family history of cardiac disease with brother with MI in his 46s    Subjective   General  Chart Reviewed: Yes  Patient assessed for rehabilitation services?: Yes  Family / Caregiver Present: No  Diagnosis: Neck pain  Subjective  Subjective: Patient supine in bed, agreeable to evaluation  Patient Currently in Pain: Yes  Pain Assessment  Pain Assessment: 0-10  Pain Level: 8  Patient's Stated Pain Goal: No pain  Pain Type: Acute pain  Pain Location: Shoulder;Neck  Pain Orientation: Left  Pain Descriptors: Aching  Pre Treatment Pain Screening  Intervention List: Patient able to continue with treatment  Vital Signs  Temp: 98.2 °F (36.8 °C)  Temp Source: Oral  Pulse: 80  Heart Rate Source: Monitor  Resp: 18  BP: 138/89  BP Location: Left upper arm  MAP (mmHg): 105  Patient Position: Semi fowlers  Level of Consciousness: Alert (0)  MEWS Score: 1  Patient Currently in Pain: Yes  Oxygen Therapy  SpO2: 96 %  Pulse Oximeter Device Mode: Intermittent  Pulse Oximeter Device Location: Finger  O2 Device: None (Room air)  Social/Functional History  Social/Functional History  Lives With: Daughter (Lives with daughter,  and grandson)  Type of Home: House  Home Layout: One level,Laundry in basement  Home Access: Stairs to enter with rails  Entrance Stairs - Number of Steps: 2 LUZ ELENA into garage, 1 LUZ ELENA to enter from [employer] Stairs - Rails: Right  Bathroom Shower/Tub: Walk-in shower  Bathroom Toilet: Standard  Bathroom Accessibility: Accessible  ADL Assistance: Independent  Homemaking Assistance: Needs assistance  Homemaking Responsibilities: Yes (Shares responsibilities with daughter and son in-law)  Ambulation Assistance: Independent  Transfer Assistance: Independent  Active : Yes  Mode of Transportation: [facility]  Type of occupation: Owns rental houses  Leisure & Hobbies: play with

## 2021-12-22 NOTE — PROGRESS NOTES
Physical/Occupational Therapy  Chantell Velasquez  Orders received, chart reviewed. Attempted PT/OT evaluation this date. Pt currently off floor. Will re-attempt evaluation later this date as schedule allows.    [address], [address] OTR/L [id number]

## 2021-12-22 NOTE — PROGRESS NOTES
[facility] PROGRESS NOTE    12/22/2021 9:16 AM        Name: Joelle Smith . Admitted: 12/21/2021  Primary Care Provider: JAILENE Fabian CNP (Tel: [phone])      Subjective:    Patient lying in bed still having some shortness of breath no chest pain nausea vomiting or fever    Reviewed interval ancillary notes    Current Medications  perflutren lipid microspheres (DEFINITY) injection 1.65 mg, ONCE PRN  regadenoson (LEXISCAN) injection 0.4 mg, ONCE PRN  sodium chloride flush 0.9 % injection 5-40 mL, 2 times per day  sodium chloride flush 0.9 % injection 5-40 mL, PRN  0.9 % sodium chloride infusion, PRN  enoxaparin (LOVENOX) injection 40 mg, Nightly  ondansetron (ZOFRAN-ODT) disintegrating tablet 4 mg, Q8H PRN   Or  ondansetron (ZOFRAN) injection 4 mg, Q6H PRN  polyethylene glycol (GLYCOLAX) packet 17 g, Daily PRN  acetaminophen (TYLENOL) tablet 650 mg, Q6H PRN   Or  acetaminophen (TYLENOL) suppository 650 mg, Q6H PRN  influenza quadrivalent split vaccine (FLUZONE;FLUARIX;FLULAVAL;AFLURIA) injection 0.5 mL, Prior to discharge        Objective:  /72   Pulse 70   Temp 98.5 °F (36.9 °C) (Oral)   Resp 17   Ht 5' 3\" (1.6 m)   Wt 181 lb 11.2 oz (82.4 kg)   SpO2 97%   BMI 32.19 kg/m²     Intake/Output Summary (Last 24 hours) at 12/22/2021 0916  Last data filed at 12/21/2021 2249  Gross per 24 hour   Intake 240 ml   Output --   Net 240 ml      Wt Readings from Last 3 Encounters:   12/22/21 181 lb 11.2 oz (82.4 kg)   11/17/20 197 lb (89.4 kg)   12/18/19 197 lb (89.4 kg)       General appearance:  Appears comfortable.  AAOx3  HEENT: atraumatic, Pupils equal, muscous membranes moist, no masses appreciated  Cardiovascular: Regular rate and rhythm no murmurs appreciated  Respiratory: CTAB no wheezing  Gastrointestinal: Abdomen soft, non-tender, BS+  EXT: no edema  Neurology: no gross focal deficts  Psychiatry: Appropriate affect. Not agitated  Skin: Warm, dry, no rashes appreciated    Labs and Tests:  CBC:   Recent Labs     12/21/21  1059 12/22/21  0618   WBC 4.8 5.9   HGB 10.0* 9.3*    199     BMP:    Recent Labs     12/21/21  1059 12/22/21  0618    139   K 4.2 4.2    104   CO2 24 24   BUN 9 9   CREATININE 0.7 0.6   GLUCOSE 110* 100*     Hepatic:   Recent Labs     12/21/21  1059 12/22/21  0618   AST 16 13*   ALT 9* 7*   BILITOT 2.4* 2.0*   ALKPHOS 80 74     XR CHEST PORTABLE   Final Result   No acute cardiopulmonary disease. NM Cardiac Stress Test Nuclear Imaging    (Results Pending)   US GALLBLADDER RUQ    (Results Pending)       Recent imaging reviewed    Problem List  Active Problems:    Exertional dyspnea  Resolved Problems:    * No resolved hospital problems.  *       Assessment/Plan:   Exertional dyspnea  - stress test and echo       elevated bili and anemia: check RUQ US b12 folate and ldh, haptoglobin        DVT prophylaxis lovenox  Code status full code      Shayla Copeland MD   12/22/2021 9:16 AM

## 2021-12-22 NOTE — PROGRESS NOTES
12/22/21 1650   Resting (Room Air)   SpO2 98   HR 91   During Walk (Room Air)   SpO2 95   HR 98   Walk/Assistance Device Ambulation   Rate of Dyspnea 1   Symptoms Shortness of breath   After Walk   Does the Patient Qualify for Home O2 No   Does the Patient Need Portable Oxygen Tanks No

## 2021-12-22 NOTE — PROGRESS NOTES
Physical Therapy    Facility/Department: [address] NURSING  Initial Assessment/Discharge Summary    NAME: Gorge Arriola  : 1955  MRN: [id number]    Date of Service: 2021    Discharge Recommendations: Gorge Arriola scored a 24/24 on the AM-PAC short mobility form. At this time, no further PT is recommended upon discharge due to being at or near baseline function. Recommend patient returns to prior setting with prior services. PT Equipment Recommendations  Equipment Needed: No    Assessment   Assessment: Patient presents with L neck and intrascapular pain developing significant tightness of the musculature within the region limiting her cervical AROM. Patient's ability to perform functional mobility and ambulation are at or near baseline deeming further acute care skilled therapy to be non-beneficial at this time. Patient would benefit from OP PT if s/s continue to persist. Patient described her awareness to own deficits and compensates accordingly in order to uphold her safety when moving in and outside of the home. Treatment Diagnosis: Neck pain  Prognosis: Good  Decision Making: Low Complexity  Clinical Presentation: Stable  PT Education: Goals;PT Role;Plan of Care  Patient Education: Patient was additionally educated on discharge recommendations and interventions to combat the tightness surrouding her neck and intrascapular musculature such as the application of heat and sustained pressure as well as stretching to increase musclar flexibility in order to alleviate the pain associated. Barriers to Learning: None  REQUIRES PT FOLLOW UP: No  Activity Tolerance  Activity Tolerance: Patient Tolerated treatment well  Activity Tolerance: Patient tolerated treatment well despite the slight SOB experienced on exertion while ambulating and discomfort associated with muscular palpation and trigger point release.        Patient Diagnosis(es): The primary encounter diagnosis was Dyspnea and respiratory are gradually resolving as her time in the hospital passes. Pain Screening  Patient Currently in Pain: Yes  Pain Assessment  Pain Assessment: 0-10  Pain Level: 8  Pain Location: Shoulder;Neck (Left UT region)  Pain Orientation: Left  Vital Signs  Patient Currently in Pain: Yes--RN aware     Orientation  Orientation  Overall Orientation Status: Within Functional Limits     Social/Functional History  Social/Functional History  Lives With: Daughter (Lives with daughter,  and grandson)  Type of Home: House  Home Layout: One level,Laundry in basement  Home Access: Stairs to enter with rails  Entrance Stairs - Number of Steps: 2 LUZ ELENA into garage, 1 LUZ ELENA to enter from [employer] Stairs - Rails: Right  Bathroom Shower/Tub: Walk-in shower  Bathroom Toilet: Standard  Bathroom Accessibility: Accessible  ADL Assistance: Independent  Homemaking Assistance: Needs assistance  Homemaking Responsibilities: Yes (Shares responsibilities with daughter and son in-law)  Ambulation Assistance: Independent  Transfer Assistance: Independent  Active : Yes  Mode of Transportation: [employer]  Type of occupation: Owns rental houses  Leisure & Hobbies: play with grandchildren, shopping, [employer], vacationing  Additional Comments: Patient denies experiencing any falls in past 3-6 mo. Patient noted having vertigo and receiving PT for treatment ~ 8 mo ago. Cognition   Cognition  Overall Cognitive Status: WFL    Objective  Observation/Palpation  Palpation: Palpation of the L neck paraspinals, levator scapulae, UT, and intrascapular region revealed considerable tightness. Patient was tender to palpation when targetting the levator scapulae. Observation: Observed muscular tightness of the L UT region at rest and with functional mobility.   Edema: Observed a moderate increase in swelling surrounding the L scapular region compared to R.    AROM RLE (degrees)  RLE AROM: WFL  RLE General AROM: Based upon observed functional mobility and ambulation. AROM LLE (degrees)  LLE AROM : WFL  LLE General AROM: Based upon observed functional mobility and ambulation. Spine  Cervical: Cervical AROM revealed to be limited in all directions. Strength RLE  Strength RLE: WFL  Comment: Based upon observed functional mobility and ambulation. Strength LLE  Strength LLE: WFL  Comment: Based upon observed functional mobility and ambulation. Tone RLE  RLE Tone: Normotonic  Tone LLE  LLE Tone: Normotonic  Motor Control  Gross Motor?: WFL  Sensation  Overall Sensation Status: WFL (Patient denies experiencing any N/T in UEs and LEs.)  Bed mobility  Supine to Sit: Independent  Transfers  Sit to Stand: Independent  Stand to sit: Independent  Comment: Patient performs STS transfers with caution, avoiding sudden, quick movements as she reports she has a history of vertigo. Ambulation  Ambulation?: Yes  WB Status: Full WBing  Ambulation 1  Surface: level tile  Device: No Device  Assistance: Independent  Gait Deviations: Slow Mason;Decreased arm swing;Decreased head and trunk rotation  Distance: ~ 200 ft  Comments: Patient ambulated ~ 200 ft independently with a slow mason, minimal head and trunk rotation while also lacking a reciprocal arm swing. Patient's gait was observed as significantly guarded and stiff. Patient seemingly utilized the surfaces surrounding her for support admitting to performing this behavior when at home as well. Stairs/Curb  Stairs?: No     Balance  Sitting - Static: Good  Sitting - Dynamic: Good (independent seated EOB during MMT/ROM assessment and during assessment of neck musculature ~15-20 minutes total)  Standing - Static: Good  Standing - Dynamic: Good      Massage and trigger point release applied to L upper trap, L levator, and scapular musculature with pt reporting decreased pain following application.  Pt educated on self-application of L levator stretch and provided with heat pack and educated on time to keep heat pack on and then to

## 2021-12-23 VITALS
RESPIRATION RATE: 16 BRPM | HEART RATE: 68 BPM | HEIGHT: 63 IN | TEMPERATURE: 97.9 F | DIASTOLIC BLOOD PRESSURE: 67 MMHG | SYSTOLIC BLOOD PRESSURE: 104 MMHG | WEIGHT: 178.8 LBS | BODY MASS INDEX: 31.68 KG/M2 | OXYGEN SATURATION: 99 %

## 2021-12-23 PROCEDURE — 2580000003 HC RX 258: Performed by: INTERNAL MEDICINE

## 2021-12-23 PROCEDURE — 6360000002 HC RX W HCPCS: Performed by: INTERNAL MEDICINE

## 2021-12-23 PROCEDURE — 90686 IIV4 VACC NO PRSV 0.5 ML IM: CPT | Performed by: INTERNAL MEDICINE

## 2021-12-23 PROCEDURE — 99222 1ST HOSP IP/OBS MODERATE 55: CPT | Performed by: INTERNAL MEDICINE

## 2021-12-23 PROCEDURE — 6360000002 HC RX W HCPCS: Performed by: NURSE PRACTITIONER

## 2021-12-23 PROCEDURE — 96372 THER/PROPH/DIAG INJ SC/IM: CPT

## 2021-12-23 PROCEDURE — G0008 ADMIN INFLUENZA VIRUS VAC: HCPCS | Performed by: INTERNAL MEDICINE

## 2021-12-23 PROCEDURE — G0378 HOSPITAL OBSERVATION PER HR: HCPCS

## 2021-12-23 RX ORDER — CYANOCOBALAMIN 1000 UG/ML
1000 INJECTION INTRAMUSCULAR; SUBCUTANEOUS ONCE
Status: COMPLETED | OUTPATIENT
Start: 2021-12-23 | End: 2021-12-23

## 2021-12-23 RX ADMIN — Medication 10 ML: at 10:37

## 2021-12-23 RX ADMIN — CYANOCOBALAMIN 1000 MCG: 1000 INJECTION, SOLUTION INTRAMUSCULAR; SUBCUTANEOUS at 13:47

## 2021-12-23 RX ADMIN — INFLUENZA A VIRUS A/VICTORIA/2570/2019 IVR-215 (H1N1) ANTIGEN (PROPIOLACTONE INACTIVATED), INFLUENZA A VIRUS A/CAMBODIA/E0826360/2020 IVR-224 (H3N2) ANTIGEN (PROPIOLACTONE INACTIVATED), INFLUENZA B VIRUS B/VICTORIA/705/2018 BVR-11 ANTIGEN (PROPIOLACTONE INACTIVATED), INFLUENZA B VIRUS B/PHUKET/3073/2013 BVR-1B ANTIGEN (PROPIOLACTONE INACTIVATED) 0.5 ML: 15; 15; 15; 15 INJECTION, SUSPENSION INTRAMUSCULAR at 14:03

## 2021-12-23 ASSESSMENT — PAIN SCALES - GENERAL
PAINLEVEL_OUTOF10: 0

## 2021-12-23 NOTE — CONSULTS
[facility]   CONSULT NOTE    2021 12:04 PM    Patient Information: Nahum Merino   Date of Admit:  2021  Primary Care Physician:  JAILENE Soriano CNP  Requesting Physician:  Hu Sotelo MD    Reason for consult:   Evaluation and recommendations for anemia    Chief complaint:    Chief Complaint   Patient presents with    Neck Pain     Pt reports neck pain     Shortness of Breath     Pt reports SOB X1 week        History of Present Illness:  Nahum Merino is a 74 y.o. female on Hu Sotelo MD service who was admitted on 2021 with c/o shortness of breath. She reports having had NIELSEN for a while, but it became worse over the past week. Cardiac work up was negative. She was seen by pulmonology who could find no pulmonary cause of her dyspnea    She has a history of pernicious anemia. Patient states she was initially receiving B12 injections by her PCP and was switched to oral B12 about a year ago. Hgb in 2020 was normal at 13.6. On admission hgb was 10.0, today it is 9.3, MCV elevated at 109. Vit B12 level is <150      Past Medical History:     has a past medical history of Crohn's disease ([address] Palpable purpura ([address] and Pernicious anemia. Past Surgical History:    Past Surgical History:   Procedure Laterality Date    ABDOMEN SURGERY      Bowel Obstruction w resection    CARDIAC CATHETERIZATION      uc     SECTION      INNER EAR SURGERY          Current Medications:     sodium chloride flush  5-40 mL IntraVENous 2 times per day    enoxaparin  40 mg SubCUTAneous Nightly    influenza virus vaccine  0.5 mL IntraMUSCular Prior to discharge       Allergies:    No Known Allergies     Social History:    reports that she has never smoked. She has never used smokeless tobacco.     Family History:     family history is not on file.      ROS:    Constitutional:  Negative for fever, chills or night sweats  Eyes:  Negative for exudate, itching  Ears:  Negative for drainage   Nose:  Negative for rhinorrhea, epistaxis  Mouth/Throat:  Negative for hoarseness, sore throat. Respiratory:   Negative for shortness of breath, hemoptysis, wheezing  Cardiovascular: Negative for chest pain, palpitations   Gastrointestinal:  Negative for nausea, vomiting, diarrhea, black stool, bright red blood in the stool  Genitourinary:  Negative for dysuria, hematuria   Hematologic/Lymphatic:  Negative for  bleeding tendency, easy bruising  Musculoskeletal:  Negative for myalgias, arthralgias  Neurologic:  Negative for  confusion,dysarthria. Skin :  Negative for itching, rash  Psychiatric:  Negative for depression, anxiety. Endocrine:  Negative for polydipsia, polyuria, heat /cold intolerance. PHYSICAL EXAM:    Vitals:  Vitals:    12/23/21 0845   BP: 104/67   Pulse: 68   Resp: 16   Temp: 97.9 °F (36.6 °C)   SpO2: 99%        Intake/Output Summary (Last 24 hours) at 12/23/2021 1204  Last data filed at 12/23/2021 1037  Gross per 24 hour   Intake 10 ml   Output --   Net 10 ml      Wt Readings from Last 3 Encounters:   12/23/21 178 lb 12.8 oz (81.1 kg)   11/17/20 197 lb (89.4 kg)   12/18/19 197 lb (89.4 kg)        General appearance: Appears comfortable. Well nourished  Eyes: Sclera clear, pupils equal  ENT: Moist mucus membranes, no thrush  Neck: Trachea midline, symmetrical  Cardiovascular: Regular rhythm, normal S1, S2. No murmur, gallop, rub. No edema in  lower extremities  Respiratory: Clear to auscultation bilaterally. No wheeze. Good inspiratory effort  Gastrointestinal: Abdomen soft, not tender, not distended, normal bowel sounds  Musculoskeletal: No cyanosis in digits, warm extremities  Neurologic: Cranial nerves grossly intact, no motor or speech deficits. Psychiatric: Normal affect. Alert and oriented to time, place and person.   Skin: Warm, dry, normal turgor, no rash    DATA:  CBC:   Lab Results   Component Value Date    WBC 5.9 12/22/2021    RBC 2.57 12/22/2021    RBC 2.67 03/17/2016    HGB 9.3 12/22/2021    HCT 28.0 12/22/2021    .0 12/22/2021    MCH 36.2 12/22/2021    MCHC 33.2 12/22/2021    RDW 19.8 12/22/2021     12/22/2021    MPV 8.4 12/22/2021     BMP:  Lab Results   Component Value Date     12/22/2021    K 4.2 12/22/2021     12/22/2021    CO2 24 12/22/2021    BUN 9 12/22/2021    CREATININE 0.6 12/22/2021    CALCIUM 8.8 12/22/2021    GFRAA >60 12/22/2021    LABGLOM >60 12/22/2021    GLUCOSE 100 12/22/2021     Magnesium:   Lab Results   Component Value Date    MG 2.30 06/10/2016     LIVER PROFILE:   Recent Labs     12/21/21  1059 12/22/21  0618   AST 16 13*   ALT 9* 7*   BILITOT 2.4* 2.0*   ALKPHOS 80 74     PT/INR:    Lab Results   Component Value Date    PROTIME 14.3 05/08/2016    INR 1.25 05/08/2016       IMPRESSION/RECOMMENDATIONS:    Active Problems:    Exertional dyspnea  Resolved Problems:    * No resolved hospital problems. *       Shortness of breath  - cardiac work up negative  - no pulmonary cause for dyspnea per pulmonary    Anemia  - hx of pernicious anemia  - s/p B12 injections, switched to oral B12, patient appears to have been taking 500 mcg/day which may not have been enough. - Vit B12 level on 12/22/21 was <150  - will give one injection of B12 today  - OK for discharge from hematology perspective, patient will f/u with us in one week      This plan was discussed with the patient and he/she verbalized understanding. Thank you for allowing us to participate in the care of this patient. Verena Dinero, BOYD  [facility]        I agree with the above note profound vitamin B12 deficiency. Received 1 dose of IM vitamin B12.   Will require lifelong supplementation by IM route    Ana Laura Franklin MD  [facility]

## 2021-12-23 NOTE — PLAN OF CARE
Problem: Falls - Risk of:  Goal: Will remain free from falls  Description: Will remain free from falls  12/22/2021 2256 by mImanuel Pitts RN  Outcome: Ongoing  12/22/2021 1349 by Juan Salazar RN  Outcome: Ongoing  Note: Medium fall risk per Walter scale. Fall precautions in place, bed alarm engaged. Non-skid footwear on patient, belongings within reach, bed in lowest position. Problem: Cardiac:  Goal: Ability to maintain vital signs within normal range will improve  Description: Ability to maintain vital signs within normal range will improve  Outcome: Ongoing  Goal: Cardiovascular alteration will improve  Description: Cardiovascular alteration will improve  Outcome: Ongoing     Problem: Pain:  Goal: Pain level will decrease  Description: Pain level will decrease  12/22/2021 2256 by Immanuel Pitts RN  Outcome: Ongoing  12/22/2021 1349 by Juan Salazar RN  Outcome: Ongoing  Note: Pain assessed Q4 and PRN. PRN pain medication available. Pt denies pain this AM.  Will continue to monitor.     Goal: Control of acute pain  Description: Control of acute pain  Outcome: Ongoing

## 2021-12-23 NOTE — CONSULTS
[facility]   Consult Note      Reason for Consult: NIELSEN  Requesting Physician: Pancho Coreas    Subjective:   CHIEF COMPLAINT: Shortness of breath with exertion     HPI: Patient states that she has been progressively getting more short of breath for the last year-increasingly worse over the last 1 month. She has to stop to catch her breath after walking few yards each time. Has increased fatigue. Denies any wheezing, cough or chest pain. No prior history of asthma, non-smoker. Cardiac work-up including 2D echo and nuclear stress test have been noted to be negative, hence pulmonary evaluation requested. History of Crohn's disease-currently not on therapy. History of pernicious anemia with chronic anemia, has family history of Wiskott-Govind syndrome.        The patient is a 74 y.o. female with significant past medical history of:      Diagnosis Date    Crohn's disease ([address]     [address]pable pur[address] [address] [facility]     Pernicious anemia         Past Surgical History:        Procedure Laterality Date    ABDOMEN SURGERY      Bowel Obstruction w resection    CARDIAC CATHETERIZATION      uc     SECTION      INNER EAR SURGERY       Current Medications:    Current Facility-Administered Medications: perflutren lipid microspheres (DEFINITY) injection 1.65 mg, 1.5 mL, IntraVENous, ONCE PRN  sodium chloride flush 0.9 % injection 5-40 mL, 5-40 mL, IntraVENous, 2 times per day  sodium chloride flush 0.9 % injection 5-40 mL, 5-40 mL, IntraVENous, PRN  0.9 % sodium chloride infusion, 25 mL, IntraVENous, PRN  enoxaparin (LOVENOX) injection 40 mg, 40 mg, SubCUTAneous, Nightly  ondansetron (ZOFRAN-ODT) disintegrating tablet 4 mg, 4 mg, Oral, Q8H PRN **OR** ondansetron (ZOFRAN) injection 4 mg, 4 mg, IntraVENous, Q6H PRN  polyethylene glycol (GLYCOLAX) packet 17 g, 17 g, Oral, Daily PRN  acetaminophen (TYLENOL) tablet 650 mg, 650 mg, Oral, Q6H PRN **OR** acetaminophen (TYLENOL) suppository 650 mg, 650 mg, Rectal, Q6H PRN  influenza quadrivalent split vaccine (FLUZONE;FLUARIX;FLULAVAL;AFLURIA) injection 0.5 mL, 0.5 mL, IntraMUSCular, Prior to discharge    No Known Allergies    Social History:    TOBACCO:   reports that she has never smoked. She has never used smokeless tobacco.  ETOH:   has no history on file for alcohol use. Patient currently lives independently    Family History:   History reviewed. No pertinent family history. REVIEW OF SYSTEMS:    Constitutional: Fatigue and malaise  Ears, nose, mouth, throat: negative for ear drainage, epistaxis, hoarseness, nasal congestion, sore throat and voice change  Respiratory: negative except for shortness of breath  Cardiovascular: negative for chest pain, chest pressure/discomfort, irregular heart beat, lower extremity edema and palpitations  Gastrointestinal: negative for abdominal pain, constipation, diarrhea, jaundice, melena, odynophagia, reflux symptoms and vomiting  Hematologic/lymphatic: negative for bleeding, easy bruising, lymphadenopathy and petechiae  Musculoskeletal:negative for arthralgias, bone pain, muscle weakness, neck pain and stiff joints  Neurological: negative for dizziness, gait problems, headaches, seizures, speech problems, tremors and weakness  Behavioral/Psych: negative for anxiety, behavior problems, depression, fatigue and sleep disturbance  Endocrine: negative for diabetic symptoms including none, neuropathy, polyphagia, polyuria, polydipsia, vomiting and diarrhea and temperature intolerance  Allergic/Immunologic: negative for anaphylaxis, angioedema, hay fever and urticaria      Objective:     Patient Vitals for the past 8 hrs:   BP Temp Temp src Pulse Resp SpO2 Weight   12/23/21 0845 104/67 97.9 °F (36.6 °C) Oral 68 16 99 % --   12/23/21 0445 -- -- -- -- -- -- 178 lb 12.8 oz (81.1 kg)   12/23/21 0419 113/69 98.2 °F (36.8 °C) Oral 65 16 93 % --     No intake/output data recorded.   I/O this shift:  In: 10 [I.V.:10]  Out: -     Physical Exam:  General Appearance: alert and oriented to person, place and time, well developed and well- nourished, in no acute distress  Skin: warm and dry, no rash or erythema  Head: normocephalic and atraumatic  Eyes: pupils equal, round, and reactive to light, extraocular eye movements intact, conjunctivae normal  ENT: external ear and ear canal normal bilaterally, nose without deformity, nasal mucosa and turbinates normal  Neck: supple and non-tender without mass, no cervical lymphadenopathy  Pulmonary/Chest: clear to auscultation bilaterally- no wheezes, rales or rhonchi, normal air movement, no respiratory distress  Cardiovascular: normal rate, regular rhythm,  no murmurs, rubs, distal pulses intact, no carotid bruits  Abdomen: soft, non-tender, non-distended, normal bowel sounds, no masses or organomegaly  Lymph Nodes: Cervical, supraclavicular normal  Extremities: no cyanosis, clubbing or edema  Musculoskeletal: normal range of motion, no joint swelling, deformity or tenderness  Neurologic: alert, no focal neurologic deficits    Data Review:  CBC:   Lab Results   Component Value Date    WBC 5.9 12/22/2021    RBC 2.57 12/22/2021    RBC 2.67 03/17/2016     BMP:   Lab Results   Component Value Date    GLUCOSE 100 12/22/2021    CO2 24 12/22/2021    BUN 9 12/22/2021    CREATININE 0.6 12/22/2021    CALCIUM 8.8 12/22/2021     ABG: No results found for: TYU3UBC, BEART, Z6YKWISC, PHART, THGBART, MUE3MKU, PO2ART, DHI5RQW    Radiology: All pertinent images / reports were reviewed as a part of this visit.     Narrative   EXAMINATION:   ONE XRAY VIEW OF THE CHEST       12/21/2021 10:56 am       COMPARISON:   None.       HISTORY:   ORDERING SYSTEM PROVIDED HISTORY: SOB   TECHNOLOGIST PROVIDED HISTORY:   Reason for exam:->SOB   Reason for Exam: shortness of breath       FINDINGS:   The cardiomediastinal silhouette is unremarkable.  Aortic vascular   calcification.  The lungs are clear with no infiltrate, pleural fluid or evidence of overt failure.  Small calcified granuloma on the left.           Impression   No acute cardiopulmonary disease. Problem List:   NIELSEN  Anemia, macrocytic    Assessment/Plan:     Reviewed patient's chest x-ray, appears clear. Cardiac work-up negative including 2D echocardiogram and nuclear stress test. D-dimer is low. Doubt value of further imaging such as CT chest. Also patient does not have prior history of asthma, non-smoker. Doubt value of PFT study. Has macrocytic anemia of significance, hemoglobin of 9.3-low vitamin B12. Has history of pernicious anemia and Crohn's disease. This is likely contributing to patient's dyspnea and fatigue. Will consult hematology for further evaluation. No intrinsic pulmonary cause for patient's dyspnea. Pulmonary will sign off.     Sarah Beth Patel MD

## 2021-12-23 NOTE — DISCHARGE SUMMARY
Hospital Medicine Discharge Summary    Patient: Song Platt     Gender: female  : 1955   Age: 74 y.o. MRN: [id number]    Admitting Physician: Francie James MD  Discharge Physician: Francie James MD     Code Status: Full Code     Admit Date: 2021   Discharge Date:   21    Disposition:  Home  Time spent arranging discharge: 35 minutes    Discharge Diagnoses: Active Hospital Problems    Diagnosis Date Noted    Exertional dyspnea [R06.00] 2021   anemia        Condition at Discharge:  Stable    Hospital Course:   Admitted to hospital with exertional dyspnea patient was walked for 6 minutes did not desaturate but did have some shortness of breath. Echo and stress test were negative neurology was consulted patient normal D-dimer recommended no further work-up no CT chest or PFTs. Patient was seen by oncology for anemia has a history of pernicious anemia was given IM injection of B12 and follow-up with hematology as outpatient    Discharge Exam:    /67   Pulse 68   Temp 97.9 °F (36.6 °C) (Oral)   Resp 16   Ht 5' 3\" (1.6 m)   Wt 178 lb 12.8 oz (81.1 kg)   SpO2 99%   BMI 31.67 kg/m²   General appearance:  Appears comfortable. AAOx3  HEENT: atraumatic, Pupils equal, muscous membranes moist, no masses appreciated  Cardiovascular: Regular rate and rhythm no murmurs appreciated  Respiratory: CTAB no wheezing  Gastrointestinal: Abdomen soft, non-tender, BS+  EXT: no edema  Neurology: no gross focal deficts  Psychiatry: Appropriate affect.  Not agitated  Skin: Warm, dry, no rashes appreciated    Discharge Medications:   Current Discharge Medication List        Current Discharge Medication List        Current Discharge Medication List      CONTINUE these medications which have NOT CHANGED    Details   vitamin D (CHOLECALCIFEROL) 25 MCG (1000 UT) TABS tablet Take 2 tablets by mouth daily  Qty: 60 tablet, Refills: 11    Associated Diagnoses: Vitamin D deficiency Procedure Type of Study   TTE procedure:ECHOCARDIOGRAM COMPLETE 2D W DOPPLER W COLOR. Procedure Date Date: 12/22/2021 Start: 11:47 AM Study Location: [facility] - Echo Lab Technical Quality: Good visualization Indications:Dyspnea/SOB. Patient Status: Routine Height: 63 inches Weight: 187 pounds BSA: 1.88 m2 BMI: 33.13 kg/m2 HR: 66 bpm BP: 140/69 mmHg  Conclusions   Summary  Normal left ventricle size, wall thickness, and systolic function with an  estimated ejection fraction of 55-60%. No regional wall motion abnormalities  are seen. Global longitudinal strain: -21% (normal). Normal diastolic function. Mild tricuspid regurgitation with a PASP of 28 mmHg. Mild pulmonic regurgitation. Signature   ------------------------------------------------------------------  Electronically signed by Olga Felty MD, [facility], [address]  (Interpreting physician) on 12/22/2021 at 02:40 PM  ------------------------------------------------------------------   Findings   Left Ventricle  Normal left ventricle size, wall thickness, and systolic function with an  estimated ejection fraction of 55-60%. No regional wall motion abnormalities  are seen. Average E/e': 9.9. Global longitudinal strain: -21% (normal). Normal diastolic function. Mitral Valve  The mitral valve normal in structure and function. Trivial mitral regurgitation. Left Atrium  The left atrium is normal in size. Aortic Valve  The aortic valve is mildly thickened/calcified but opens well with normal  gradients. There is no significant aortic valve regurgitation or stenosis. Tricuspid aortic valve. Aorta  The aortic root is normal in size. Right Ventricle  The right ventricle is normal in size and function. TAPSE: 2.91 cm. RV s' velocity: 17.8 cm/s. Tricuspid Valve  The tricuspid valve is normal in structure and function. Mild tricuspid regurgitation with a PASP of 28 mmHg.    Right Atrium  The right atrial size is normal.   Pulmonic Valve  The pulmonic valve is not well visualized. Mild pulmonic regurgitation. Pericardial Effusion  No pericardial effusion noted. Pleural Effusion  No pleural effusion. Miscellaneous  The inferior vena cava appears normal in size with normal respiratory  variation. M-Mode/2D Measurements (cm)   LV Diastolic Dimension: 1.25 cm LV Systolic Dimension: 2.9 cm  LV Septum Diastolic: 1.72 cm  LV PW Diastolic: 6.53 cm        AO Root Dimension: 3.2 cm                                  LA Dimension: 3.6 cm                                  LA Area: 18.8 cm2  LVOT: 2.1 cm                    LA volume/Index: 54.57 ml /29 ml/m2  Doppler Measurements   AV Peak Velocity: 206 cm/s     MV Peak E-Wave: 88.7 cm/s  AV Peak Gradient: 16.97 mmHg   MV Peak A-Wave: 57.8 cm/s  AV Mean Gradient: 8 mmHg       MV E/A Ratio: 1.53  LVOT Peak Velocity: 131 cm/s  AV Area (Continuity):2.39 cm2   TR Velocity:250 cm/s  TR Gradient:25 mmHg            MV Deceleration Time: 280 msec   E' Septal Velocity: 7.45 cm/s  E' Lateral Velocity: 11.3 cm/s  PV Peak Velocity: 104 cm/s  PV Peak Gradient: 4.33 mmHg   Aortic Valve   Peak Velocity: 206 cm/s     Mean Velocity: 135 cm/s  Peak Gradient: 16.97 mmHg   Mean Gradient: 8 mmHg  Area (continuity): 2.39 cm2  AV VTI: 44.5 cm  Aorta   Aortic Root: 3.2 cm  Ascending Aorta: 3.6 cm  LVOT Diameter: 2.1 cm      US GALLBLADDER RUQ    Result Date: 12/22/2021  EXAMINATION: RIGHT UPPER QUADRANT ULTRASOUND 12/22/2021 12:34 pm COMPARISON: None. HISTORY: ORDERING SYSTEM PROVIDED HISTORY:  Elevated bili TECHNOLOGIST PROVIDED HISTORY: Reason for Exam:  Elevated bili Reason for Exam:  Elevated bili Additional signs and symptoms:  NA Relevant Medical/Surgical History:  NA FINDINGS: LIVER:  The liver demonstrates normal echogenicity without evidence of intrahepatic biliary ductal dilatation. BILIARY SYSTEM:  Folds are identified within the gallbladder.   Otherwise, gallbladder is unremarkable without evidence of pericholecystic fluid, wall thickening or stones. Negative sonographic Malave's sign. Anteriorly, gallbladder wall measures 0.2 cm in thickness. Common bile duct is within normal limits measuring 0.5 cm in AP diameter in the region the harmony hepatis. RIGHT KIDNEY: Right kidney measures 12 cm in length. Small cyst is identified within the right kidney measuring 1.9 x 1.3 x 1.5 cm. PANCREAS:  Visualized portions of the pancreas are unremarkable. 1. 1.9 cm right renal cyst. 2. Otherwise, essentially unremarkable right upper quadrant ultrasound. XR CHEST PORTABLE    Result Date: 12/21/2021  EXAMINATION: ONE XRAY VIEW OF THE CHEST 12/21/2021 10:56 am COMPARISON: None. HISTORY: ORDERING SYSTEM PROVIDED HISTORY: SOB TECHNOLOGIST PROVIDED HISTORY: Reason for exam:->SOB Reason for Exam: shortness of breath FINDINGS: The cardiomediastinal silhouette is unremarkable. Aortic vascular calcification. The lungs are clear with no infiltrate, pleural fluid or evidence of overt failure. Small calcified granuloma on the left. No acute cardiopulmonary disease. NM Cardiac Stress Test Nuclear Imaging    Result Date: 12/22/2021  Cardiac Perfusion Imaging  Demographics   Patient Name      Jamie Monday I   Date of Study     12/22/2021         Gender              Female   Patient Number    [id number]         Date of Birth       1955   Visit Number      [id number]          Age                 74 year(s)   Accession Number  [id number]         Room Number         5521   Corporate ID      [id number]           NM Technician       Nolan Shanks, RT   Nurse             Reyes Constant, RN Interpreting        Jayy Flattery, DO                                       Physician   Ordering          Leandra Arellano,  Physician         MD   The procedure was explained in detail to the patient. Risks,  complications and alternative treatments were reviewed. Written consent  was obtained.   Procedure Procedure Type:   Nuclear Stress Test:NM MYOCARDIAL SPECT REST EXERCISE OR RX,  Pharmacological   Study location: [facility],[address] Floor Unit Medicine   Indications: Chest pain. Hospital Status: Outpatient. Height: 63 inches Weight: 187 pounds  Risk Factors   The patient risk factors include:prior PCI;obesity, physical activity,  treated and controlled hypercholesterolemia, treated and controlled  hypertension, family history of premature CAD and dyslipidemia. Conclusions   Summary  The overall quality of the study is good. There is subdiaphragmatic  attenuation. Left ventricular cavity size is normal. The right ventricle is normal in  size. SPECT images demonstrate homogeneous tracer distribution throughout the  myocardium. There is normal isotope uptake at stress and rest. There is no  evidence of myocardial ischemia or scar. Sum stress score of 0. No visual TID. Calculated TID is 1.15   Left ventricular ejection fraction is normal at 72%. Myocardial perfusion is normal. Low Risk scan. Stress Protocols   Resting ECG  SInus rhythm. Low voltage. Resting HR:71 bpm     Resting BP:116/687 mmHg   Pre-stress physical exam: Resting pulse ox 100%. Stress Protocol:Pharmacologic - Lexiscan's  Peak HR:88 bpm                   HR/BP product:7832  Peak BP:89/60 mmHg  Predicted HR: 152 bpm  % of predicted HR: 58  Test duration: 4 min  Reason for termination:Completed   ECG Findings  Sinus tachycardia. Arrhythmias  No significant rhythm abnormality. Symptoms  Developed symptoms likely related to [address] [place] [address]. There was stress induced nausea and vomiting. Symptoms resolved with aminophylline. Stress Interpretation  Non-diagnostic EKG response due to failure to  reach target heart rate with lexiscan. Less than 0.5 mm st deviation with lexiscan.   Procedure Medications   - Aminophylline I.V. 100 mg.   - Lexiscan I.V. 0.4 mg.  Imaging Protocols   - One Day   Rest                          Stress   Isotope:Myoview/Tetrofosmin   Isotope: Myoview/Tetrofosmin  Isotope dose:10.99 mCi        Isotope dose:31.33 mCi  Administration Route:I.V.      Administration Route:I.V.  Date:2021 10:06         Date:2021 11:33                                 Technique:      Gated  Imaging Results    Stress ejection    Ejection fraction:72 %    EDV :97 ml    ESV :27 ml    Stroke volume :70 ml    LV mass :138 gr  Medical History   Additional Medical History   Past Medical History:  Diagnosis Date  - Crohn's disease  - Palpable purpura  - Pernicious anemia   Past Surgical History:  Procedure Laterality Date  - ABDOMEN SURGERY  Bowel Obstruction w resection  - CARDIAC CATHETERIZATION  uc  -  SECTION  - INNER EAR SURGERY   Signatures   ------------------------------------------------------------------  Electronically signed by Francisco Dupont DO (Interpreting  physician) on 2021 at 16:09  ------------------------------------------------------------------          Signed:    Bryan Reed MD   2021

## 2022-03-23 ENCOUNTER — NURSE TRIAGE (OUTPATIENT)
Dept: OTHER | Facility: CLINIC | Age: 67
End: 2022-03-23

## 2022-03-23 NOTE — TELEPHONE ENCOUNTER
Received call from [facility] with Red Flag Complaint. Subjective: Caller states \"I had a colonoscopy and an EGD back in February. I have chronic gastritis. Since they went down my throat I have a continuous cough and in my chest it feels like a hoarseness. It caused me to lose my breath and it puts me into where I can't catch my breath. \"     Current Symptoms: Periodic shortness of breath, cough, hoarseness    Onset: 1 month ago; worsening    Associated Symptoms: NA    Pain Severity: no pain    Temperature: none     What has been tried: nothing    LMP: NA Pregnant: NA    Recommended disposition: See PCP within 3 Days    Care advice provided, patient verbalizes understanding; denies any other questions or concerns; instructed to call back for any new or worsening symptoms. Patient/Caller agrees with recommended disposition; writer provided warm transfer to [facility] for appointment scheduling     Attention Provider: Thank you for allowing me to participate in the care of your patient. The patient was connected to triage in response to information provided to the ECC/PSC. Please do not respond through this encounter as the response is not directed to a shared pool.           Reason for Disposition   [1] MODERATE longstanding difficulty breathing (e.g., speaks in phrases, SOB even at rest, pulse 100-120) AND [2] SAME as normal    Protocols used: BREATHING DIFFICULTY-ADULT-AH

## 2022-03-25 ENCOUNTER — TELEMEDICINE (OUTPATIENT)
Dept: INTERNAL MEDICINE CLINIC | Age: 67
End: 2022-03-25
Payer: MEDICARE

## 2022-03-25 DIAGNOSIS — K21.9 GASTROESOPHAGEAL REFLUX DISEASE, UNSPECIFIED WHETHER ESOPHAGITIS PRESENT: ICD-10-CM

## 2022-03-25 DIAGNOSIS — D51.1 VITAMIN B12 DEFICIENCY ANEMIA DUE TO SELECTIVE VITAMIN B12 MALABSORPTION WITH PROTEINURIA: ICD-10-CM

## 2022-03-25 DIAGNOSIS — E53.8 B12 DEFICIENCY: ICD-10-CM

## 2022-03-25 DIAGNOSIS — E55.9 VITAMIN D DEFICIENCY: ICD-10-CM

## 2022-03-25 DIAGNOSIS — K50.90 CROHN'S DISEASE WITHOUT COMPLICATION, UNSPECIFIED GASTROINTESTINAL TRACT LOCATION (HCC): Primary | ICD-10-CM

## 2022-03-25 DIAGNOSIS — D61.818 PANCYTOPENIA (HCC): ICD-10-CM

## 2022-03-25 DIAGNOSIS — E53.8 FOLATE DEFICIENCY: ICD-10-CM

## 2022-03-25 DIAGNOSIS — R49.0 HOARSENESS: ICD-10-CM

## 2022-03-25 DIAGNOSIS — R05.9 COUGH: ICD-10-CM

## 2022-03-25 DIAGNOSIS — D53.9 MACROCYTIC ANEMIA: ICD-10-CM

## 2022-03-25 PROCEDURE — 99443 PR PHYS/QHP TELEPHONE EVALUATION 21-30 MIN: CPT | Performed by: NURSE PRACTITIONER

## 2022-03-25 RX ORDER — PANTOPRAZOLE SODIUM 20 MG/1
20 TABLET, DELAYED RELEASE ORAL DAILY
COMMUNITY
Start: 2022-03-23 | End: 2022-10-27 | Stop reason: ALTCHOICE

## 2022-03-25 SDOH — ECONOMIC STABILITY: FOOD INSECURITY: WITHIN THE PAST 12 MONTHS, YOU WORRIED THAT YOUR FOOD WOULD RUN OUT BEFORE YOU GOT MONEY TO BUY MORE.: NEVER TRUE

## 2022-03-25 SDOH — ECONOMIC STABILITY: FOOD INSECURITY: WITHIN THE PAST 12 MONTHS, THE FOOD YOU BOUGHT JUST DIDN'T LAST AND YOU DIDN'T HAVE MONEY TO GET MORE.: NEVER TRUE

## 2022-03-25 ASSESSMENT — PATIENT HEALTH QUESTIONNAIRE - PHQ9
SUM OF ALL RESPONSES TO PHQ QUESTIONS 1-9: 0
1. LITTLE INTEREST OR PLEASURE IN DOING THINGS: 0
SUM OF ALL RESPONSES TO PHQ9 QUESTIONS 1 & 2: 0
2. FEELING DOWN, DEPRESSED OR HOPELESS: 0
SUM OF ALL RESPONSES TO PHQ QUESTIONS 1-9: 0

## 2022-03-25 ASSESSMENT — SOCIAL DETERMINANTS OF HEALTH (SDOH): HOW HARD IS IT FOR YOU TO PAY FOR THE VERY BASICS LIKE FOOD, HOUSING, MEDICAL CARE, AND HEATING?: NOT HARD AT ALL

## 2022-03-25 NOTE — PROGRESS NOTES
Elen Holder is a 74 y.o. female evaluated via telephone on 3/25/2022 for Hoarse (Since Feb. 2022 )    Documentation:  I communicated with the patient and/or health care decision maker about hoarseness. Details of this discussion including any medical advice provided:     Pt is over due for appt. She is scheduled today for a phone visit. She had anemia in Dec, she is now seeing oncology ([facility]) and GI with [facility]. Today she has concerns of hoarseness and coughing that started back in Feb and have not changed since onset. She feels symptoms started around the time of her EGD / colonoscopy. She reports intermittent chronic dyspnea that started prior to Feb. All symptoms are intermittent - sometimes during the day and at night. Seems worse at night. She has been off her PPI. She saw GI a few weeks ago for follow up. Assessment/Plan:    Diagnoses and all orders for this visit:    Crohn's disease without complication, unspecified gastrointestinal tract location (HCC)/ Pancytopenia (HCC)/ B12 deficiency/ Folate deficiency/ Vitamin D deficiency/ Vitamin B12 deficiency anemia due to selective vitamin B12 malabsorption with proteinuria/ Macrocytic anemia  Stable, continue with supplements as directed  Continue seeing GI and [facility] for follow up     Hoarseness/ Gastroesophageal reflux disease, unspecified whether esophagitis present/ Cough  Uncontrolled. No red flag complaints today. Consistent with uncontrolled GERD. Restart PPI - Rx sent 2 days ago - encouraged     Return for AWV. in office     Total Time: minutes: 21-30 minutes    Chantell Landeros was evaluated through a synchronous (real-time) audio encounter. Patient identification was verified at the start of the visit. She (or guardian if applicable) is aware that this is a billable service, which includes applicable co-pays. This visit was conducted with the patient's (and/or legal guardian's) verbal consent.  She has not had a related appointment within my department in the past 7 days or scheduled within the next 24 hours. The patient was located in a state where the provider was licensed to provide care.     Note: not billable if this call serves to triage the patient into an appointment for the relevant concern    JAILENE Montes - CNP

## 2022-04-04 ENCOUNTER — OFFICE VISIT (OUTPATIENT)
Dept: INTERNAL MEDICINE CLINIC | Age: 67
End: 2022-04-04
Payer: MEDICARE

## 2022-04-04 VITALS
OXYGEN SATURATION: 96 % | BODY MASS INDEX: 34.02 KG/M2 | DIASTOLIC BLOOD PRESSURE: 60 MMHG | WEIGHT: 192 LBS | HEIGHT: 63 IN | SYSTOLIC BLOOD PRESSURE: 126 MMHG | HEART RATE: 60 BPM

## 2022-04-04 DIAGNOSIS — M25.562 CHRONIC PAIN OF BOTH KNEES: ICD-10-CM

## 2022-04-04 DIAGNOSIS — R41.3 MEMORY CHANGE: ICD-10-CM

## 2022-04-04 DIAGNOSIS — E78.5 DYSLIPIDEMIA: ICD-10-CM

## 2022-04-04 DIAGNOSIS — M25.561 CHRONIC PAIN OF BOTH KNEES: ICD-10-CM

## 2022-04-04 DIAGNOSIS — G89.29 CHRONIC PAIN OF BOTH KNEES: ICD-10-CM

## 2022-04-04 DIAGNOSIS — Z12.31 ENCOUNTER FOR SCREENING MAMMOGRAM FOR MALIGNANT NEOPLASM OF BREAST: ICD-10-CM

## 2022-04-04 DIAGNOSIS — D51.1 VITAMIN B12 DEFICIENCY ANEMIA DUE TO SELECTIVE VITAMIN B12 MALABSORPTION WITH PROTEINURIA: Primary | ICD-10-CM

## 2022-04-04 PROBLEM — R06.09 EXERTIONAL DYSPNEA: Status: RESOLVED | Noted: 2021-12-21 | Resolved: 2022-04-04

## 2022-04-04 PROCEDURE — 99214 OFFICE O/P EST MOD 30 MIN: CPT | Performed by: NURSE PRACTITIONER

## 2022-04-04 RX ORDER — CYANOCOBALAMIN 1000 UG/ML
1000 INJECTION INTRAMUSCULAR; SUBCUTANEOUS ONCE
COMMUNITY

## 2022-04-04 ASSESSMENT — PATIENT HEALTH QUESTIONNAIRE - PHQ9
2. FEELING DOWN, DEPRESSED OR HOPELESS: 0
SUM OF ALL RESPONSES TO PHQ9 QUESTIONS 1 & 2: 0
SUM OF ALL RESPONSES TO PHQ QUESTIONS 1-9: 0
1. LITTLE INTEREST OR PLEASURE IN DOING THINGS: 0
SUM OF ALL RESPONSES TO PHQ QUESTIONS 1-9: 0

## 2022-04-04 ASSESSMENT — ENCOUNTER SYMPTOMS
GASTROINTESTINAL NEGATIVE: 1
RESPIRATORY NEGATIVE: 1

## 2022-04-04 ASSESSMENT — LIFESTYLE VARIABLES: HOW OFTEN DO YOU HAVE A DRINK CONTAINING ALCOHOL: NEVER

## 2022-04-04 NOTE — PROGRESS NOTES
SUBJECTIVE:    Patient ID: Candido Mena is a 74 y.o. female. CC: Vertigo, B12 deficiency, knee pain, memory concerns    HPI: The patient presents to the office today for follow-up of chronic medical conditions, recent medical concerns, and to reestablish with primary care after being lost to follow-up since 2020. At her last appointment with me in 2020, the patient reported dizziness. After failing to improve with conservative treatment, she was referred to ENT. She ultimately went to numerous sessions of vestibular rehabilitation which she feels helped. She still has occasional dizziness. In December, the patient went to the emergency department after reporting exertional dyspnea. She reported fatigue that had been worsening for some time. Hospital work-up included echo and stress test which were negative. D-dimer was negative. The patient was found to be anemic and started on B12 injections. Patient is continue to follow-up with hematology. She is getting monthly B12 injections. She feels her fatigue is improving. She reports she is doing better overall and has gained 20 pounds. Patient has a history of knee arthritis. She had a history of knee pain and previous x-ray showed moderate disease of the left knee. She currently reports bilateral knee pain now worse on the right. There has been no injury or trauma. She has worsening pain and stiffness in the morning and after exerting herself. She has tried over-the-counter remedies which help some. She has concerns about memory. She denies any other neurological changes.       Past Medical History:   Diagnosis Date    Crohn's disease ([facility] [address]     Palpable purpura (HCC)     Pernicious anemia         Current Outpatient Medications   Medication Sig Dispense Refill    cyanocobalamin 1000 MCG/ML injection Inject 1,000 mcg into the muscle once      pantoprazole (PROTONIX) 20 MG tablet Take 20 mg by mouth daily       No current facility-administered medications for this visit. Review of Systems   Constitutional: Positive for fatigue (improving). Respiratory: Negative. Cardiovascular: Negative. Gastrointestinal: Negative. Musculoskeletal: Positive for arthralgias and gait problem. Neurological:        Memory concerns   Psychiatric/Behavioral: Negative. OBJECTIVE:  Physical Exam  Vitals reviewed. Constitutional:       General: She is not in acute distress. Appearance: She is well-developed. She is not diaphoretic. HENT:      Head: Normocephalic and atraumatic. Eyes:      General: No scleral icterus. Conjunctiva/sclera: Conjunctivae normal.   Neck:      Vascular: No JVD. Cardiovascular:      Rate and Rhythm: Normal rate and regular rhythm. Pulmonary:      Effort: Pulmonary effort is normal. No respiratory distress. Breath sounds: Normal breath sounds. No wheezing. Abdominal:      General: There is no distension. Palpations: Abdomen is soft. Tenderness: There is no abdominal tenderness. There is no guarding or rebound. Musculoskeletal:         General: Normal range of motion. Cervical back: Neck supple. Skin:     General: Skin is warm and dry. Neurological:      Mental Status: She is alert and oriented to person, place, and time. Psychiatric:         Behavior: Behavior normal.         Thought Content: Thought content normal.        /60   Pulse 60   Ht 5' 3\" (1.6 m)   Wt 192 lb (87.1 kg)   SpO2 96%   BMI 34.01 kg/m²      PHQ Scores 4/4/2022 3/25/2022 7/27/2020 12/18/2019 6/12/2019 12/7/2018   PHQ2 Score 0 0 0 3 0 0   PHQ9 Score 0 0 0 6 0 0     Interpretation of Total Score Depression Severity: 1-4 = Minimal depression, 5-9 = Mild depression, 10-14 = Moderate depression, 15-19 = Moderately severe depression, 20-27 =Severe depression      ASSESSMENT/PLAN:  Chantell was seen today for medicare aw.   Diagnoses and all orders for this visit:    Vitamin B12 deficiency anemia due to selective vitamin B12 malabsorption with proteinuria  -Found to have relatively severe B12 deficiency anemia during hospitalization for shortness of breath  -Symptoms have been improving since she was started on B12 injections  -He is a patient of hematology    Dyslipidemia  - Chronic  - Monitor  -     LIPID PANEL; Future    Chronic pain of both knees  - Previously had left knee pain. This is now bilateral and worse on the right  - Previous left knee x-ray a number of years ago showed moderate arthritis. Suspect this is worsened  - We discussed treatment options for arthritis including over-the-counter remedies, topical treatments, knee injections, physical therapy  - Patient is requesting updated x-rays of her knees. She will decide once she has these results how she would like to proceed with treatment  -     XR KNEE RIGHT (3 VIEWS); Future  -     XR KNEE LEFT (3 VIEWS); Future    Memory change  - A new concern for her. This seemed to start around the same time she was diagnosed with B12 anemia and I discussed that this could be related. - Recommended she return for her annual wellness visit which will include cognitive screening.  - Slums as needed  -     TSH with Reflex; Future    Encounter for screening mammogram for malignant neoplasm of breast  -     JACKLYN DIGITAL SCREEN W OR WO CAD BILATERAL    Here with a number of medical concerns and to reestablish care.   Recommend she return in a short time to discuss x-ray results, complete annual wellness visit which includes cognitive screening      JAILENE Candelaria - CNP

## 2022-05-11 ENCOUNTER — TELEPHONE (OUTPATIENT)
Dept: INTERNAL MEDICINE CLINIC | Age: 67
End: 2022-05-11

## 2022-05-11 ENCOUNTER — HOSPITAL ENCOUNTER (OUTPATIENT)
Age: 67
Discharge: HOME | End: 2022-05-11
Payer: MEDICARE

## 2022-05-11 ENCOUNTER — HOSPITAL ENCOUNTER (OUTPATIENT)
Dept: GENERAL RADIOLOGY | Age: 67
Discharge: HOME | End: 2022-05-11
Payer: MEDICARE

## 2022-05-11 DIAGNOSIS — R41.3 MEMORY CHANGE: ICD-10-CM

## 2022-05-11 DIAGNOSIS — M25.561 CHRONIC PAIN OF BOTH KNEES: ICD-10-CM

## 2022-05-11 DIAGNOSIS — E78.5 DYSLIPIDEMIA: ICD-10-CM

## 2022-05-11 DIAGNOSIS — G89.29 CHRONIC PAIN OF BOTH KNEES: ICD-10-CM

## 2022-05-11 DIAGNOSIS — M25.562 CHRONIC PAIN OF BOTH KNEES: ICD-10-CM

## 2022-05-11 LAB
CHOLESTEROL, TOTAL: 174 MG/DL (ref 0–199)
HDLC SERPL-MCNC: 48 MG/DL (ref 40–60)
LDL CHOLESTEROL CALCULATED: 102 MG/DL
T4 FREE: 1.2 NG/DL (ref 0.9–1.8)
TRIGL SERPL-MCNC: 122 MG/DL (ref 0–150)
TSH REFLEX: 5.11 UIU/ML (ref 0.27–4.2)
VLDLC SERPL CALC-MCNC: 24 MG/DL

## 2022-05-11 PROCEDURE — 84439 ASSAY OF FREE THYROXINE: CPT

## 2022-05-11 PROCEDURE — 73562 X-RAY EXAM OF KNEE 3: CPT

## 2022-05-11 PROCEDURE — 36415 COLL VENOUS BLD VENIPUNCTURE: CPT

## 2022-05-11 PROCEDURE — 84443 ASSAY THYROID STIM HORMONE: CPT

## 2022-05-11 PROCEDURE — 80061 LIPID PANEL: CPT

## 2022-05-19 ENCOUNTER — OFFICE VISIT (OUTPATIENT)
Dept: INTERNAL MEDICINE CLINIC | Age: 67
End: 2022-05-19
Payer: MEDICARE

## 2022-05-19 VITALS
OXYGEN SATURATION: 96 % | WEIGHT: 193 LBS | SYSTOLIC BLOOD PRESSURE: 120 MMHG | HEART RATE: 66 BPM | DIASTOLIC BLOOD PRESSURE: 80 MMHG | BODY MASS INDEX: 34.2 KG/M2 | HEIGHT: 63 IN

## 2022-05-19 DIAGNOSIS — E78.5 DYSLIPIDEMIA: ICD-10-CM

## 2022-05-19 DIAGNOSIS — Z23 NEED FOR PNEUMOCOCCAL VACCINATION: ICD-10-CM

## 2022-05-19 DIAGNOSIS — M54.16 LUMBAR RADICULAR PAIN: ICD-10-CM

## 2022-05-19 DIAGNOSIS — I10 ESSENTIAL HYPERTENSION: ICD-10-CM

## 2022-05-19 DIAGNOSIS — Z00.00 MEDICARE ANNUAL WELLNESS VISIT, SUBSEQUENT: Primary | ICD-10-CM

## 2022-05-19 DIAGNOSIS — E03.8 SUBCLINICAL HYPOTHYROIDISM: ICD-10-CM

## 2022-05-19 DIAGNOSIS — M17.0 PRIMARY OSTEOARTHRITIS OF BOTH KNEES: ICD-10-CM

## 2022-05-19 PROCEDURE — G0439 PPPS, SUBSEQ VISIT: HCPCS | Performed by: NURSE PRACTITIONER

## 2022-05-19 PROCEDURE — 99214 OFFICE O/P EST MOD 30 MIN: CPT | Performed by: NURSE PRACTITIONER

## 2022-05-19 PROCEDURE — G0009 ADMIN PNEUMOCOCCAL VACCINE: HCPCS | Performed by: NURSE PRACTITIONER

## 2022-05-19 PROCEDURE — 90677 PCV20 VACCINE IM: CPT | Performed by: NURSE PRACTITIONER

## 2022-05-19 RX ORDER — TIZANIDINE 2 MG/1
2 TABLET ORAL 3 TIMES DAILY PRN
Qty: 30 TABLET | Refills: 0 | Status: SHIPPED | OUTPATIENT
Start: 2022-05-19

## 2022-05-19 RX ORDER — METHYLPREDNISOLONE 4 MG/1
TABLET ORAL
Qty: 1 KIT | Refills: 0 | Status: SHIPPED | OUTPATIENT
Start: 2022-05-19 | End: 2022-05-25

## 2022-05-19 ASSESSMENT — PATIENT HEALTH QUESTIONNAIRE - PHQ9
2. FEELING DOWN, DEPRESSED OR HOPELESS: 0
SUM OF ALL RESPONSES TO PHQ QUESTIONS 1-9: 0
1. LITTLE INTEREST OR PLEASURE IN DOING THINGS: 0
SUM OF ALL RESPONSES TO PHQ9 QUESTIONS 1 & 2: 0

## 2022-05-19 ASSESSMENT — LIFESTYLE VARIABLES: HOW OFTEN DO YOU HAVE A DRINK CONTAINING ALCOHOL: NEVER

## 2022-05-19 NOTE — PATIENT INSTRUCTIONS
Personalized Preventive Plan for Angy Matthews - 5/19/2022  Medicare offers a range of preventive health benefits. Some of the tests and screenings are paid in full while other may be subject to a deductible, co-insurance, and/or copay. Some of these benefits include a comprehensive review of your medical history including lifestyle, illnesses that may run in your family, and various assessments and screenings as appropriate. After reviewing your medical record and screening and assessments performed today your provider may have ordered immunizations, labs, imaging, and/or referrals for you. A list of these orders (if applicable) as well as your Preventive Care list are included within your After Visit Summary for your review. Other Preventive Recommendations:    · A preventive eye exam performed by an eye specialist is recommended every 1-2 years to screen for glaucoma; cataracts, macular degeneration, and other eye disorders. · A preventive dental visit is recommended every 6 months. · Try to get at least 150 minutes of exercise per week or 10,000 steps per day on a pedometer . · Order or download the FREE \"Exercise & Physical Activity: Your Everyday Guide\" from [employer]. Call 5-405.984.8503 or search [employer] online. · You need 1616-6980 mg of calcium and 3547-0516 IU of vitamin D per day. It is possible to meet your calcium requirement with diet alone, but a vitamin D supplement is usually necessary to meet this goal.  · When exposed to the sun, use a sunscreen that protects against both UVA and UVB radiation with an SPF of 30 or greater. Reapply every 2 to 3 hours or after sweating, drying off with a towel, or swimming. · Always wear a seat belt when traveling in a car. Always wear a helmet when riding a bicycle or motorcycle.

## 2022-05-19 NOTE — PROGRESS NOTES
Medicare Annual Wellness Visit    [address] is here for Medicare AWV and Back Pain (low back pain shoots down left leg )    Assessment & Plan   Medicare annual wellness visit, subsequent        Recommendations for Preventive Services Due: see orders and patient instructions/AVS.  Recommended screening schedule for the next 5-10 years is provided to the patient in written form: see Patient Instructions/AVS.     Return in 6 months (on 11/19/2022) for Medicare Annual Wellness Visit in 1 year. Subjective       Lumbar radicular pain  - 2-week history of left lower back pain with radiation down the posterior left leg to about the knee. No known injury or trauma. Pain is worse with activity  - She has taken over-the-counter anti-inflammatories. - Discussed treatment options including steroid pack, muscle relaxant, physical therapy. She would like to hold off on therapy for now  -     methylPREDNISolone (MEDROL, GI,) 4 MG tablet; Take as directed  -     tiZANidine (ZANAFLEX) 2 MG tablet; Take 1 tablet by mouth 3 times daily as needed (scitica)    Primary osteoarthritis of both knees  -Reviewed her recent x-ray results which showed bilateral tricompartmental arthritis.  -She is using anti-inflammatories at home.  -We discussed arthritis and treatment options. Essential hypertension  - Normotensive  - she has met JNC standards.  - Continue current regimen. Dyslipidemia  - Chronic, stable  - Continue current regimen    Subclinical hypothyroidism  -Reviewed recent labs which showed mildly elevated TSH with normal T4  -Educated on subclinical hypothyroidism  - Monitor    Need for pneumococcal vaccination  -     Pneumococcal Conjugate PCV20, PF (Prevnar 20)          Patient's complete Health Risk Assessment and screening values have been reviewed and are found in Flowsheets. The following problems were reviewed today and where indicated follow up appointments were made and/or referrals ordered.     Positive Risk Factor Screenings with Interventions:             General Health and ACP:  General  In general, how would you say your health is?: Good  In the past 7 days, have you experienced any of the following: New or Increased Pain, New or Increased Fatigue, Loneliness, Social Isolation, Stress or Anger?: No  Do you get the social and emotional support that you need?: Yes  Do you have a Living Will?: Yes    Advance Directives     Power of  Living Will ACP-Advance Directive ACP-Power of     Not on File Not on File Not on File Not on File      General Health Risk Interventions:  · Monitor    Health Habits/Nutrition:     Physical Activity: Inactive    Days of Exercise per Week: 0 days    Minutes of Exercise per Session: 0 min     Have you lost any weight without trying in the past 3 months?: No  Body mass index: (!) 34.18  Have you seen the dentist within the past year?: Yes    Health Habits/Nutrition Interventions:  · Monitor    Hearing/Vision:  Do you or your family notice any trouble with your hearing that hasn't been managed with hearing aids?: No  Do you have difficulty driving, watching TV, or doing any of your daily activities because of your eyesight?: No  Have you had an eye exam within the past year?: (!) No  No exam data present    Hearing/Vision Interventions:  · Vision concerns:  patient encouraged to make appointment with his/her eye specialist            Objective   Vitals:    05/19/22 0848   BP: 120/80   Pulse: 66   SpO2: 96%   Weight: 193 lb (87.5 kg)   Height: 5' 3\" (1.6 m)      Body mass index is 34.19 kg/m². Gen: NAD  Eyes: no icterus, no conjunctival erythema  CV: RRR, no mrgs  Resp: CTAB  Abd: soft, NTTP  Neuro: alert, oriented, answers questions appropriately  MSK: no peripheral edema. No tenderness over the lumbar spine. She has tenderness with palpation of the lumbar musculature and exquisite tenderness with palpation of the left sciatic notch.   Positive straight leg raise on the left. Negative on the right. Skin: warm, dry  Psych: Normal mood, affect        No Known Allergies  Prior to Visit Medications    Medication Sig Taking?  Authorizing Provider   methylPREDNISolone (MEDROL, GI,) 4 MG tablet Take as directed Yes JAILENE Johnston CNP   tiZANidine (ZANAFLEX) 2 MG tablet Take 1 tablet by mouth 3 times daily as needed (scitica) Yes JAILENE Johnston CNP   cyanocobalamin 1000 MCG/ML injection Inject 1,000 mcg into the muscle once  Historical Provider, MD   pantoprazole (PROTONIX) 20 MG tablet Take 20 mg by mouth daily  Historical Provider, MD Falk (Including outside providers/suppliers regularly involved in providing care):   Patient Care Team:  JAILENE Johnston CNP as PCP - General (Family Nurse Practitioner)  JAILENE Johnston CNP as PCP - [facility] Empaneled Provider  Rhina Blackwell MD as Consulting Physician (Obstetrics & Gynecology)  Vlad Lindquist MD as Consulting Physician (Internal Medicine)     Reviewed and updated this visit:  Tobacco  Allergies  Meds  Med Hx  Surg Hx  Soc Hx  Fam Hx

## 2022-07-19 ENCOUNTER — HOSPITAL ENCOUNTER (OUTPATIENT)
Dept: WOMENS IMAGING | Age: 67
Discharge: HOME | End: 2022-07-19
Payer: MEDICARE

## 2022-07-19 VITALS — HEIGHT: 64 IN | WEIGHT: 189 LBS | BODY MASS INDEX: 32.27 KG/M2

## 2022-07-19 DIAGNOSIS — Z12.31 ENCOUNTER FOR SCREENING MAMMOGRAM FOR MALIGNANT NEOPLASM OF BREAST: ICD-10-CM

## 2022-07-19 PROCEDURE — 77063 BREAST TOMOSYNTHESIS BI: CPT

## 2022-10-24 ENCOUNTER — TELEPHONE (OUTPATIENT)
Dept: INTERNAL MEDICINE CLINIC | Age: 67
End: 2022-10-24

## 2022-10-24 NOTE — TELEPHONE ENCOUNTER
I recommend office visit as soon as possible.   Meanwhile leg elevation, over-the-counter Tylenol ibuprofen

## 2022-10-24 NOTE — TELEPHONE ENCOUNTER
Pt calling ---yesterday could barely walk on her right leg was swollen and pains shooting up from ankle----today she can walk on it some---what can she do or what can be done? Please call pt. Thanks.

## 2022-10-27 ENCOUNTER — OFFICE VISIT (OUTPATIENT)
Dept: INTERNAL MEDICINE CLINIC | Age: 67
End: 2022-10-27
Payer: MEDICARE

## 2022-10-27 VITALS
HEIGHT: 63 IN | WEIGHT: 197.4 LBS | TEMPERATURE: 97.4 F | OXYGEN SATURATION: 97 % | SYSTOLIC BLOOD PRESSURE: 120 MMHG | BODY MASS INDEX: 34.98 KG/M2 | DIASTOLIC BLOOD PRESSURE: 88 MMHG | HEART RATE: 78 BPM

## 2022-10-27 DIAGNOSIS — M79.604 RIGHT LEG PAIN: Primary | ICD-10-CM

## 2022-10-27 DIAGNOSIS — Z23 NEED FOR INFLUENZA VACCINATION: ICD-10-CM

## 2022-10-27 PROCEDURE — 3078F DIAST BP <80 MM HG: CPT | Performed by: NURSE PRACTITIONER

## 2022-10-27 PROCEDURE — 90694 VACC AIIV4 NO PRSRV 0.5ML IM: CPT | Performed by: NURSE PRACTITIONER

## 2022-10-27 PROCEDURE — 99213 OFFICE O/P EST LOW 20 MIN: CPT | Performed by: NURSE PRACTITIONER

## 2022-10-27 PROCEDURE — G0008 ADMIN INFLUENZA VIRUS VAC: HCPCS | Performed by: NURSE PRACTITIONER

## 2022-10-27 PROCEDURE — 1123F ACP DISCUSS/DSCN MKR DOCD: CPT | Performed by: NURSE PRACTITIONER

## 2022-10-27 PROCEDURE — 3074F SYST BP LT 130 MM HG: CPT | Performed by: NURSE PRACTITIONER

## 2022-10-27 ASSESSMENT — ENCOUNTER SYMPTOMS
GASTROINTESTINAL NEGATIVE: 1
COLOR CHANGE: 0
COUGH: 0
WHEEZING: 0
SHORTNESS OF BREATH: 1

## 2022-10-27 NOTE — PROGRESS NOTES
SUBJECTIVE:    Patient ID: Bill Tony is a 69 y.o. female. CC: Leg swelling, pain    HPI: The patient presents to the office for an acute visit. Right leg pain and swelling started acutely 5 days ago. No known injury or trauma. Some extra walking Saturday. Car ride home from [place] recently and slept in her car overnight. No redness of leg. Pain worse with walking. She has shortness of breath with exertion. No chest pain  No treatment at home. Current Outpatient Medications   Medication Sig Dispense Refill    cyanocobalamin 1000 MCG/ML injection Inject 1,000 mcg into the muscle once      tiZANidine (ZANAFLEX) 2 MG tablet Take 1 tablet by mouth 3 times daily as needed (scitica) (Patient not taking: Reported on 10/27/2022) 30 tablet 0     No current facility-administered medications for this visit. Review of Systems   Constitutional:  Negative for chills and fever. Respiratory:  Positive for shortness of breath. Negative for cough and wheezing. Cardiovascular:  Positive for leg swelling. Negative for chest pain. Gastrointestinal: Negative. Genitourinary: Negative. Musculoskeletal:  Positive for gait problem. Skin: Negative. Negative for color change and wound. OBJECTIVE:  Physical Exam  Constitutional:       Appearance: Normal appearance. HENT:      Head: Normocephalic and atraumatic. Cardiovascular:      Rate and Rhythm: Normal rate and regular rhythm. Pulmonary:      Effort: Pulmonary effort is normal.      Breath sounds: Normal breath sounds. Musculoskeletal:      Right knee: Normal range of motion. Tenderness (posterior) present. Right lower leg: Tenderness present. No edema. Left lower leg: No edema. Skin:     General: Skin is warm and dry. Comments: No redness of leg. She has tenderness with palpation of posterior knee. Numerous superficial veinous varicosities. She has + Smiley's sign on right.    Neurological:      General: No focal deficit present. Mental Status: She is alert and oriented to person, place, and time. Psychiatric:         Mood and Affect: Mood normal.         Behavior: Behavior normal.      /88   Pulse 78   Temp 97.4 °F (36.3 °C)   Ht 5' 3\" (1.6 m)   Wt 197 lb 6.4 oz (89.5 kg)   SpO2 97%   BMI 34.97 kg/m²      PHQ Scores 5/19/2022 4/4/2022 3/25/2022 7/27/2020 12/18/2019 6/12/2019 12/7/2018   PHQ2 Score 0 0 0 0 3 0 0   PHQ9 Score 0 0 0 0 6 0 0     Interpretation of Total Score Depression Severity: 1-4 = Minimal depression, 5-9 = Mild depression, 10-14 = Moderate depression, 15-19 = Moderately severe depression, 20-27 =Severe depression        ASSESSMENT/PLAN:  Chantell was seen today for leg swelling. Diagnoses and all orders for this visit:    Right leg pain  - Recent car ride home from [place] and slept in her car overnight. This could increase risk for DVT. She also has multiple varicosities which could be playing a role. - I do not appreciate any redness or swelling today of the leg. However, she does have tenderness posterior to the knee and a positive Homans' sign  - We will try to arrange for a stat Doppler of the right lower extremity  - She has been asked to use compression hose, knee sleeve, and Tylenol  -     VL DUP LOWER EXTREMITY VENOUS RIGHT    Need for influenza vaccination  -     Influenza, FLUAD, (age 72 y+), IM, Preservative Free, 0.5 mL    Patient advised to go to the emergency department for worsening of symptoms.       JAILENE Colby - CNP

## 2022-10-28 ENCOUNTER — HOSPITAL ENCOUNTER (OUTPATIENT)
Dept: VASCULAR LAB | Age: 67
Discharge: HOME | End: 2022-10-28
Payer: MEDICARE

## 2022-10-28 PROCEDURE — 93971 EXTREMITY STUDY: CPT

## 2022-11-30 ENCOUNTER — OFFICE VISIT (OUTPATIENT)
Dept: INTERNAL MEDICINE CLINIC | Age: 67
End: 2022-11-30
Payer: MEDICARE

## 2022-11-30 VITALS
DIASTOLIC BLOOD PRESSURE: 82 MMHG | TEMPERATURE: 97.2 F | WEIGHT: 194.4 LBS | OXYGEN SATURATION: 97 % | HEART RATE: 60 BPM | SYSTOLIC BLOOD PRESSURE: 136 MMHG | HEIGHT: 63 IN | BODY MASS INDEX: 34.45 KG/M2

## 2022-11-30 DIAGNOSIS — M17.0 PRIMARY OSTEOARTHRITIS OF BOTH KNEES: ICD-10-CM

## 2022-11-30 DIAGNOSIS — D51.1 VITAMIN B12 DEFICIENCY ANEMIA DUE TO SELECTIVE VITAMIN B12 MALABSORPTION WITH PROTEINURIA: ICD-10-CM

## 2022-11-30 DIAGNOSIS — E03.8 SUBCLINICAL HYPOTHYROIDISM: ICD-10-CM

## 2022-11-30 DIAGNOSIS — E78.5 DYSLIPIDEMIA: ICD-10-CM

## 2022-11-30 DIAGNOSIS — K50.90 CROHN'S DISEASE WITHOUT COMPLICATION, UNSPECIFIED GASTROINTESTINAL TRACT LOCATION (HCC): ICD-10-CM

## 2022-11-30 DIAGNOSIS — I10 ESSENTIAL HYPERTENSION: Primary | ICD-10-CM

## 2022-11-30 PROCEDURE — 3078F DIAST BP <80 MM HG: CPT | Performed by: NURSE PRACTITIONER

## 2022-11-30 PROCEDURE — 99213 OFFICE O/P EST LOW 20 MIN: CPT | Performed by: NURSE PRACTITIONER

## 2022-11-30 PROCEDURE — 1123F ACP DISCUSS/DSCN MKR DOCD: CPT | Performed by: NURSE PRACTITIONER

## 2022-11-30 PROCEDURE — 3074F SYST BP LT 130 MM HG: CPT | Performed by: NURSE PRACTITIONER

## 2022-12-05 ASSESSMENT — ENCOUNTER SYMPTOMS
RESPIRATORY NEGATIVE: 1
GASTROINTESTINAL NEGATIVE: 1

## 2022-12-05 NOTE — PROGRESS NOTES
SUBJECTIVE:    Patient ID: Elizabeth Harrison is a 69 y.o. female. CC: Hypertension, dyslipidemia, subclinical hypothyroidism, B12 deficiency    HPI: The patient presents to the office today for follow-up of chronic medical conditions. Patient has a history of hypertension and dyslipidemia. She denies any chest pain or shortness of breath. She is managing this with diet and exercise. Patient has a history of subclinical hypothyroidism with TSH 5.1, T4 1.2. Patient is continue to follow-up with hematology. She is getting monthly B12 injections. She feels her fatigue is improving. Patient has a history of knee arthritis. She had a history of knee pain and previous x-ray showed moderate disease of the left knee. She currently reports bilateral knee pain now worse on the right. There has been no injury or trauma. She has worsening pain and stiffness in the morning and after exerting herself. She has tried over-the-counter remedies which help some. Past Medical History:   Diagnosis Date    Crohn's disease ([facility] [address]     Palpable purpura ([facility] [address]     Pancytopenia ([facility] [address] 3/3/2016    Pernicious anemia         Current Outpatient Medications   Medication Sig Dispense Refill    cyanocobalamin 1000 MCG/ML injection Inject 1,000 mcg into the muscle once      tiZANidine (ZANAFLEX) 2 MG tablet Take 1 tablet by mouth 3 times daily as needed (scitica) (Patient not taking: Reported on 11/30/2022) 30 tablet 0     No current facility-administered medications for this visit. Review of Systems   Constitutional: Negative. Respiratory: Negative. Cardiovascular: Negative. Gastrointestinal: Negative. Musculoskeletal:  Positive for arthralgias. Psychiatric/Behavioral: Negative. OBJECTIVE:  Physical Exam  Vitals reviewed. Constitutional:       General: She is not in acute distress. Appearance: She is well-developed. She is not diaphoretic. HENT:      Head: Normocephalic and atraumatic. Eyes:      General: No scleral icterus. Conjunctiva/sclera: Conjunctivae normal.   Neck:      Vascular: No JVD. Cardiovascular:      Rate and Rhythm: Normal rate and regular rhythm. Pulmonary:      Effort: Pulmonary effort is normal. No respiratory distress. Breath sounds: Normal breath sounds. No wheezing. Abdominal:      General: There is no distension. Palpations: Abdomen is soft. Tenderness: There is no abdominal tenderness. There is no guarding or rebound. Musculoskeletal:         General: Normal range of motion. Cervical back: Neck supple. Skin:     General: Skin is warm and dry. Neurological:      Mental Status: She is alert and oriented to person, place, and time. Psychiatric:         Behavior: Behavior normal.         Thought Content: Thought content normal.      /82   Pulse 60   Temp 97.2 °F (36.2 °C)   Ht 5' 3\" (1.6 m)   Wt 194 lb 6.4 oz (88.2 kg)   SpO2 97%   BMI 34.44 kg/m²      PHQ Scores 5/19/2022 4/4/2022 3/25/2022 7/27/2020 12/18/2019 6/12/2019 12/7/2018   PHQ2 Score 0 0 0 0 3 0 0   PHQ9 Score 0 0 0 0 6 0 0     Interpretation of Total Score Depression Severity: 1-4 = Minimal depression, 5-9 = Mild depression, 10-14 = Moderate depression, 15-19 = Moderately severe depression, 20-27 =Severe depression      Assessment/Plan:  Chantell was seen today for 6 month follow-up. Diagnoses and all orders for this visit:    Essential hypertension  - Normotensive  - she has met JNC standards.  - Continue current regimen.     Dyslipidemia  - Chronic, stable  - Continue current regimen    Subclinical hypothyroidism  - TSH mildly elevated with normal T4  - Monitor for worsening    Vitamin B12 deficiency anemia due to selective vitamin B12 malabsorption with proteinuria  - Chronic, stable  - Continue current regimen per hematology     Primary osteoarthritis of both knees  - Chronic, remains problematic  - Continue current regimen    Crohn's disease without complication, unspecified gastrointestinal tract location ([facility]  - Chronic, stable  - Continue current regimen        JAILENE Colby - CNP

## 2023-01-04 ENCOUNTER — OFFICE VISIT (OUTPATIENT)
Dept: INTERNAL MEDICINE CLINIC | Age: 68
End: 2023-01-04
Payer: MEDICARE

## 2023-01-04 VITALS
WEIGHT: 191 LBS | BODY MASS INDEX: 33.84 KG/M2 | HEIGHT: 63 IN | DIASTOLIC BLOOD PRESSURE: 80 MMHG | SYSTOLIC BLOOD PRESSURE: 138 MMHG | HEART RATE: 83 BPM | OXYGEN SATURATION: 97 %

## 2023-01-04 DIAGNOSIS — H25.13 AGE-RELATED NUCLEAR CATARACT OF BOTH EYES: ICD-10-CM

## 2023-01-04 DIAGNOSIS — Z01.818 PREOP EXAMINATION: Primary | ICD-10-CM

## 2023-01-04 PROCEDURE — 3079F DIAST BP 80-89 MM HG: CPT | Performed by: NURSE PRACTITIONER

## 2023-01-04 PROCEDURE — 1123F ACP DISCUSS/DSCN MKR DOCD: CPT | Performed by: NURSE PRACTITIONER

## 2023-01-04 PROCEDURE — 3075F SYST BP GE 130 - 139MM HG: CPT | Performed by: NURSE PRACTITIONER

## 2023-01-04 PROCEDURE — 99214 OFFICE O/P EST MOD 30 MIN: CPT | Performed by: NURSE PRACTITIONER

## 2023-01-04 ASSESSMENT — PATIENT HEALTH QUESTIONNAIRE - PHQ9
SUM OF ALL RESPONSES TO PHQ QUESTIONS 1-9: 0
1. LITTLE INTEREST OR PLEASURE IN DOING THINGS: 0
SUM OF ALL RESPONSES TO PHQ QUESTIONS 1-9: 0
2. FEELING DOWN, DEPRESSED OR HOPELESS: 0
SUM OF ALL RESPONSES TO PHQ9 QUESTIONS 1 & 2: 0

## 2023-01-04 NOTE — PROGRESS NOTES
Preoperative Consultation      Jair Khan  YOB: 1955    Date of Service:  2023    This patient presents today at the request of the surgeon for a preoperative consultation. Surgeon: Dr. Ruth Bales  Indication for surgery: Bilateral cataract  Scheduled procedure: Bilateral cataract  Date of surgery: 1/10/23 (left), 23 (right)  Location of surgery: [facility]  Indicated/required preoperative testing: H&P  Smoker: No  Previous anesthesia complications: No    Family history of anesthesia complications: No  Loose, capped or false teeth: No  Recent chest pain or SOB: No  Known Bleeding Risk: No recent or remote history of abnormal bleeding  Personal or FH of DVT/PE: No    Patient objection to receiving blood products: No      No Known Allergies      Current Outpatient Medications   Medication Sig Dispense Refill    cyanocobalamin 1000 MCG/ML injection Inject 1,000 mcg into the muscle once      tiZANidine (ZANAFLEX) 2 MG tablet Take 1 tablet by mouth 3 times daily as needed (scitica) (Patient not taking: Reported on 2022) 30 tablet 0     No current facility-administered medications for this visit. Patient Active Problem List   Diagnosis    Vitamin B12 deficiency anemia due to selective vitamin B12 malabsorption with proteinuria    Folate deficiency    Vitamin D deficiency    Crohn's disease, unspecified, without complications ([address]    Subclinical hypothyroidism    Primary osteoarthritis of both knees    Essential hypertension    Dyslipidemia       Past Medical History:   Diagnosis Date    Crohn's disease ([address]     Palpable purpura ([address]     Pancytopenia ([address] 3/3/2016    Pernicious anemia        Past Surgical History:   Procedure Laterality Date    ABDOMEN SURGERY      Bowel Obstruction w resection    CARDIAC CATHETERIZATION      uc     SECTION      INNER EAR SURGERY         No family history on file.       Review of Systems  A comprehensive review of systems was negative except for what was noted in the HPI. Physical Exam   Vitals:    01/04/23 1014 01/04/23 1038   BP: (!) 158/80 138/80   Pulse: 83    SpO2: 97%    Weight: 191 lb (86.6 kg)    Height: 5' 3\" (1.6 m)         Constitutional: She is oriented to person, place, and time. She appears well-developed and well-nourished. No distress. HENT:   Head: Normocephalic and atraumatic. Mouth/Throat: Uvula is midline, oropharynx is clear and moist and mucous membranes are normal.   Eyes: Conjunctivae and EOM are normal.   Neck: Trachea normal and normal range of motion. Neck supple. Cardiovascular: Normal rate, regular rhythm, normal heart sounds and intact distal pulses. Pulmonary/Chest: Effort normal and breath sounds normal. No respiratory distress. She has no wheezes. She has no rales. Abdominal: Soft, non-tender. Bowel sounds are normal.   Musculoskeletal: She exhibits no edema and no tenderness. Neurological: She is alert and oriented to person, place, and time. Skin: Skin is warm and dry. No rash noted. No erythema. Psychiatric: She has a normal mood and affect. Her behavior is normal.       EKG Interpretation: n/a  Lab Review:  Lab Results   Component Value Date/Time     12/22/2021 06:18 AM    K 4.2 12/22/2021 06:18 AM     12/22/2021 06:18 AM    CO2 24 12/22/2021 06:18 AM    BUN 9 12/22/2021 06:18 AM    CREATININE 0.6 12/22/2021 06:18 AM    GLUCOSE 100 12/22/2021 06:18 AM    CALCIUM 8.8 12/22/2021 06:18 AM     Lab Results   Component Value Date/Time    WBC 5.9 12/22/2021 06:18 AM    HGB 9.3 12/22/2021 06:18 AM    HCT 28.0 12/22/2021 06:18 AM    .0 12/22/2021 06:18 AM     12/22/2021 06:18 AM         Assessment:     69 y.o. patient with planned surgery as above. Known risk factors for perioperative complications: None     Plan:     1. Preoperative workup as follows: none  2. Change in medication regimen before surgery: None  3.  Prophylaxis for cardiac events with perioperative beta-blockers: Not indicated  4. Deep vein thrombosis prophylaxis: regimen to be chosen by surgical team  5. No contraindications to planned surgery.       [facility], [address] [place] [address],[address] Internal Medicine and Rheumatology  [phone]

## 2023-05-31 ENCOUNTER — OFFICE VISIT (OUTPATIENT)
Dept: INTERNAL MEDICINE CLINIC | Age: 68
End: 2023-05-31
Payer: MEDICARE

## 2023-05-31 VITALS
OXYGEN SATURATION: 94 % | BODY MASS INDEX: 33.84 KG/M2 | SYSTOLIC BLOOD PRESSURE: 141 MMHG | HEART RATE: 57 BPM | WEIGHT: 191 LBS | DIASTOLIC BLOOD PRESSURE: 78 MMHG | HEIGHT: 63 IN

## 2023-05-31 DIAGNOSIS — E53.8 FOLATE DEFICIENCY: ICD-10-CM

## 2023-05-31 DIAGNOSIS — E03.8 SUBCLINICAL HYPOTHYROIDISM: ICD-10-CM

## 2023-05-31 DIAGNOSIS — K50.90 CROHN'S DISEASE WITHOUT COMPLICATION, UNSPECIFIED GASTROINTESTINAL TRACT LOCATION (HCC): ICD-10-CM

## 2023-05-31 DIAGNOSIS — I10 ESSENTIAL HYPERTENSION: ICD-10-CM

## 2023-05-31 DIAGNOSIS — E78.5 DYSLIPIDEMIA: ICD-10-CM

## 2023-05-31 DIAGNOSIS — D53.9 MACROCYTIC ANEMIA: ICD-10-CM

## 2023-05-31 DIAGNOSIS — Z00.00 MEDICARE ANNUAL WELLNESS VISIT, SUBSEQUENT: Primary | ICD-10-CM

## 2023-05-31 DIAGNOSIS — E55.9 VITAMIN D DEFICIENCY: ICD-10-CM

## 2023-05-31 DIAGNOSIS — R41.3 MEMORY CHANGE: ICD-10-CM

## 2023-05-31 DIAGNOSIS — D51.1 VITAMIN B12 DEFICIENCY ANEMIA DUE TO SELECTIVE VITAMIN B12 MALABSORPTION WITH PROTEINURIA: ICD-10-CM

## 2023-05-31 LAB
25(OH)D3 SERPL-MCNC: 19.2 NG/ML
ALBUMIN SERPL-MCNC: 4.4 G/DL (ref 3.4–5)
ALBUMIN/GLOB SERPL: 1.4 {RATIO} (ref 1.1–2.2)
ALP SERPL-CCNC: 107 U/L (ref 40–129)
ALT SERPL-CCNC: 9 U/L (ref 10–40)
ANION GAP SERPL CALCULATED.3IONS-SCNC: 14 MMOL/L (ref 3–16)
AST SERPL-CCNC: 15 U/L (ref 15–37)
BASOPHILS # BLD: 0 K/UL (ref 0–0.2)
BASOPHILS NFR BLD: 0.2 %
BILIRUB SERPL-MCNC: 0.5 MG/DL (ref 0–1)
BUN SERPL-MCNC: 10 MG/DL (ref 7–20)
CALCIUM SERPL-MCNC: 8.8 MG/DL (ref 8.3–10.6)
CHLORIDE SERPL-SCNC: 104 MMOL/L (ref 99–110)
CHOLEST SERPL-MCNC: 180 MG/DL (ref 0–199)
CO2 SERPL-SCNC: 23 MMOL/L (ref 21–32)
CREAT SERPL-MCNC: 0.7 MG/DL (ref 0.6–1.2)
DEPRECATED RDW RBC AUTO: 13.8 % (ref 12.4–15.4)
EOSINOPHIL # BLD: 0 K/UL (ref 0–0.6)
EOSINOPHIL NFR BLD: 0 %
FERRITIN SERPL IA-MCNC: 64.7 NG/ML (ref 15–150)
FOLATE SERPL-MCNC: 4.14 NG/ML (ref 4.78–24.2)
GFR SERPLBLD CREATININE-BSD FMLA CKD-EPI: >60 ML/MIN/{1.73_M2}
GLUCOSE SERPL-MCNC: 96 MG/DL (ref 70–99)
HCT VFR BLD AUTO: 40.8 % (ref 36–48)
HDLC SERPL-MCNC: 42 MG/DL (ref 40–60)
HGB BLD-MCNC: 13.6 G/DL (ref 12–16)
IRON SATN MFR SERPL: 20 % (ref 15–50)
IRON SERPL-MCNC: 63 UG/DL (ref 37–145)
LDLC SERPL CALC-MCNC: 105 MG/DL
LYMPHOCYTES # BLD: 1.4 K/UL (ref 1–5.1)
LYMPHOCYTES NFR BLD: 29.2 %
MCH RBC QN AUTO: 27.9 PG (ref 26–34)
MCHC RBC AUTO-ENTMCNC: 33.4 G/DL (ref 31–36)
MCV RBC AUTO: 83.6 FL (ref 80–100)
MONOCYTES # BLD: 0.3 K/UL (ref 0–1.3)
MONOCYTES NFR BLD: 6.6 %
NEUTROPHILS # BLD: 3.2 K/UL (ref 1.7–7.7)
NEUTROPHILS NFR BLD: 64 %
PLATELET # BLD AUTO: 133 K/UL (ref 135–450)
PMV BLD AUTO: 9.6 FL (ref 5–10.5)
POTASSIUM SERPL-SCNC: 4.2 MMOL/L (ref 3.5–5.1)
PROT SERPL-MCNC: 7.5 G/DL (ref 6.4–8.2)
RBC # BLD AUTO: 4.88 M/UL (ref 4–5.2)
SODIUM SERPL-SCNC: 141 MMOL/L (ref 136–145)
T4 FREE SERPL-MCNC: 1.2 NG/DL (ref 0.9–1.8)
TIBC SERPL-MCNC: 320 UG/DL (ref 260–445)
TRIGL SERPL-MCNC: 165 MG/DL (ref 0–150)
TSH SERPL DL<=0.005 MIU/L-ACNC: 4.83 UIU/ML (ref 0.27–4.2)
VIT B12 SERPL-MCNC: 351 PG/ML (ref 211–911)
VLDLC SERPL CALC-MCNC: 33 MG/DL
WBC # BLD AUTO: 4.9 K/UL (ref 4–11)

## 2023-05-31 PROCEDURE — 3078F DIAST BP <80 MM HG: CPT | Performed by: NURSE PRACTITIONER

## 2023-05-31 PROCEDURE — 1123F ACP DISCUSS/DSCN MKR DOCD: CPT | Performed by: NURSE PRACTITIONER

## 2023-05-31 PROCEDURE — 3077F SYST BP >= 140 MM HG: CPT | Performed by: NURSE PRACTITIONER

## 2023-05-31 PROCEDURE — 99214 OFFICE O/P EST MOD 30 MIN: CPT | Performed by: NURSE PRACTITIONER

## 2023-05-31 PROCEDURE — G0439 PPPS, SUBSEQ VISIT: HCPCS | Performed by: NURSE PRACTITIONER

## 2023-05-31 RX ORDER — LISINOPRIL 20 MG/1
20 TABLET ORAL DAILY
Qty: 30 TABLET | Refills: 5 | Status: SHIPPED | OUTPATIENT
Start: 2023-05-31

## 2023-05-31 SDOH — ECONOMIC STABILITY: FOOD INSECURITY: WITHIN THE PAST 12 MONTHS, THE FOOD YOU BOUGHT JUST DIDN'T LAST AND YOU DIDN'T HAVE MONEY TO GET MORE.: NEVER TRUE

## 2023-05-31 SDOH — ECONOMIC STABILITY: HOUSING INSECURITY
IN THE LAST 12 MONTHS, WAS THERE A TIME WHEN YOU DID NOT HAVE A STEADY PLACE TO SLEEP OR SLEPT IN A SHELTER (INCLUDING NOW)?: NO

## 2023-05-31 SDOH — ECONOMIC STABILITY: INCOME INSECURITY: HOW HARD IS IT FOR YOU TO PAY FOR THE VERY BASICS LIKE FOOD, HOUSING, MEDICAL CARE, AND HEATING?: NOT HARD AT ALL

## 2023-05-31 SDOH — ECONOMIC STABILITY: FOOD INSECURITY: WITHIN THE PAST 12 MONTHS, YOU WORRIED THAT YOUR FOOD WOULD RUN OUT BEFORE YOU GOT MONEY TO BUY MORE.: NEVER TRUE

## 2023-05-31 ASSESSMENT — PATIENT HEALTH QUESTIONNAIRE - PHQ9
SUM OF ALL RESPONSES TO PHQ9 QUESTIONS 1 & 2: 1
SUM OF ALL RESPONSES TO PHQ QUESTIONS 1-9: 1
1. LITTLE INTEREST OR PLEASURE IN DOING THINGS: 0
SUM OF ALL RESPONSES TO PHQ QUESTIONS 1-9: 1
2. FEELING DOWN, DEPRESSED OR HOPELESS: 1
SUM OF ALL RESPONSES TO PHQ QUESTIONS 1-9: 1
SUM OF ALL RESPONSES TO PHQ QUESTIONS 1-9: 1

## 2023-05-31 ASSESSMENT — LIFESTYLE VARIABLES: HOW OFTEN DO YOU HAVE A DRINK CONTAINING ALCOHOL: NEVER

## 2023-05-31 ASSESSMENT — ENCOUNTER SYMPTOMS
RESPIRATORY NEGATIVE: 1
GASTROINTESTINAL NEGATIVE: 1

## 2023-05-31 NOTE — PROGRESS NOTES
Medicare Annual Wellness Visit    [address] is here for Medicare AWV and Memory Loss    Assessment & Plan     Medicare annual wellness visit, subsequent    Memory change  - Unstable problem  - Screening as part of AWV word recall 1/3  - Anxiety maybe contributing.   - Offered SLUMS testing here, neuro referral, referral to  memory clinic. - Check labs  -     Comprehensive Metabolic Panel; Future  -     TSH with Reflex; Future  -     CBC with Auto Differential; Future  -     Iron and TIBC; Future  -     Ferritin; Future  -     Vitamin B12 & Folate; Future  -     Vitamin D 25 Hydroxy; Future    Essential hypertension  - Not at goal  - BP borderline which not new  - Discussed treatment and she is amenable  - Start lisinopril QD  -     lisinopril (PRINIVIL;ZESTRIL) 20 MG tablet; Take 1 tablet by mouth daily, Disp-30 tablet, R-5Normal  -     Comprehensive Metabolic Panel; Future  -     LIPID PANEL; Future    Dyslipidemia  - Chronic, stable  - Continue current regimen  -     Comprehensive Metabolic Panel; Future  -     LIPID PANEL; Future    Crohn's disease without complication, unspecified gastrointestinal tract location ([facility] [address]  - Chronic, stable  - Continue current regimen  -     Comprehensive Metabolic Panel; Future  -     CBC with Auto Differential; Future    Subclinical hypothyroidism  -     TSH with Reflex; Future    Macrocytic anemia  -     CBC with Auto Differential; Future  -     Iron and TIBC; Future  -     Ferritin; Future    Vitamin B12 deficiency anemia due to selective vitamin B12 malabsorption with proteinuria  -     Vitamin B12 & Folate; Future    Folate deficiency  -     Vitamin B12 & Folate; Future    Vitamin D deficiency  -     Vitamin D 25 Hydroxy;  Future      Recommendations for Preventive Services Due: see orders and patient instructions/AVS.  Recommended screening schedule for the next 5-10 years is provided to the patient in written form: see Patient Instructions/AVS.     No follow-ups on

## 2023-05-31 NOTE — PATIENT INSTRUCTIONS
and no harm is done. But if stress happens too often or lasts too long, it can have bad effects. Long-term stress can make you more likely to get sick, and it can make symptoms of some diseases worse. If you tense up when you are stressed, you may develop neck, shoulder, or low back pain. Stress is linked to high blood pressure and heart disease. Stress also harms your emotional health. It can make you norris, tense, or depressed. Your relationships may suffer, and you may not do well at work or school. What can you do to manage stress? You can try these things to help manage stress:   Do something active. Exercise or activity can help reduce stress. Walking is a great way to get started. Even everyday activities such as housecleaning or yard work can help. Try yoga or penny chi. These techniques combine exercise and meditation. You may need some training at first to learn them. Do something you enjoy. For example, listen to music or go to a movie. Practice your hobby or do volunteer work. Meditate. This can help you relax, because you are not worrying about what happened before or what may happen in the future. Do guided imagery. Imagine yourself in any setting that helps you feel calm. You can use online videos, books, or a teacher to guide you. Do breathing exercises. For example:  From a standing position, bend forward from the waist with your knees slightly bent. Let your arms dangle close to the floor. Breathe in slowly and deeply as you return to a standing position. Roll up slowly and lift your head last.  Hold your breath for just a few seconds in the standing position. Breathe out slowly and bend forward from the waist.  Let your feelings out. Talk, laugh, cry, and express anger when you need to. Talking with supportive friends or family, a counselor, or a netta leader about your feelings is a healthy way to relieve stress. Avoid discussing your feelings with people who make you feel worse. Write.

## 2023-06-02 DIAGNOSIS — D69.6 THROMBOCYTOPENIA (HCC): ICD-10-CM

## 2023-06-02 DIAGNOSIS — E55.9 VITAMIN D DEFICIENCY: ICD-10-CM

## 2023-06-02 DIAGNOSIS — E53.8 FOLATE DEFICIENCY: Primary | ICD-10-CM

## 2023-06-02 RX ORDER — LANOLIN ALCOHOL/MO/W.PET/CERES
400 CREAM (GRAM) TOPICAL DAILY
Qty: 90 TABLET | Refills: 3 | Status: SHIPPED | OUTPATIENT
Start: 2023-06-02

## 2023-06-28 ENCOUNTER — OFFICE VISIT (OUTPATIENT)
Dept: INTERNAL MEDICINE CLINIC | Age: 68
End: 2023-06-28
Payer: MEDICARE

## 2023-06-28 VITALS
HEART RATE: 56 BPM | HEIGHT: 63 IN | OXYGEN SATURATION: 98 % | WEIGHT: 190 LBS | BODY MASS INDEX: 33.66 KG/M2 | DIASTOLIC BLOOD PRESSURE: 70 MMHG | SYSTOLIC BLOOD PRESSURE: 112 MMHG

## 2023-06-28 DIAGNOSIS — Z86.69 HISTORY OF OBSTRUCTIVE SLEEP APNEA: ICD-10-CM

## 2023-06-28 DIAGNOSIS — E53.8 FOLATE DEFICIENCY: ICD-10-CM

## 2023-06-28 DIAGNOSIS — E03.8 SUBCLINICAL HYPOTHYROIDISM: ICD-10-CM

## 2023-06-28 DIAGNOSIS — E55.9 VITAMIN D DEFICIENCY: ICD-10-CM

## 2023-06-28 DIAGNOSIS — E78.5 DYSLIPIDEMIA: ICD-10-CM

## 2023-06-28 DIAGNOSIS — I10 ESSENTIAL HYPERTENSION: ICD-10-CM

## 2023-06-28 DIAGNOSIS — G31.84 MCI (MILD COGNITIVE IMPAIRMENT): Primary | ICD-10-CM

## 2023-06-28 PROCEDURE — 1123F ACP DISCUSS/DSCN MKR DOCD: CPT | Performed by: NURSE PRACTITIONER

## 2023-06-28 PROCEDURE — 3074F SYST BP LT 130 MM HG: CPT | Performed by: NURSE PRACTITIONER

## 2023-06-28 PROCEDURE — 99214 OFFICE O/P EST MOD 30 MIN: CPT | Performed by: NURSE PRACTITIONER

## 2023-06-28 PROCEDURE — 3078F DIAST BP <80 MM HG: CPT | Performed by: NURSE PRACTITIONER

## 2023-06-28 RX ORDER — ATORVASTATIN CALCIUM 20 MG/1
20 TABLET, FILM COATED ORAL NIGHTLY
Qty: 90 TABLET | Refills: 3 | Status: SHIPPED | OUTPATIENT
Start: 2023-06-28

## 2023-06-28 ASSESSMENT — ENCOUNTER SYMPTOMS
RESPIRATORY NEGATIVE: 1
GASTROINTESTINAL NEGATIVE: 1

## 2023-08-16 ENCOUNTER — OFFICE VISIT (OUTPATIENT)
Dept: PULMONOLOGY | Age: 68
End: 2023-08-16
Payer: MEDICARE

## 2023-08-16 VITALS
WEIGHT: 192 LBS | OXYGEN SATURATION: 98 % | HEART RATE: 65 BPM | SYSTOLIC BLOOD PRESSURE: 98 MMHG | HEIGHT: 63 IN | DIASTOLIC BLOOD PRESSURE: 72 MMHG | BODY MASS INDEX: 34.02 KG/M2

## 2023-08-16 DIAGNOSIS — E66.9 OBESITY (BMI 30-39.9): ICD-10-CM

## 2023-08-16 DIAGNOSIS — G47.10 HYPERSOMNIA: ICD-10-CM

## 2023-08-16 DIAGNOSIS — G47.00 INSOMNIA, UNSPECIFIED TYPE: ICD-10-CM

## 2023-08-16 DIAGNOSIS — G47.33 OSA (OBSTRUCTIVE SLEEP APNEA): Primary | ICD-10-CM

## 2023-08-16 DIAGNOSIS — R06.83 SNORING: ICD-10-CM

## 2023-08-16 PROCEDURE — 99204 OFFICE O/P NEW MOD 45 MIN: CPT | Performed by: STUDENT IN AN ORGANIZED HEALTH CARE EDUCATION/TRAINING PROGRAM

## 2023-08-16 PROCEDURE — 3078F DIAST BP <80 MM HG: CPT | Performed by: STUDENT IN AN ORGANIZED HEALTH CARE EDUCATION/TRAINING PROGRAM

## 2023-08-16 PROCEDURE — 3074F SYST BP LT 130 MM HG: CPT | Performed by: STUDENT IN AN ORGANIZED HEALTH CARE EDUCATION/TRAINING PROGRAM

## 2023-08-16 ASSESSMENT — SLEEP AND FATIGUE QUESTIONNAIRES
NECK CIRCUMFERENCE (INCHES): 16
HOW LIKELY ARE YOU TO NOD OFF OR FALL ASLEEP IN A CAR, WHILE STOPPED FOR A FEW MINUTES IN TRAFFIC: 0
HOW LIKELY ARE YOU TO NOD OFF OR FALL ASLEEP WHILE SITTING INACTIVE IN A PUBLIC PLACE: 0
HOW LIKELY ARE YOU TO NOD OFF OR FALL ASLEEP WHEN YOU ARE A PASSENGER IN A CAR FOR AN HOUR WITHOUT A BREAK: 2
HOW LIKELY ARE YOU TO NOD OFF OR FALL ASLEEP WHILE SITTING AND TALKING TO SOMEONE: 0
HOW LIKELY ARE YOU TO NOD OFF OR FALL ASLEEP WHILE LYING DOWN TO REST IN THE AFTERNOON WHEN CIRCUMSTANCES PERMIT: 0
HOW LIKELY ARE YOU TO NOD OFF OR FALL ASLEEP WHILE SITTING QUIETLY AFTER LUNCH WITHOUT ALCOHOL: 0
HOW LIKELY ARE YOU TO NOD OFF OR FALL ASLEEP WHILE WATCHING TV: 3
ESS TOTAL SCORE: 7
HOW LIKELY ARE YOU TO NOD OFF OR FALL ASLEEP WHILE SITTING AND READING: 2

## 2023-08-16 NOTE — PROGRESS NOTES
[facility]  Sleep Medicine  [address] [facility], [address] [facility], [facility] [facility]    Chief Complaint   Patient presents with    Snoring    Fatigue       Payton Dewey is a 69 y.o. female who comes in for sleep evaluation. Her complaints include disrupted sleep/frequent nocturnal awakenings. Onset of symptoms was several years ago. Patient was diagnosed with NIKKO about 10 years ago. She was treated with CPAP but she could not tolerated and stopped using it. Pertinent PMHx includes: hx of NIKKO, hypertension, HLD, low vitamin D and low vitamin B12. She goes to bed at 10pm. She falls asleep in variable durations, most times right away. She awakens at 8am.    She awakens several times, usually between 3am and 8am (she does not have a good quality sleep during this period; patient reports that she used to work early and had to get up at 3am back then). She does not use medication for sleep. She does not take daytime naps usually. She describes the symptoms as daytime sleepiness, fatigue, disrupted sleep, difficulty staying asleep, and waking up too early. She denies recent significant weight gain . She has not dozed off while driving. She does not have symptoms that fulfill the criteria for restless legs syndrome. Previous evaluation and treatment has included: PSG and PAP therapy.     Caffeinated drinks/day: 3 drinks of coke per day, occasional coffee  Alcohol intake/day/week: none  Occupation: retired      DATA REVIEWED TODAY:  [place] & Insomnia Severity Index scores  Sleep Medicine 8/16/2023   Sitting and reading 2   Watching TV 3   Sitting, inactive in a public place (e.g. a theatre or a meeting) 0   As a passenger in a car for an hour without a break 2   Lying down to rest in the afternoon when circumstances permit 0   Sitting and talking to someone 0   Sitting quietly after a lunch without alcohol 0   In a car, while stopped for a few minutes in traffic 0   [place] Sleepiness Score 7   Neck

## 2023-08-16 NOTE — PATIENT INSTRUCTIONS
you plan a long drive the next day. Get to bed early and do not stay up late packing. 4. Avoid alcohol both the night before your trip and the during your trip. Alcohol will disrupt sleep and make you more tired the next day. Sleepiness and alcohol are additive in increasing impairment of your driving ability. 5. Avoid any sedative medications, including sedative antihistamines that are often contained in cold or allergy medications, the night before you drive as they may have long lasting effects the next day. 6. Travel during non-sleeping hours. Accidents due to sleepiness are more common during the nighttime hours. 7. If sleepy, stop and rest.  Drink coffee, walk around or have a brief nap in your car if you are sleepy. Have a 10-15 minute break after every 2 hours of driving. 8. Drive with a . Share the driving. Relax in the back seat until it is your time to share the driving again.
Clear bilaterally, pupils equal, round and reactive to light.

## 2023-10-10 DIAGNOSIS — I10 ESSENTIAL HYPERTENSION: ICD-10-CM

## 2023-10-10 RX ORDER — LISINOPRIL 20 MG/1
20 TABLET ORAL DAILY
Qty: 90 TABLET | Refills: 3 | Status: SHIPPED | OUTPATIENT
Start: 2023-10-10

## 2024-02-12 ENCOUNTER — OFFICE VISIT (OUTPATIENT)
Dept: INTERNAL MEDICINE CLINIC | Age: 69
End: 2024-02-12
Payer: MEDICARE

## 2024-02-12 VITALS
HEIGHT: 63 IN | SYSTOLIC BLOOD PRESSURE: 110 MMHG | WEIGHT: 195.4 LBS | OXYGEN SATURATION: 98 % | DIASTOLIC BLOOD PRESSURE: 78 MMHG | BODY MASS INDEX: 34.62 KG/M2 | HEART RATE: 72 BPM

## 2024-02-12 DIAGNOSIS — K50.90 CROHN'S DISEASE WITHOUT COMPLICATION, UNSPECIFIED GASTROINTESTINAL TRACT LOCATION (HCC): ICD-10-CM

## 2024-02-12 DIAGNOSIS — D69.6 THROMBOCYTOPENIA, UNSPECIFIED (HCC): ICD-10-CM

## 2024-02-12 DIAGNOSIS — I10 ESSENTIAL HYPERTENSION: ICD-10-CM

## 2024-02-12 DIAGNOSIS — M17.0 BILATERAL PRIMARY OSTEOARTHRITIS OF KNEE: Primary | ICD-10-CM

## 2024-02-12 PROCEDURE — 3078F DIAST BP <80 MM HG: CPT | Performed by: NURSE PRACTITIONER

## 2024-02-12 PROCEDURE — 1123F ACP DISCUSS/DSCN MKR DOCD: CPT | Performed by: NURSE PRACTITIONER

## 2024-02-12 PROCEDURE — 99214 OFFICE O/P EST MOD 30 MIN: CPT | Performed by: NURSE PRACTITIONER

## 2024-02-12 PROCEDURE — 3074F SYST BP LT 130 MM HG: CPT | Performed by: NURSE PRACTITIONER

## 2024-02-12 ASSESSMENT — PATIENT HEALTH QUESTIONNAIRE - PHQ9: DEPRESSION UNABLE TO ASSESS: PT REFUSES

## 2024-02-12 NOTE — PROGRESS NOTES
Rhythm: Normal rate and regular rhythm.   Pulmonary:      Effort: Pulmonary effort is normal. No respiratory distress.      Breath sounds: No wheezing, rhonchi or rales.   Skin:     General: Skin is warm and dry.   Neurological:      General: No focal deficit present.      Mental Status: She is alert and oriented to person, place, and time.   Psychiatric:         Mood and Affect: Mood normal.         Behavior: Behavior normal.        /78   Pulse 72   Ht 1.6 m (5' 3\")   Wt 88.6 kg (195 lb 6.4 oz)   SpO2 98%   BMI 34.61 kg/m²          5/31/2023     7:35 AM 1/4/2023    10:20 AM 5/19/2022     8:45 AM 4/4/2022     4:17 PM 3/25/2022     2:29 PM 7/27/2020     2:53 PM 12/18/2019     8:11 AM   PHQ Scores   PHQ2 Score 1 0 0 0 0 0 3   PHQ9 Score 1 0 0 0 0 0 6     Interpretation of Total Score Depression Severity: 1-4 = Minimal depression, 5-9 = Mild depression, 10-14 = Moderate depression, 15-19 = Moderately severe depression, 20-27 =Severe depression        ASSESSMENT/PLAN:  Chantell was seen today for follow-up.  Diagnoses and all orders for this visit:    Bilateral primary osteoarthritis of knee  - Acute on chronic pain, bilateral  - No new injury or trauma  - We discussed options including knee x-ray, joint injection, oral treatments.  - Patient would like to discuss with orthopedic surgery her options.  -     Corby Bass MD, Orthopedic Surgery (Hip; Knee), [facility]    Essential hypertension  - Normotensive  - she has met JNC standards.  - Continue current regimen.    Thrombocytopenia, unspecified (HCC)  - New problem  - Plt 133, mildly low  - Will need recheck with labs, smear.  Ordered but not completed.    Crohn's disease without complication, unspecified gastrointestinal tract location (HCC)  - Chronic, stable  - Continue current regimen      JAILENE Das - CNP

## 2024-02-19 ASSESSMENT — ENCOUNTER SYMPTOMS
RESPIRATORY NEGATIVE: 1
GASTROINTESTINAL NEGATIVE: 1

## 2024-02-22 ENCOUNTER — OFFICE VISIT (OUTPATIENT)
Dept: ORTHOPEDIC SURGERY | Age: 69
End: 2024-02-22

## 2024-02-22 VITALS — BODY MASS INDEX: 34.73 KG/M2 | WEIGHT: 196 LBS | HEIGHT: 63 IN

## 2024-02-22 DIAGNOSIS — M25.561 PAIN IN BOTH KNEES, UNSPECIFIED CHRONICITY: Primary | ICD-10-CM

## 2024-02-22 DIAGNOSIS — M25.562 PAIN IN BOTH KNEES, UNSPECIFIED CHRONICITY: Primary | ICD-10-CM

## 2024-02-22 RX ORDER — LIDOCAINE HYDROCHLORIDE 10 MG/ML
4 INJECTION, SOLUTION INFILTRATION; PERINEURAL ONCE
Status: COMPLETED | OUTPATIENT
Start: 2024-02-22 | End: 2024-02-22

## 2024-02-22 RX ORDER — TRIAMCINOLONE ACETONIDE 40 MG/ML
40 INJECTION, SUSPENSION INTRA-ARTICULAR; INTRAMUSCULAR ONCE
Status: COMPLETED | OUTPATIENT
Start: 2024-02-22 | End: 2024-02-22

## 2024-02-22 RX ADMIN — LIDOCAINE HYDROCHLORIDE 4 ML: 10 INJECTION, SOLUTION INFILTRATION; PERINEURAL at 16:08

## 2024-02-22 RX ADMIN — TRIAMCINOLONE ACETONIDE 40 MG: 40 INJECTION, SUSPENSION INTRA-ARTICULAR; INTRAMUSCULAR at 16:09

## 2024-02-24 NOTE — PROGRESS NOTES
Patient: Chantell Velasquez  : 1955    MRN: [id number]    Date of Visit: 24    Attending Physician: Corby Dias    History of Present Illness  Ms.. Velasquez is a very pleasant 68 y.o. patient with a several year history of progressive BILATERAL knee pain. There is no precipitating event or trauma. The pain is located predominantly in the medial and anterior aspect of the knee aggravated by weight bearing. Walking even short distances can be painful. Stair climbing is progressing becoming more difficult and painful. However, it can also awaken the patient at night. She has tried the following interventions without sustained functional improvement:    Low-impact, structured therapy/exercise program  NSAID's/Tylenol Arthritis strength    PMH/PSH:  Past Medical History:   Diagnosis Date    Crohn's disease (HCC)     Palpable purpura (HCC)     Pancytopenia (HCC) 3/3/2016    Pernicious anemia      Patient Active Problem List   Diagnosis    Vitamin B12 deficiency anemia due to selective vitamin B12 malabsorption with proteinuria    Folate deficiency    Vitamin D deficiency    Crohn's disease, unspecified, without complications (HCC)    Subclinical hypothyroidism    Primary osteoarthritis of both knees    Essential hypertension    Dyslipidemia    Thrombocytopenia, unspecified (HCC)     Past Surgical History:   Procedure Laterality Date    ABDOMEN SURGERY      Bowel Obstruction w resection    CARDIAC CATHETERIZATION      uc     SECTION      INNER EAR SURGERY             MEDS:  Scheduled Meds:  Continuous Infusions:  PRN Meds:  Current Meds:  Current Outpatient Medications:     lisinopril (PRINIVIL;ZESTRIL) 20 MG tablet, TAKE 1 TABLET BY MOUTH DAILY, Disp: 90 tablet, Rfl: 3    atorvastatin (LIPITOR) 20 MG tablet, Take 1 tablet by mouth at bedtime, Disp: 90 tablet, Rfl: 3    folic acid (FOLVITE) 400 MCG tablet, Take 1 tablet by mouth daily, Disp: 90 tablet, Rfl: 3    vitamin D (CHOLECALCIFEROL) 25

## 2024-03-07 ENCOUNTER — HOSPITAL ENCOUNTER (OUTPATIENT)
Dept: PHYSICAL THERAPY | Age: 69
Setting detail: THERAPIES SERIES
Discharge: HOME | End: 2024-03-07
Payer: MEDICARE

## 2024-03-07 DIAGNOSIS — R29.898 DECREASED STRENGTH OF LOWER EXTREMITY: ICD-10-CM

## 2024-03-07 DIAGNOSIS — R26.9 GAIT ABNORMALITY: ICD-10-CM

## 2024-03-07 DIAGNOSIS — R26.89 IMPAIRMENT OF BALANCE: ICD-10-CM

## 2024-03-07 DIAGNOSIS — M25.661 DECREASED RANGE OF MOTION (ROM) OF RIGHT KNEE: ICD-10-CM

## 2024-03-07 DIAGNOSIS — M25.662 DECREASED RANGE OF MOTION (ROM) OF LEFT KNEE: Primary | ICD-10-CM

## 2024-03-07 PROCEDURE — 97530 THERAPEUTIC ACTIVITIES: CPT

## 2024-03-07 PROCEDURE — 97161 PT EVAL LOW COMPLEX 20 MIN: CPT

## 2024-03-07 PROCEDURE — 97110 THERAPEUTIC EXERCISES: CPT

## 2024-03-07 NOTE — PLAN OF CARE
the functional impairments and activity limitations listed below and would benefit from Outpatient PT to address the below impairments as well as improve pain, and restore function.     Functional Impairments:   Noted lumbar/proximal hip/LE joint hypomobility  Decreased LE functional ROM  Decreased core/proximal hip strength and neuromuscular control  Decreased LE functional strength     Functional Activity Limitations (from functional questionnaire and intake):  Reduced ability to tolerate prolonged functional positions  Reduced ability or difficulty with changes of positions or transfers between positions  Reduced ability to maintain good posture and demonstrate good body mechanics with sitting, bending, and lifting  Reduced ability to sleep  Reduced ability to squat  Reduced ability to ambulate prolonged functional periods/distances/surfaces  Reduced ability to ascend/descend stairs  reduced ability to kneel    Participation Restrictions:   Reduced participation in home management   Reduced participation in work activities  Reduced participation in social activities    Classification :   Signs/symptoms consistent with knee OA/hip OA  Signs/symptoms consistent with functional hip weakness/NMR control        Barriers to/and or personal factors that will affect rehab potential:   Age  Environmental, home barriers    Physical Therapy Evaluation Complexity Justification  [x] A history of present problem and 1-2 personal factors and/or co-morbidities that impact the plan of care  [x] A total of 1-2 elements  found upon examination of body systems using standardized tests and measures addressing any of the following: body structures, functions (impairments), activity limitations, and/or participation restrictions  [x] A clinical presentation with stable and/or uncomplicated characteristics   [x] Clinical decision making of LOW (66203 - Typically 20 minutes face-to-face) complexity using standardized patient assessment

## 2024-03-23 ENCOUNTER — HOSPITAL ENCOUNTER (OUTPATIENT)
Dept: PHYSICAL THERAPY | Age: 69
Setting detail: THERAPIES SERIES
Discharge: HOME | End: 2024-03-23
Payer: MEDICARE

## 2024-03-23 PROCEDURE — 97110 THERAPEUTIC EXERCISES: CPT

## 2024-03-23 PROCEDURE — 97140 MANUAL THERAPY 1/> REGIONS: CPT

## 2024-03-23 NOTE — FLOWSHEET NOTE
indicated to include: PROM, Gr I-IV mobilizations, STM, and Dry Needling/IASTM  [x] Modalities as needed that may include: Cryotherapy, Electrical Stimulation, and Thermal Agents  [x] Patient education on joint protection, postural re-education, activity modification, progression of HEP.        [] Aquatic Therapy    Plan: Cont POC- Continue emphasis/focus on exercise progression, modulating pain, promoting relaxation, increasing ROM, and improving soft tissue extensibility. Next visit plan to add new exercises and progress balance     Electronically Signed by LUÍCA DAI, PT  Date: 03/23/2024     Note: Portions of this note have been templated and/or copied from initial evaluation, reassessments and prior notes for documentation efficiency.

## 2024-03-30 ENCOUNTER — HOSPITAL ENCOUNTER (OUTPATIENT)
Dept: PHYSICAL THERAPY | Age: 69
Setting detail: THERAPIES SERIES
Discharge: HOME | End: 2024-03-30
Payer: MEDICARE

## 2024-03-30 PROCEDURE — 97110 THERAPEUTIC EXERCISES: CPT

## 2024-03-30 PROCEDURE — 97140 MANUAL THERAPY 1/> REGIONS: CPT

## 2024-03-30 NOTE — FLOWSHEET NOTE
this time, had repeat cortisone injections in each knee, but did not help as well as the first time.  Currently having same amount of knee pain as she did before the injections.  Pain is worse at night, can wake her, sleeps on back or sides. Pain will increase when she is active too, doesn't matter the activity.  Walking long distance increases pain, after sitting or driving for too long, can have increased pain once getting back up.  Stairs and kneeling increase pain as well.      Subjective:  3/23: HEP helping overall with pain levels down to 2/10.         Test used Initial score  3/7/24 03/30/2024   Pain Summary VAS 5-7/10 5/10   Functional questionnaire WOMAC 73 / 76%    Other:                  OBJECTIVE EXAMINATION       Exercises/Interventions     Therapeutic Ex (05486)  resistance Sets/time Reps Notes/Cues/Progressions   Seated stepper  Bike Level 3 5'  3/23   IB stretch / HR B Gastroc  B Soleus  B HR 20\" X2  20\" X1  2 X10  3/23   HSS at step  Knee flexion at step  1/20\"  20\" 2 L/R  1 B 3/23  3/30          Mat table:  -LAQ  -heel slides  -quad set  -SAQ  -SLR    -hip abd    -hip ext  -knee flexion   2#   2  2  1   10 B  10  10 B   3/30   Quad stretch off Table  15\" X2 L/R 3/23: no strap yet   Sidelying Clamshells  1 12 L/R 3/23   Bridges  2 8 3/23                 Manual Intervention (94438)  TIME     B Patellar mobs All directions     Hamstring stretch      Knee joint mobs      STM/MFR B TFLs, Therabar to B quad/lateral thigh/TFL     B Tibial distraction seated at EOM  6 min     NMR re-education (98978) resistance Sets/time Reps CUES NEEDED   AirEx                                   Therapeutic Activity (18995)  Sets/time     Step ups       Sit stand                              Modalities:    No modalities applied this session    Education/Home Exercise Program: Patient HEP program created electronically.  Refer to [employer] access code:    Access Code: [email or web address]  URL: [email or web address]  Date:

## 2024-04-04 ENCOUNTER — HOSPITAL ENCOUNTER (OUTPATIENT)
Dept: PHYSICAL THERAPY | Age: 69
Setting detail: THERAPIES SERIES
Discharge: HOME | End: 2024-04-04
Payer: MEDICARE

## 2024-04-04 PROCEDURE — 97110 THERAPEUTIC EXERCISES: CPT

## 2024-04-04 PROCEDURE — 97530 THERAPEUTIC ACTIVITIES: CPT

## 2024-04-04 NOTE — PLAN OF CARE
[facility] [address], [place] [address] office: [phone] fax: [phone]     Physical Therapy Discharge Note    Dear Corby Dias MD  ,    We had the pleasure of treating the following patient for physical therapy services at [facility]. A summary of our findings can be found in the updated assessment below.  This includes our plan of care.  If you have any questions or concerns regarding these findings, please do not hesitate to contact me at the office phone number checked above.  Thank you for the referral.     Physician Signature:________________________________Date:__________________  By signing above (or electronic signature), therapist's plan is approved by physician      Functional Outcome:    Test used Initial score  3/7/24 4/4/24   Pain Summary VAS 5-7/10 4/10   Functional questionnaire WOMAC 73 / 76% 47 / 48.9%       Supine hamstring length 90/90:  lacking 25 deg R, lacking 36 deg L  Supine knee AROM extension:  lacking 5 deg R, lacking 5 deg L  -decreased B quad recruitment, L<R    MMT Knee:  -ext    4/5 R, 4+/5 L  -flex   4/5 R, 4+/5 L      Assessment:    Chantell BARROW Eva 1955 is able to report feeling better with PT, demo'ing improvement in R hamstring length and B knee strength, however knee joint ROM is about the same.  Pt reports IND w/HEP and plans to continue to perform to progress deficits due to lack of visits approved through insurance company.  Pt is aware to request PT order in future if pain does not progress as expected.      Overall Response to Treatment:   []Patient is responding well to treatment and improvement is noted with regards to goals   [x]Patient should continue to improve in reasonable time if they continue HEP   []Patient has plateaued and is no longer responding to skilled PT intervention    []Patient is getting worse and would benefit from return to referring 
2021 13:30

## 2024-04-06 ENCOUNTER — HOSPITAL ENCOUNTER (OUTPATIENT)
Dept: PHYSICAL THERAPY | Age: 69
Setting detail: THERAPIES SERIES
End: 2024-04-06
Payer: MEDICARE

## 2024-05-13 ENCOUNTER — OFFICE VISIT (OUTPATIENT)
Dept: INTERNAL MEDICINE CLINIC | Age: 69
End: 2024-05-13
Payer: MEDICARE

## 2024-05-13 VITALS
HEART RATE: 61 BPM | SYSTOLIC BLOOD PRESSURE: 130 MMHG | HEIGHT: 63 IN | WEIGHT: 192 LBS | OXYGEN SATURATION: 94 % | DIASTOLIC BLOOD PRESSURE: 86 MMHG | BODY MASS INDEX: 34.02 KG/M2

## 2024-05-13 DIAGNOSIS — E03.8 SUBCLINICAL HYPOTHYROIDISM: ICD-10-CM

## 2024-05-13 DIAGNOSIS — E53.8 FOLATE DEFICIENCY: ICD-10-CM

## 2024-05-13 DIAGNOSIS — D69.6 THROMBOCYTOPENIA, UNSPECIFIED (HCC): ICD-10-CM

## 2024-05-13 DIAGNOSIS — F43.21 GRIEF: ICD-10-CM

## 2024-05-13 DIAGNOSIS — F32.1 CURRENT MODERATE EPISODE OF MAJOR DEPRESSIVE DISORDER WITHOUT PRIOR EPISODE (HCC): ICD-10-CM

## 2024-05-13 DIAGNOSIS — Z00.00 MEDICARE ANNUAL WELLNESS VISIT, SUBSEQUENT: Primary | ICD-10-CM

## 2024-05-13 DIAGNOSIS — E55.9 VITAMIN D DEFICIENCY: ICD-10-CM

## 2024-05-13 DIAGNOSIS — I10 ESSENTIAL HYPERTENSION: ICD-10-CM

## 2024-05-13 DIAGNOSIS — E78.5 DYSLIPIDEMIA: ICD-10-CM

## 2024-05-13 PROCEDURE — 1123F ACP DISCUSS/DSCN MKR DOCD: CPT | Performed by: NURSE PRACTITIONER

## 2024-05-13 PROCEDURE — G0439 PPPS, SUBSEQ VISIT: HCPCS | Performed by: NURSE PRACTITIONER

## 2024-05-13 PROCEDURE — 99214 OFFICE O/P EST MOD 30 MIN: CPT | Performed by: NURSE PRACTITIONER

## 2024-05-13 PROCEDURE — 3075F SYST BP GE 130 - 139MM HG: CPT | Performed by: NURSE PRACTITIONER

## 2024-05-13 PROCEDURE — 3079F DIAST BP 80-89 MM HG: CPT | Performed by: NURSE PRACTITIONER

## 2024-05-13 ASSESSMENT — PATIENT HEALTH QUESTIONNAIRE - PHQ9
10. IF YOU CHECKED OFF ANY PROBLEMS, HOW DIFFICULT HAVE THESE PROBLEMS MADE IT FOR YOU TO DO YOUR WORK, TAKE CARE OF THINGS AT HOME, OR GET ALONG WITH OTHER PEOPLE: NOT DIFFICULT AT ALL
SUM OF ALL RESPONSES TO PHQ QUESTIONS 1-9: 3
SUM OF ALL RESPONSES TO PHQ QUESTIONS 1-9: 3
4. FEELING TIRED OR HAVING LITTLE ENERGY: MORE THAN HALF THE DAYS
2. FEELING DOWN, DEPRESSED OR HOPELESS: NEARLY EVERY DAY
SUM OF ALL RESPONSES TO PHQ QUESTIONS 1-9: 12
2. FEELING DOWN, DEPRESSED OR HOPELESS: NEARLY EVERY DAY
SUM OF ALL RESPONSES TO PHQ QUESTIONS 1-9: 3
SUM OF ALL RESPONSES TO PHQ QUESTIONS 1-9: 3
SUM OF ALL RESPONSES TO PHQ QUESTIONS 1-9: 12
SUM OF ALL RESPONSES TO PHQ QUESTIONS 1-9: 12
1. LITTLE INTEREST OR PLEASURE IN DOING THINGS: NOT AT ALL
3. TROUBLE FALLING OR STAYING ASLEEP: MORE THAN HALF THE DAYS
SUM OF ALL RESPONSES TO PHQ9 QUESTIONS 1 & 2: 3
9. THOUGHTS THAT YOU WOULD BE BETTER OFF DEAD, OR OF HURTING YOURSELF: NOT AT ALL
1. LITTLE INTEREST OR PLEASURE IN DOING THINGS: NOT AT ALL
5. POOR APPETITE OR OVEREATING: NEARLY EVERY DAY
SUM OF ALL RESPONSES TO PHQ9 QUESTIONS 1 & 2: 3
8. MOVING OR SPEAKING SO SLOWLY THAT OTHER PEOPLE COULD HAVE NOTICED. OR THE OPPOSITE, BEING SO FIGETY OR RESTLESS THAT YOU HAVE BEEN MOVING AROUND A LOT MORE THAN USUAL: NOT AT ALL
SUM OF ALL RESPONSES TO PHQ QUESTIONS 1-9: 12
7. TROUBLE CONCENTRATING ON THINGS, SUCH AS READING THE NEWSPAPER OR WATCHING TELEVISION: SEVERAL DAYS
6. FEELING BAD ABOUT YOURSELF - OR THAT YOU ARE A FAILURE OR HAVE LET YOURSELF OR YOUR FAMILY DOWN: SEVERAL DAYS

## 2024-05-13 ASSESSMENT — LIFESTYLE VARIABLES
HOW MANY STANDARD DRINKS CONTAINING ALCOHOL DO YOU HAVE ON A TYPICAL DAY: PATIENT DOES NOT DRINK
HOW OFTEN DO YOU HAVE A DRINK CONTAINING ALCOHOL: NEVER

## 2024-05-13 NOTE — PROGRESS NOTES
Medicare Annual Wellness Visit    Chantell Velasquez is here for Medicare AWV    Assessment & Plan     Medicare annual wellness visit, subsequent    Grief  - Acute/chronic, new problem  - Discussed benefits of psychology referral and patient is amenable.  - We also discussed medicinal treatments for depression and the patient would like to hold off in favor of counseling first.  -     Ambulatory referral to Psychology    Current moderate episode of major depressive disorder without prior episode (HCC)  - Acute/chronic, new problem  - Discussed benefits of psychology referral and patient is amenable.  - We also discussed medicinal treatments for depression and the patient would like to hold off in favor of counseling first.  -     Ambulatory referral to Psychology    Essential hypertension  - Normotensive  - she has met JNC standards.  - Continue current regimen, lisinopril  -     Comprehensive Metabolic Panel; Future  -     LIPID PANEL; Future    Dyslipidemia  - Chronic, stable  - Continue current regimen, atorvastatin  -     LIPID PANEL; Future    Subclinical hypothyroidism  - Chronic, stable  -Last TSH 4.83 which was improved from previous 5.11  - Continue monitoring  -     TSH with Reflex; Future    Thrombocytopenia, unspecified (HCC)  - New problem, mild  - Previous platelet 133, down from 199  - Recheck  -     CBC with Auto Differential; Future    Folate deficiency  -     Vitamin B12 & Folate; Future    Vitamin D deficiency  -     Vitamin D 25 Hydroxy; Future      Recommendations for Preventive Services Due: see orders and patient instructions/AVS.  Recommended screening schedule for the next 5-10 years is provided to the patient in written form: see Patient Instructions/AVS.    Daughter (33), sister, cousins wife, brother-in-law,        No follow-ups on file.     Subjective   Review of Systems   Constitutional: Negative.    Respiratory: Negative.     Cardiovascular: Negative.    Gastrointestinal: Negative.

## 2024-05-22 DIAGNOSIS — D69.6 THROMBOCYTOPENIA, UNSPECIFIED (HCC): ICD-10-CM

## 2024-05-22 DIAGNOSIS — E03.8 SUBCLINICAL HYPOTHYROIDISM: ICD-10-CM

## 2024-05-22 DIAGNOSIS — I10 ESSENTIAL HYPERTENSION: ICD-10-CM

## 2024-05-22 DIAGNOSIS — E55.9 VITAMIN D DEFICIENCY: ICD-10-CM

## 2024-05-22 DIAGNOSIS — E78.5 DYSLIPIDEMIA: ICD-10-CM

## 2024-05-22 DIAGNOSIS — E53.8 FOLATE DEFICIENCY: ICD-10-CM

## 2024-05-22 LAB
25(OH)D3 SERPL-MCNC: 20.7 NG/ML
ALBUMIN SERPL-MCNC: 4.1 G/DL (ref 3.4–5)
ALBUMIN/GLOB SERPL: 1.4 {RATIO} (ref 1.1–2.2)
ALP SERPL-CCNC: 108 U/L (ref 40–129)
ALT SERPL-CCNC: 9 U/L (ref 10–40)
ANION GAP SERPL CALCULATED.3IONS-SCNC: 11 MMOL/L (ref 3–16)
AST SERPL-CCNC: 15 U/L (ref 15–37)
BASOPHILS # BLD: 0 K/UL (ref 0–0.2)
BASOPHILS NFR BLD: 0.2 %
BILIRUB SERPL-MCNC: 0.6 MG/DL (ref 0–1)
BUN SERPL-MCNC: 10 MG/DL (ref 7–20)
CALCIUM SERPL-MCNC: 9.3 MG/DL (ref 8.3–10.6)
CHLORIDE SERPL-SCNC: 104 MMOL/L (ref 99–110)
CHOLEST SERPL-MCNC: 165 MG/DL (ref 0–199)
CO2 SERPL-SCNC: 27 MMOL/L (ref 21–32)
CREAT SERPL-MCNC: 0.7 MG/DL (ref 0.6–1.2)
DEPRECATED RDW RBC AUTO: 14.5 % (ref 12.4–15.4)
EOSINOPHIL # BLD: 0 K/UL (ref 0–0.6)
EOSINOPHIL NFR BLD: 0 %
FOLATE SERPL-MCNC: 3.55 NG/ML (ref 4.78–24.2)
GFR SERPLBLD CREATININE-BSD FMLA CKD-EPI: >90 ML/MIN/{1.73_M2}
GLUCOSE SERPL-MCNC: 98 MG/DL (ref 70–99)
HCT VFR BLD AUTO: 39.5 % (ref 36–48)
HDLC SERPL-MCNC: 45 MG/DL (ref 40–60)
HGB BLD-MCNC: 13.5 G/DL (ref 12–16)
LDLC SERPL CALC-MCNC: 93 MG/DL
LYMPHOCYTES # BLD: 1.4 K/UL (ref 1–5.1)
LYMPHOCYTES NFR BLD: 27.8 %
MCH RBC QN AUTO: 29.5 PG (ref 26–34)
MCHC RBC AUTO-ENTMCNC: 34.2 G/DL (ref 31–36)
MCV RBC AUTO: 86.2 FL (ref 80–100)
MONOCYTES # BLD: 0.3 K/UL (ref 0–1.3)
MONOCYTES NFR BLD: 6.6 %
NEUTROPHILS # BLD: 3.3 K/UL (ref 1.7–7.7)
NEUTROPHILS NFR BLD: 65.4 %
PLATELET # BLD AUTO: 159 K/UL (ref 135–450)
PMV BLD AUTO: 9.5 FL (ref 5–10.5)
POTASSIUM SERPL-SCNC: 4.1 MMOL/L (ref 3.5–5.1)
PROT SERPL-MCNC: 7 G/DL (ref 6.4–8.2)
RBC # BLD AUTO: 4.58 M/UL (ref 4–5.2)
SODIUM SERPL-SCNC: 142 MMOL/L (ref 136–145)
TRIGL SERPL-MCNC: 134 MG/DL (ref 0–150)
TSH SERPL DL<=0.005 MIU/L-ACNC: 4.16 UIU/ML (ref 0.27–4.2)
VIT B12 SERPL-MCNC: 454 PG/ML (ref 211–911)
VLDLC SERPL CALC-MCNC: 27 MG/DL
WBC # BLD AUTO: 5 K/UL (ref 4–11)

## 2024-05-29 ASSESSMENT — ENCOUNTER SYMPTOMS
GASTROINTESTINAL NEGATIVE: 1
RESPIRATORY NEGATIVE: 1

## 2024-06-13 ENCOUNTER — OFFICE VISIT (OUTPATIENT)
Dept: PSYCHOLOGY | Age: 69
End: 2024-06-13
Payer: MEDICARE

## 2024-06-13 DIAGNOSIS — F32.1 CURRENT MODERATE EPISODE OF MAJOR DEPRESSIVE DISORDER WITHOUT PRIOR EPISODE (HCC): Primary | ICD-10-CM

## 2024-06-13 PROCEDURE — 1123F ACP DISCUSS/DSCN MKR DOCD: CPT | Performed by: PSYCHOLOGIST

## 2024-06-13 PROCEDURE — 90791 PSYCH DIAGNOSTIC EVALUATION: CPT | Performed by: PSYCHOLOGIST

## 2024-06-13 ASSESSMENT — PATIENT HEALTH QUESTIONNAIRE - PHQ9
SUM OF ALL RESPONSES TO PHQ9 QUESTIONS 1 & 2: 4
SUM OF ALL RESPONSES TO PHQ QUESTIONS 1-9: 18
1. LITTLE INTEREST OR PLEASURE IN DOING THINGS: SEVERAL DAYS
SUM OF ALL RESPONSES TO PHQ QUESTIONS 1-9: 18
5. POOR APPETITE OR OVEREATING: NEARLY EVERY DAY
6. FEELING BAD ABOUT YOURSELF - OR THAT YOU ARE A FAILURE OR HAVE LET YOURSELF OR YOUR FAMILY DOWN: NEARLY EVERY DAY
8. MOVING OR SPEAKING SO SLOWLY THAT OTHER PEOPLE COULD HAVE NOTICED. OR THE OPPOSITE, BEING SO FIGETY OR RESTLESS THAT YOU HAVE BEEN MOVING AROUND A LOT MORE THAN USUAL: SEVERAL DAYS
9. THOUGHTS THAT YOU WOULD BE BETTER OFF DEAD, OR OF HURTING YOURSELF: NOT AT ALL
10. IF YOU CHECKED OFF ANY PROBLEMS, HOW DIFFICULT HAVE THESE PROBLEMS MADE IT FOR YOU TO DO YOUR WORK, TAKE CARE OF THINGS AT HOME, OR GET ALONG WITH OTHER PEOPLE: SOMEWHAT DIFFICULT
3. TROUBLE FALLING OR STAYING ASLEEP: MORE THAN HALF THE DAYS
4. FEELING TIRED OR HAVING LITTLE ENERGY: NEARLY EVERY DAY
7. TROUBLE CONCENTRATING ON THINGS, SUCH AS READING THE NEWSPAPER OR WATCHING TELEVISION: MORE THAN HALF THE DAYS
2. FEELING DOWN, DEPRESSED OR HOPELESS: NEARLY EVERY DAY
SUM OF ALL RESPONSES TO PHQ QUESTIONS 1-9: 18
SUM OF ALL RESPONSES TO PHQ QUESTIONS 1-9: 18

## 2024-06-13 ASSESSMENT — ANXIETY QUESTIONNAIRES
3. WORRYING TOO MUCH ABOUT DIFFERENT THINGS: NEARLY EVERY DAY
1. FEELING NERVOUS, ANXIOUS, OR ON EDGE: SEVERAL DAYS
7. FEELING AFRAID AS IF SOMETHING AWFUL MIGHT HAPPEN: NOT AT ALL
GAD7 TOTAL SCORE: 10
4. TROUBLE RELAXING: NOT AT ALL
6. BECOMING EASILY ANNOYED OR IRRITABLE: SEVERAL DAYS
2. NOT BEING ABLE TO STOP OR CONTROL WORRYING: NEARLY EVERY DAY
5. BEING SO RESTLESS THAT IT IS HARD TO SIT STILL: MORE THAN HALF THE DAYS
IF YOU CHECKED OFF ANY PROBLEMS ON THIS QUESTIONNAIRE, HOW DIFFICULT HAVE THESE PROBLEMS MADE IT FOR YOU TO DO YOUR WORK, TAKE CARE OF THINGS AT HOME, OR GET ALONG WITH OTHER PEOPLE: SOMEWHAT DIFFICULT

## 2024-06-13 NOTE — PROGRESS NOTES
coherent  Associations    logical connections  Insight    Fair  Judgment    Intact  Orientation    oriented to person, place, time, and general circumstances  Memory    recent and remote memory intact  Attention/Concentration    impaired  Morbid ideation No  Suicide Assessment    no suicidal ideation    History:    Social History:   Social History     Socioeconomic History    Marital status:      Spouse name: Not on file    Number of children: Not on file    Years of education: Not on file    Highest education level: Not on file   Occupational History    Not on file   Tobacco Use    Smoking status: Never    Smokeless tobacco: Never   Substance and Sexual Activity    Alcohol use: Not on file    Drug use: Not on file    Sexual activity: Not Currently   Other Topics Concern    Not on file   Social History Narrative    Not on file     Social Determinants of Health     Financial Resource Strain: Low Risk  (5/31/2023)    Overall Financial Resource Strain (CARDIA)     Difficulty of Paying Living Expenses: Not hard at all   Food Insecurity: Not on file (5/31/2023)   Transportation Needs: Unknown (5/31/2023)    PRAPARE - Transportation     Lack of Transportation (Medical): Not on file     Lack of Transportation (Non-Medical): No   Physical Activity: Inactive (5/13/2024)    Exercise Vital Sign     Days of Exercise per Week: 0 days     Minutes of Exercise per Session: 0 min   Stress: Not on file   Social Connections: Not on file   Intimate Partner Violence: Not on file   Housing Stability: Unknown (5/31/2023)    Housing Stability Vital Sign     Unable to Pay for Housing in the Last Year: Not on file     Number of Places Lived in the Last Year: Not on file     Unstable Housing in the Last Year: No     TOBACCO:   reports that she has never smoked. She has never used smokeless tobacco.  ETOH:   has no history on file for alcohol use.    A:  Administered PHQ-9 (see below). Patient endorses moderately severe symptoms of

## 2024-07-03 DIAGNOSIS — E78.5 DYSLIPIDEMIA: ICD-10-CM

## 2024-07-03 DIAGNOSIS — E55.9 VITAMIN D DEFICIENCY: ICD-10-CM

## 2024-07-03 DIAGNOSIS — E53.8 FOLATE DEFICIENCY: ICD-10-CM

## 2024-07-03 RX ORDER — LANOLIN ALCOHOL/MO/W.PET/CERES
400 CREAM (GRAM) TOPICAL DAILY
Qty: 90 TABLET | Refills: 1 | Status: SHIPPED | OUTPATIENT
Start: 2024-07-03

## 2024-07-03 RX ORDER — ATORVASTATIN CALCIUM 20 MG/1
20 TABLET, FILM COATED ORAL NIGHTLY
Qty: 90 TABLET | Refills: 1 | Status: SHIPPED | OUTPATIENT
Start: 2024-07-03

## 2024-07-03 RX ORDER — MULTIVIT-MIN/IRON/FOLIC ACID/K 18-600-40
1 CAPSULE ORAL DAILY
Qty: 90 TABLET | Refills: 1 | Status: SHIPPED | OUTPATIENT
Start: 2024-07-03

## 2024-07-31 ENCOUNTER — OFFICE VISIT (OUTPATIENT)
Dept: PSYCHOLOGY | Age: 69
End: 2024-07-31

## 2024-07-31 DIAGNOSIS — F32.1 CURRENT MODERATE EPISODE OF MAJOR DEPRESSIVE DISORDER WITHOUT PRIOR EPISODE (HCC): Primary | ICD-10-CM

## 2024-07-31 ASSESSMENT — ANXIETY QUESTIONNAIRES
GAD7 TOTAL SCORE: 9
2. NOT BEING ABLE TO STOP OR CONTROL WORRYING: NEARLY EVERY DAY
3. WORRYING TOO MUCH ABOUT DIFFERENT THINGS: NEARLY EVERY DAY
IF YOU CHECKED OFF ANY PROBLEMS ON THIS QUESTIONNAIRE, HOW DIFFICULT HAVE THESE PROBLEMS MADE IT FOR YOU TO DO YOUR WORK, TAKE CARE OF THINGS AT HOME, OR GET ALONG WITH OTHER PEOPLE: SOMEWHAT DIFFICULT
1. FEELING NERVOUS, ANXIOUS, OR ON EDGE: SEVERAL DAYS
7. FEELING AFRAID AS IF SOMETHING AWFUL MIGHT HAPPEN: NOT AT ALL
6. BECOMING EASILY ANNOYED OR IRRITABLE: SEVERAL DAYS
5. BEING SO RESTLESS THAT IT IS HARD TO SIT STILL: SEVERAL DAYS
4. TROUBLE RELAXING: NOT AT ALL

## 2024-07-31 ASSESSMENT — PATIENT HEALTH QUESTIONNAIRE - PHQ9
2. FEELING DOWN, DEPRESSED OR HOPELESS: MORE THAN HALF THE DAYS
3. TROUBLE FALLING OR STAYING ASLEEP: SEVERAL DAYS
SUM OF ALL RESPONSES TO PHQ QUESTIONS 1-9: 13
5. POOR APPETITE OR OVEREATING: NEARLY EVERY DAY
SUM OF ALL RESPONSES TO PHQ QUESTIONS 1-9: 13
7. TROUBLE CONCENTRATING ON THINGS, SUCH AS READING THE NEWSPAPER OR WATCHING TELEVISION: SEVERAL DAYS
6. FEELING BAD ABOUT YOURSELF - OR THAT YOU ARE A FAILURE OR HAVE LET YOURSELF OR YOUR FAMILY DOWN: MORE THAN HALF THE DAYS
SUM OF ALL RESPONSES TO PHQ QUESTIONS 1-9: 13
1. LITTLE INTEREST OR PLEASURE IN DOING THINGS: SEVERAL DAYS
SUM OF ALL RESPONSES TO PHQ9 QUESTIONS 1 & 2: 3
4. FEELING TIRED OR HAVING LITTLE ENERGY: NEARLY EVERY DAY
10. IF YOU CHECKED OFF ANY PROBLEMS, HOW DIFFICULT HAVE THESE PROBLEMS MADE IT FOR YOU TO DO YOUR WORK, TAKE CARE OF THINGS AT HOME, OR GET ALONG WITH OTHER PEOPLE: SOMEWHAT DIFFICULT
SUM OF ALL RESPONSES TO PHQ QUESTIONS 1-9: 13
8. MOVING OR SPEAKING SO SLOWLY THAT OTHER PEOPLE COULD HAVE NOTICED. OR THE OPPOSITE, BEING SO FIGETY OR RESTLESS THAT YOU HAVE BEEN MOVING AROUND A LOT MORE THAN USUAL: NOT AT ALL
9. THOUGHTS THAT YOU WOULD BE BETTER OFF DEAD, OR OF HURTING YOURSELF: NOT AT ALL

## 2024-07-31 NOTE — PROGRESS NOTES
Behavioral Health Consultation  Valentine Salazar, Ph.D.  Psychologist  2024  3:10 PM      Time spent with Patient: 25 minutes  This is patient's second  [facility] appointment.    Reason for Consult:    Chief Complaint   Patient presents with    Depression       Feedback given to PCP.    S:  Pt seen for f/u of depression. Pt reported slight improved mood and sxs. Will have 3-4 \"good\" days wo ruminating on negative things, watches her 2 and 2yo grand kids and notices she can be too short w them. Pt lives w them and struggles w differing parenting styles; daughter works from home and will jump in if she hears pt raise her voice.     When she has a bad day, she mostly thinks about her daughter who , often wondering what she could have done differently. This is happening a little less.     O:  MSE:    Appearance    alert, cooperative  Appetite abnormal: variable  Sleep disturbance Yes  Fatigue Yes  Loss of pleasure Yes  Impulsive behavior No  Speech    spontaneous, normal rate, normal volume, and well articulated  Mood    Anxious  Affect    normal affect  Thought Content    intact  Thought Process    linear, goal directed, and coherent  Associations    logical connections  Insight    Fair  Judgment    Intact  Orientation    oriented to person, place, time, and general circumstances  Memory    recent and remote memory intact  Attention/Concentration    impaired  Morbid ideation No  Suicide Assessment    no suicidal ideation    History:  Social History:   Social History     Socioeconomic History    Marital status:      Spouse name: Not on file    Number of children: Not on file    Years of education: Not on file    Highest education level: Not on file   Occupational History    Not on file   Tobacco Use    Smoking status: Never    Smokeless tobacco: Never   Substance and Sexual Activity    Alcohol use: Not on file    Drug use: Not on file    Sexual activity: Not Currently   Other Topics Concern    Not on file

## 2024-07-31 NOTE — PATIENT INSTRUCTIONS
\"The entire autonomic nervous system (and through it, our internal organs and glands) is largely driven by our breathing patterns. By changing our breathing we can influence millions of biochemical reactions in our body, producing more relaxing substances such as endorphins and fewer anxiety-producing ones like adrenaline and higher blood acidity. Mindfulness of the breath is so effective that it is common to all meditative and prayer traditions.\" Anxiety Fear & Breathing - Breathing.com    \"When overcoming high levels of anxiety, it is important to learn the techniques of correct breathing. Many people who live with high levels of anxiety are known to breathe through their chest. Shallow breathing through the chest means you are disrupting the balance of oxygen and carbon dioxide necessary to be in a relaxed state. This type of breathing will perpetuate the symptoms of anxiety.\" [email or web address]      Diaphragmatic Breathing  ( You can download the free dion,  [email or web address], to practice)           _____________________________________________________________________________  1.  Sit in a comfortable position    2.  Place one hand on your stomach and the other on your chest    3.  Try to breathe so that only your stomach rises and falls    As you inhale, concentrate on your chest remaining relatively still while your stomach rises.  It may be helpful for you to imagine that your pants are too big and you need to push your stomach out to hold them up.  When exhaling, allow your stomach to fall in and the air to fully escape.    Inhale slowly.  You may choose to hold the air in for about a second.  Exhale slowly.  Don’t push the air out, but just let the natural pressure of your body slowly move it out.    It is normal for this healthy method of breathing to feel a little awkward at first.  With practice, it will feel more natural.    Get your mind on your side    One other important factor in getting relaxed is your

## 2024-08-13 ENCOUNTER — OFFICE VISIT (OUTPATIENT)
Dept: INTERNAL MEDICINE CLINIC | Age: 69
End: 2024-08-13
Payer: MEDICARE

## 2024-08-13 VITALS
OXYGEN SATURATION: 99 % | BODY MASS INDEX: 34.73 KG/M2 | SYSTOLIC BLOOD PRESSURE: 130 MMHG | HEIGHT: 63 IN | DIASTOLIC BLOOD PRESSURE: 80 MMHG | HEART RATE: 61 BPM | WEIGHT: 196 LBS

## 2024-08-13 DIAGNOSIS — D69.6 THROMBOCYTOPENIA, UNSPECIFIED (HCC): ICD-10-CM

## 2024-08-13 DIAGNOSIS — F43.21 GRIEF: ICD-10-CM

## 2024-08-13 DIAGNOSIS — I10 ESSENTIAL HYPERTENSION: ICD-10-CM

## 2024-08-13 DIAGNOSIS — F32.1 CURRENT MODERATE EPISODE OF MAJOR DEPRESSIVE DISORDER WITHOUT PRIOR EPISODE (HCC): Primary | ICD-10-CM

## 2024-08-13 DIAGNOSIS — M17.0 PRIMARY OSTEOARTHRITIS OF BOTH KNEES: ICD-10-CM

## 2024-08-13 PROCEDURE — 3075F SYST BP GE 130 - 139MM HG: CPT | Performed by: NURSE PRACTITIONER

## 2024-08-13 PROCEDURE — 3079F DIAST BP 80-89 MM HG: CPT | Performed by: NURSE PRACTITIONER

## 2024-08-13 PROCEDURE — G2211 COMPLEX E/M VISIT ADD ON: HCPCS | Performed by: NURSE PRACTITIONER

## 2024-08-13 PROCEDURE — 1123F ACP DISCUSS/DSCN MKR DOCD: CPT | Performed by: NURSE PRACTITIONER

## 2024-08-13 PROCEDURE — 99214 OFFICE O/P EST MOD 30 MIN: CPT | Performed by: NURSE PRACTITIONER

## 2024-08-13 RX ORDER — SERTRALINE HYDROCHLORIDE 25 MG/1
25 TABLET, FILM COATED ORAL DAILY
Qty: 90 TABLET | Refills: 1 | Status: SHIPPED | OUTPATIENT
Start: 2024-08-13

## 2024-08-13 SDOH — ECONOMIC STABILITY: INCOME INSECURITY: HOW HARD IS IT FOR YOU TO PAY FOR THE VERY BASICS LIKE FOOD, HOUSING, MEDICAL CARE, AND HEATING?: NOT HARD AT ALL

## 2024-08-13 SDOH — ECONOMIC STABILITY: FOOD INSECURITY: WITHIN THE PAST 12 MONTHS, THE FOOD YOU BOUGHT JUST DIDN'T LAST AND YOU DIDN'T HAVE MONEY TO GET MORE.: NEVER TRUE

## 2024-08-13 SDOH — ECONOMIC STABILITY: FOOD INSECURITY: WITHIN THE PAST 12 MONTHS, YOU WORRIED THAT YOUR FOOD WOULD RUN OUT BEFORE YOU GOT MONEY TO BUY MORE.: NEVER TRUE

## 2024-08-20 ASSESSMENT — ENCOUNTER SYMPTOMS
RESPIRATORY NEGATIVE: 1
GASTROINTESTINAL NEGATIVE: 1

## 2024-08-20 NOTE — PROGRESS NOTES
Psychiatric/Behavioral:  Positive for dysphoric mood.          OBJECTIVE:  Physical Exam  Constitutional:       Appearance: Normal appearance.   HENT:      Head: Normocephalic and atraumatic.   Cardiovascular:      Rate and Rhythm: Normal rate and regular rhythm.   Pulmonary:      Effort: Pulmonary effort is normal. No respiratory distress.      Breath sounds: No wheezing, rhonchi or rales.   Skin:     General: Skin is warm and dry.   Neurological:      General: No focal deficit present.      Mental Status: She is alert and oriented to person, place, and time.   Psychiatric:         Mood and Affect: Mood is depressed.         Behavior: Behavior normal.        /80   Pulse 61   Ht 1.6 m (5' 3\")   Wt 88.9 kg (196 lb)   SpO2 99%   BMI 34.72 kg/m²          7/31/2024     3:09 PM 6/13/2024     1:38 PM 5/13/2024     4:27 PM 5/13/2024     4:26 PM 5/31/2023     7:35 AM 1/4/2023    10:20 AM 5/19/2022     8:45 AM   PHQ Scores   PHQ2 Score 3 4 3 3 1 0 0   PHQ9 Score 13 18 12 3 1 0 0     Interpretation of Total Score Depression Severity: 1-4 = Minimal depression, 5-9 = Mild depression, 10-14 = Moderate depression, 15-19 = Moderately severe depression, 20-27 =Severe depression        Lab Results   Component Value Date     05/22/2024    K 4.1 05/22/2024     05/22/2024    CO2 27 05/22/2024    BUN 10 05/22/2024    CREATININE 0.7 05/22/2024    GLUCOSE 98 05/22/2024    CALCIUM 9.3 05/22/2024    BILITOT 0.6 05/22/2024    ALKPHOS 108 05/22/2024    AST 15 05/22/2024    ALT 9 (L) 05/22/2024    LABGLOM >90 05/22/2024    GFRAA >60 12/22/2021    AGRATIO 1.4 05/22/2024    GLOB 3.4 07/28/2020       Lab Results   Component Value Date    WBC 5.0 05/22/2024    HGB 13.5 05/22/2024    HCT 39.5 05/22/2024    MCV 86.2 05/22/2024     05/22/2024       Lab Results   Component Value Date    CHOL 165 05/22/2024    TRIG 134 05/22/2024    HDL 45 05/22/2024    LDL 93 05/22/2024    VLDL 27 05/22/2024 
- - -

## 2024-09-09 ENCOUNTER — OFFICE VISIT (OUTPATIENT)
Dept: PSYCHOLOGY | Age: 69
End: 2024-09-09
Payer: MEDICARE

## 2024-09-09 DIAGNOSIS — F32.1 CURRENT MODERATE EPISODE OF MAJOR DEPRESSIVE DISORDER WITHOUT PRIOR EPISODE (HCC): Primary | ICD-10-CM

## 2024-09-09 PROCEDURE — 90832 PSYTX W PT 30 MINUTES: CPT | Performed by: PSYCHOLOGIST

## 2024-09-09 PROCEDURE — 1123F ACP DISCUSS/DSCN MKR DOCD: CPT | Performed by: PSYCHOLOGIST

## 2024-09-09 ASSESSMENT — PATIENT HEALTH QUESTIONNAIRE - PHQ9
SUM OF ALL RESPONSES TO PHQ QUESTIONS 1-9: 6
SUM OF ALL RESPONSES TO PHQ QUESTIONS 1-9: 6
8. MOVING OR SPEAKING SO SLOWLY THAT OTHER PEOPLE COULD HAVE NOTICED. OR THE OPPOSITE, BEING SO FIGETY OR RESTLESS THAT YOU HAVE BEEN MOVING AROUND A LOT MORE THAN USUAL: NOT AT ALL
SUM OF ALL RESPONSES TO PHQ QUESTIONS 1-9: 6
SUM OF ALL RESPONSES TO PHQ9 QUESTIONS 1 & 2: 2
6. FEELING BAD ABOUT YOURSELF - OR THAT YOU ARE A FAILURE OR HAVE LET YOURSELF OR YOUR FAMILY DOWN: NOT AT ALL
4. FEELING TIRED OR HAVING LITTLE ENERGY: SEVERAL DAYS
10. IF YOU CHECKED OFF ANY PROBLEMS, HOW DIFFICULT HAVE THESE PROBLEMS MADE IT FOR YOU TO DO YOUR WORK, TAKE CARE OF THINGS AT HOME, OR GET ALONG WITH OTHER PEOPLE: NOT DIFFICULT AT ALL
2. FEELING DOWN, DEPRESSED OR HOPELESS: SEVERAL DAYS
5. POOR APPETITE OR OVEREATING: NEARLY EVERY DAY
9. THOUGHTS THAT YOU WOULD BE BETTER OFF DEAD, OR OF HURTING YOURSELF: NOT AT ALL
3. TROUBLE FALLING OR STAYING ASLEEP: NOT AT ALL
7. TROUBLE CONCENTRATING ON THINGS, SUCH AS READING THE NEWSPAPER OR WATCHING TELEVISION: NOT AT ALL
1. LITTLE INTEREST OR PLEASURE IN DOING THINGS: SEVERAL DAYS
SUM OF ALL RESPONSES TO PHQ QUESTIONS 1-9: 6

## 2024-09-09 ASSESSMENT — ANXIETY QUESTIONNAIRES
GAD7 TOTAL SCORE: 8
3. WORRYING TOO MUCH ABOUT DIFFERENT THINGS: MORE THAN HALF THE DAYS
1. FEELING NERVOUS, ANXIOUS, OR ON EDGE: SEVERAL DAYS
6. BECOMING EASILY ANNOYED OR IRRITABLE: SEVERAL DAYS
5. BEING SO RESTLESS THAT IT IS HARD TO SIT STILL: SEVERAL DAYS
2. NOT BEING ABLE TO STOP OR CONTROL WORRYING: NEARLY EVERY DAY
4. TROUBLE RELAXING: NOT AT ALL
IF YOU CHECKED OFF ANY PROBLEMS ON THIS QUESTIONNAIRE, HOW DIFFICULT HAVE THESE PROBLEMS MADE IT FOR YOU TO DO YOUR WORK, TAKE CARE OF THINGS AT HOME, OR GET ALONG WITH OTHER PEOPLE: SOMEWHAT DIFFICULT
7. FEELING AFRAID AS IF SOMETHING AWFUL MIGHT HAPPEN: NOT AT ALL

## 2024-10-03 ENCOUNTER — OFFICE VISIT (OUTPATIENT)
Dept: INTERNAL MEDICINE CLINIC | Age: 69
End: 2024-10-03
Payer: MEDICARE

## 2024-10-03 VITALS
BODY MASS INDEX: 34.55 KG/M2 | WEIGHT: 195 LBS | DIASTOLIC BLOOD PRESSURE: 80 MMHG | OXYGEN SATURATION: 97 % | SYSTOLIC BLOOD PRESSURE: 128 MMHG | HEIGHT: 63 IN | HEART RATE: 52 BPM

## 2024-10-03 DIAGNOSIS — I10 ESSENTIAL HYPERTENSION: ICD-10-CM

## 2024-10-03 DIAGNOSIS — F32.1 CURRENT MODERATE EPISODE OF MAJOR DEPRESSIVE DISORDER WITHOUT PRIOR EPISODE (HCC): Primary | ICD-10-CM

## 2024-10-03 DIAGNOSIS — Z12.31 ENCOUNTER FOR SCREENING MAMMOGRAM FOR MALIGNANT NEOPLASM OF BREAST: ICD-10-CM

## 2024-10-03 DIAGNOSIS — M54.50 LUMBAR PAIN: ICD-10-CM

## 2024-10-03 DIAGNOSIS — F43.21 GRIEF: ICD-10-CM

## 2024-10-03 PROCEDURE — 99214 OFFICE O/P EST MOD 30 MIN: CPT | Performed by: NURSE PRACTITIONER

## 2024-10-03 PROCEDURE — 1123F ACP DISCUSS/DSCN MKR DOCD: CPT | Performed by: NURSE PRACTITIONER

## 2024-10-03 PROCEDURE — 3074F SYST BP LT 130 MM HG: CPT | Performed by: NURSE PRACTITIONER

## 2024-10-03 PROCEDURE — 3079F DIAST BP 80-89 MM HG: CPT | Performed by: NURSE PRACTITIONER

## 2024-10-06 DIAGNOSIS — I10 ESSENTIAL HYPERTENSION: ICD-10-CM

## 2024-10-07 RX ORDER — LISINOPRIL 20 MG/1
20 TABLET ORAL DAILY
Qty: 90 TABLET | Refills: 3 | Status: SHIPPED | OUTPATIENT
Start: 2024-10-07

## 2024-10-11 PROBLEM — F32.1 CURRENT MODERATE EPISODE OF MAJOR DEPRESSIVE DISORDER WITHOUT PRIOR EPISODE (HCC): Status: ACTIVE | Noted: 2024-10-11

## 2024-10-11 ASSESSMENT — ENCOUNTER SYMPTOMS
BACK PAIN: 1
GASTROINTESTINAL NEGATIVE: 1
RESPIRATORY NEGATIVE: 1

## 2024-10-11 NOTE — PROGRESS NOTES
SUBJECTIVE:    Patient ID: Chantell Velasquez is a 69 y.o. female.    CC: Depression, grief, hypertension, back pain    HPI: The patient presents the office today for short follow-up of chronic medical conditions as well as new health concerns.    Patient was seen in the office about 6 weeks ago with depression manifesting with poor mood, anhedonia, insomnia.  The symptoms have been present for almost a year and have been aggravated by multiple losses in her close family.  She had been referred to psychology and was seeing psychologist here which she felt to be helpful.  At her last appointment, she was amenable to starting low-dose sertraline.  She was started on sertraline 25 mg daily which she has been taking as directed.  She feels the medication is helpful and does not want to increase the dose.    She has been having some lower back pain.  This has been present for the past few months.  It is aggravated with bending and lifting.  She denies any changes in bowel or bladder.  She denies any radiation down her legs.    She has a history of hypertension.  She denies any chest pain or shortness of breath.  She is compliant with her medication regimen.      Past Medical History:   Diagnosis Date    Crohn's disease (HCC)     Palpable purpura (HCC)     Pancytopenia (HCC) 3/3/2016    Pernicious anemia         Current Outpatient Medications   Medication Sig Dispense Refill    sertraline (ZOLOFT) 25 MG tablet Take 1 tablet by mouth daily 90 tablet 1    vitamin D (D3-1000) 25 MCG (1000 UT) TABS tablet TAKE 1 TABLET BY MOUTH DAILY 90 tablet 1    folic acid (FOLVITE) 400 MCG tablet TAKE 1 TABLET BY MOUTH DAILY 90 tablet 1    atorvastatin (LIPITOR) 20 MG tablet TAKE 1 TABLET BY MOUTH AT BEDTIME 90 tablet 1    tiZANidine (ZANAFLEX) 2 MG tablet Take 1 tablet by mouth 3 times daily as needed (scitica) 30 tablet 0    cyanocobalamin 1000 MCG/ML injection Inject 1 mL into the muscle once      lisinopril (PRINIVIL;ZESTRIL) 20 MG tablet

## 2024-10-17 ENCOUNTER — HOSPITAL ENCOUNTER (OUTPATIENT)
Dept: WOMENS IMAGING | Age: 69
Discharge: HOME | End: 2024-10-17
Payer: MEDICARE

## 2024-10-17 VITALS — BODY MASS INDEX: 34.55 KG/M2 | HEIGHT: 63 IN | WEIGHT: 195 LBS

## 2024-10-17 DIAGNOSIS — Z12.31 ENCOUNTER FOR SCREENING MAMMOGRAM FOR MALIGNANT NEOPLASM OF BREAST: ICD-10-CM

## 2024-10-17 PROCEDURE — 77063 BREAST TOMOSYNTHESIS BI: CPT

## 2025-01-06 DIAGNOSIS — E78.5 DYSLIPIDEMIA: ICD-10-CM

## 2025-01-06 DIAGNOSIS — E53.8 FOLATE DEFICIENCY: ICD-10-CM

## 2025-01-06 RX ORDER — FOLIC ACID 0.4 MG
400 TABLET ORAL DAILY
Qty: 90 TABLET | Refills: 1 | Status: SHIPPED | OUTPATIENT
Start: 2025-01-06

## 2025-01-06 RX ORDER — ATORVASTATIN CALCIUM 20 MG/1
20 TABLET, FILM COATED ORAL NIGHTLY
Qty: 90 TABLET | Refills: 1 | Status: SHIPPED | OUTPATIENT
Start: 2025-01-06

## 2025-02-05 DIAGNOSIS — F32.1 CURRENT MODERATE EPISODE OF MAJOR DEPRESSIVE DISORDER WITHOUT PRIOR EPISODE (HCC): ICD-10-CM

## 2025-02-05 DIAGNOSIS — F43.21 GRIEF: ICD-10-CM

## 2025-02-05 RX ORDER — SERTRALINE HYDROCHLORIDE 25 MG/1
25 TABLET, FILM COATED ORAL DAILY
Qty: 90 TABLET | Refills: 1 | Status: SHIPPED | OUTPATIENT
Start: 2025-02-05

## 2025-04-10 ENCOUNTER — OFFICE VISIT (OUTPATIENT)
Dept: INTERNAL MEDICINE CLINIC | Age: 70
End: 2025-04-10
Payer: MEDICARE

## 2025-04-10 VITALS
HEART RATE: 61 BPM | BODY MASS INDEX: 34.04 KG/M2 | OXYGEN SATURATION: 97 % | HEIGHT: 63 IN | DIASTOLIC BLOOD PRESSURE: 76 MMHG | SYSTOLIC BLOOD PRESSURE: 122 MMHG | WEIGHT: 192.1 LBS

## 2025-04-10 DIAGNOSIS — F32.1 CURRENT MODERATE EPISODE OF MAJOR DEPRESSIVE DISORDER WITHOUT PRIOR EPISODE (HCC): ICD-10-CM

## 2025-04-10 DIAGNOSIS — M25.561 CHRONIC PAIN OF BOTH KNEES: Primary | ICD-10-CM

## 2025-04-10 DIAGNOSIS — D53.9 MACROCYTIC ANEMIA: ICD-10-CM

## 2025-04-10 DIAGNOSIS — M25.562 CHRONIC PAIN OF BOTH KNEES: Primary | ICD-10-CM

## 2025-04-10 DIAGNOSIS — K50.90 CROHN'S DISEASE WITHOUT COMPLICATION, UNSPECIFIED GASTROINTESTINAL TRACT LOCATION (HCC): ICD-10-CM

## 2025-04-10 DIAGNOSIS — E03.8 SUBCLINICAL HYPOTHYROIDISM: ICD-10-CM

## 2025-04-10 DIAGNOSIS — G89.29 CHRONIC PAIN OF BOTH KNEES: Primary | ICD-10-CM

## 2025-04-10 DIAGNOSIS — I10 ESSENTIAL HYPERTENSION: ICD-10-CM

## 2025-04-10 DIAGNOSIS — E78.5 DYSLIPIDEMIA: ICD-10-CM

## 2025-04-10 DIAGNOSIS — M54.50 CHRONIC MIDLINE LOW BACK PAIN WITHOUT SCIATICA: ICD-10-CM

## 2025-04-10 DIAGNOSIS — M79.89 BILATERAL HAND SWELLING: ICD-10-CM

## 2025-04-10 DIAGNOSIS — G89.29 CHRONIC MIDLINE LOW BACK PAIN WITHOUT SCIATICA: ICD-10-CM

## 2025-04-10 DIAGNOSIS — E55.9 VITAMIN D DEFICIENCY: ICD-10-CM

## 2025-04-10 DIAGNOSIS — D51.1 VITAMIN B12 DEFICIENCY ANEMIA DUE TO SELECTIVE VITAMIN B12 MALABSORPTION WITH PROTEINURIA: ICD-10-CM

## 2025-04-10 PROCEDURE — 3074F SYST BP LT 130 MM HG: CPT | Performed by: NURSE PRACTITIONER

## 2025-04-10 PROCEDURE — 99214 OFFICE O/P EST MOD 30 MIN: CPT | Performed by: NURSE PRACTITIONER

## 2025-04-10 PROCEDURE — 1123F ACP DISCUSS/DSCN MKR DOCD: CPT | Performed by: NURSE PRACTITIONER

## 2025-04-10 PROCEDURE — 3078F DIAST BP <80 MM HG: CPT | Performed by: NURSE PRACTITIONER

## 2025-04-10 RX ORDER — MULTIVIT-MIN/IRON/FOLIC ACID/K 18-600-40
1 CAPSULE ORAL DAILY
Qty: 90 TABLET | Refills: 3 | Status: SHIPPED | OUTPATIENT
Start: 2025-04-10

## 2025-04-10 RX ORDER — PREDNISONE 20 MG/1
20 TABLET ORAL DAILY
Qty: 10 TABLET | Refills: 0 | Status: SHIPPED | OUTPATIENT
Start: 2025-04-10 | End: 2025-04-20

## 2025-04-10 SDOH — ECONOMIC STABILITY: FOOD INSECURITY: WITHIN THE PAST 12 MONTHS, THE FOOD YOU BOUGHT JUST DIDN'T LAST AND YOU DIDN'T HAVE MONEY TO GET MORE.: NEVER TRUE

## 2025-04-10 SDOH — ECONOMIC STABILITY: FOOD INSECURITY: WITHIN THE PAST 12 MONTHS, YOU WORRIED THAT YOUR FOOD WOULD RUN OUT BEFORE YOU GOT MONEY TO BUY MORE.: NEVER TRUE

## 2025-04-10 ASSESSMENT — PATIENT HEALTH QUESTIONNAIRE - PHQ9
SUM OF ALL RESPONSES TO PHQ QUESTIONS 1-9: 1
4. FEELING TIRED OR HAVING LITTLE ENERGY: NOT AT ALL
2. FEELING DOWN, DEPRESSED OR HOPELESS: NOT AT ALL
6. FEELING BAD ABOUT YOURSELF - OR THAT YOU ARE A FAILURE OR HAVE LET YOURSELF OR YOUR FAMILY DOWN: NOT AT ALL
SUM OF ALL RESPONSES TO PHQ QUESTIONS 1-9: 1
5. POOR APPETITE OR OVEREATING: NOT AT ALL
SUM OF ALL RESPONSES TO PHQ QUESTIONS 1-9: 1
8. MOVING OR SPEAKING SO SLOWLY THAT OTHER PEOPLE COULD HAVE NOTICED. OR THE OPPOSITE, BEING SO FIGETY OR RESTLESS THAT YOU HAVE BEEN MOVING AROUND A LOT MORE THAN USUAL: SEVERAL DAYS
3. TROUBLE FALLING OR STAYING ASLEEP: NOT AT ALL
SUM OF ALL RESPONSES TO PHQ QUESTIONS 1-9: 1
10. IF YOU CHECKED OFF ANY PROBLEMS, HOW DIFFICULT HAVE THESE PROBLEMS MADE IT FOR YOU TO DO YOUR WORK, TAKE CARE OF THINGS AT HOME, OR GET ALONG WITH OTHER PEOPLE: NOT DIFFICULT AT ALL
9. THOUGHTS THAT YOU WOULD BE BETTER OFF DEAD, OR OF HURTING YOURSELF: NOT AT ALL
1. LITTLE INTEREST OR PLEASURE IN DOING THINGS: NOT AT ALL
7. TROUBLE CONCENTRATING ON THINGS, SUCH AS READING THE NEWSPAPER OR WATCHING TELEVISION: NOT AT ALL

## 2025-04-11 DIAGNOSIS — G89.29 CHRONIC MIDLINE LOW BACK PAIN WITHOUT SCIATICA: ICD-10-CM

## 2025-04-11 DIAGNOSIS — E55.9 VITAMIN D DEFICIENCY: ICD-10-CM

## 2025-04-11 DIAGNOSIS — M79.89 BILATERAL HAND SWELLING: ICD-10-CM

## 2025-04-11 DIAGNOSIS — G89.29 CHRONIC PAIN OF BOTH KNEES: ICD-10-CM

## 2025-04-11 DIAGNOSIS — M25.561 CHRONIC PAIN OF BOTH KNEES: ICD-10-CM

## 2025-04-11 DIAGNOSIS — M25.562 CHRONIC PAIN OF BOTH KNEES: ICD-10-CM

## 2025-04-11 DIAGNOSIS — E78.5 DYSLIPIDEMIA: ICD-10-CM

## 2025-04-11 DIAGNOSIS — I10 ESSENTIAL HYPERTENSION: ICD-10-CM

## 2025-04-11 DIAGNOSIS — M54.50 CHRONIC MIDLINE LOW BACK PAIN WITHOUT SCIATICA: ICD-10-CM

## 2025-04-11 DIAGNOSIS — D53.9 MACROCYTIC ANEMIA: ICD-10-CM

## 2025-04-11 DIAGNOSIS — D51.1 VITAMIN B12 DEFICIENCY ANEMIA DUE TO SELECTIVE VITAMIN B12 MALABSORPTION WITH PROTEINURIA: ICD-10-CM

## 2025-04-11 DIAGNOSIS — E03.8 SUBCLINICAL HYPOTHYROIDISM: ICD-10-CM

## 2025-04-11 LAB
25(OH)D3 SERPL-MCNC: 13.2 NG/ML
ALBUMIN SERPL-MCNC: 4.2 G/DL (ref 3.4–5)
ALBUMIN/GLOB SERPL: 1.6 {RATIO} (ref 1.1–2.2)
ALP SERPL-CCNC: 105 U/L (ref 40–129)
ALT SERPL-CCNC: 12 U/L (ref 10–40)
ANION GAP SERPL CALCULATED.3IONS-SCNC: 9 MMOL/L (ref 3–16)
AST SERPL-CCNC: 19 U/L (ref 15–37)
BASOPHILS # BLD: 0 K/UL (ref 0–0.2)
BASOPHILS NFR BLD: 0.5 %
BILIRUB SERPL-MCNC: 0.7 MG/DL (ref 0–1)
BUN SERPL-MCNC: 18 MG/DL (ref 7–20)
CALCIUM SERPL-MCNC: 9.2 MG/DL (ref 8.3–10.6)
CHLORIDE SERPL-SCNC: 108 MMOL/L (ref 99–110)
CHOLEST SERPL-MCNC: 168 MG/DL (ref 0–199)
CO2 SERPL-SCNC: 27 MMOL/L (ref 21–32)
CREAT SERPL-MCNC: 0.7 MG/DL (ref 0.6–1.2)
CRP SERPL-MCNC: <3 MG/L (ref 0–5.1)
DEPRECATED RDW RBC AUTO: 14.1 % (ref 12.4–15.4)
EOSINOPHIL # BLD: 0 K/UL (ref 0–0.6)
EOSINOPHIL NFR BLD: 0 %
ERYTHROCYTE [SEDIMENTATION RATE] IN BLOOD BY WESTERGREN METHOD: 11 MM/HR (ref 0–30)
FOLATE SERPL-MCNC: 5.61 NG/ML (ref 4.78–24.2)
GFR SERPLBLD CREATININE-BSD FMLA CKD-EPI: >90 ML/MIN/{1.73_M2}
GLUCOSE SERPL-MCNC: 88 MG/DL (ref 70–99)
HCT VFR BLD AUTO: 40.1 % (ref 36–48)
HDLC SERPL-MCNC: 40 MG/DL (ref 40–60)
HGB BLD-MCNC: 13.1 G/DL (ref 12–16)
LDLC SERPL CALC-MCNC: 104 MG/DL
LYMPHOCYTES # BLD: 1.5 K/UL (ref 1–5.1)
LYMPHOCYTES NFR BLD: 31.5 %
MCH RBC QN AUTO: 27.5 PG (ref 26–34)
MCHC RBC AUTO-ENTMCNC: 32.7 G/DL (ref 31–36)
MCV RBC AUTO: 83.9 FL (ref 80–100)
MONOCYTES # BLD: 0.3 K/UL (ref 0–1.3)
MONOCYTES NFR BLD: 6.7 %
NEUTROPHILS # BLD: 2.8 K/UL (ref 1.7–7.7)
NEUTROPHILS NFR BLD: 61.3 %
PLATELET # BLD AUTO: 148 K/UL (ref 135–450)
PMV BLD AUTO: 9.6 FL (ref 5–10.5)
POTASSIUM SERPL-SCNC: 4.3 MMOL/L (ref 3.5–5.1)
PROT SERPL-MCNC: 6.9 G/DL (ref 6.4–8.2)
RBC # BLD AUTO: 4.78 M/UL (ref 4–5.2)
RHEUMATOID FACT SER IA-ACNC: <10 IU/ML
SODIUM SERPL-SCNC: 144 MMOL/L (ref 136–145)
TRIGL SERPL-MCNC: 120 MG/DL (ref 0–150)
TSH SERPL DL<=0.005 MIU/L-ACNC: 3.06 UIU/ML (ref 0.27–4.2)
VIT B12 SERPL-MCNC: 558 PG/ML (ref 211–911)
VLDLC SERPL CALC-MCNC: 24 MG/DL
WBC # BLD AUTO: 4.6 K/UL (ref 4–11)

## 2025-04-12 LAB
ANA SER QL IA: NEGATIVE
CCP IGG SERPL-ACNC: <0.5 U/ML (ref 0–2.9)

## 2025-04-14 ENCOUNTER — RESULTS FOLLOW-UP (OUTPATIENT)
Dept: INTERNAL MEDICINE CLINIC | Age: 70
End: 2025-04-14

## 2025-04-14 ENCOUNTER — OFFICE VISIT (OUTPATIENT)
Dept: ORTHOPEDIC SURGERY | Age: 70
End: 2025-04-14
Payer: MEDICARE

## 2025-04-14 VITALS — HEIGHT: 63 IN | WEIGHT: 192 LBS | BODY MASS INDEX: 34.02 KG/M2

## 2025-04-14 DIAGNOSIS — M25.561 PAIN IN BOTH KNEES, UNSPECIFIED CHRONICITY: Primary | ICD-10-CM

## 2025-04-14 DIAGNOSIS — M25.562 PAIN IN BOTH KNEES, UNSPECIFIED CHRONICITY: Primary | ICD-10-CM

## 2025-04-14 PROCEDURE — 20610 DRAIN/INJ JOINT/BURSA W/O US: CPT | Performed by: PHYSICIAN ASSISTANT

## 2025-04-14 RX ORDER — LIDOCAINE HYDROCHLORIDE 10 MG/ML
4 INJECTION, SOLUTION INFILTRATION; PERINEURAL ONCE
Status: COMPLETED | OUTPATIENT
Start: 2025-04-14 | End: 2025-04-14

## 2025-04-14 RX ORDER — TRIAMCINOLONE ACETONIDE 40 MG/ML
40 INJECTION, SUSPENSION INTRA-ARTICULAR; INTRAMUSCULAR ONCE
Status: COMPLETED | OUTPATIENT
Start: 2025-04-14 | End: 2025-04-14

## 2025-04-14 RX ADMIN — TRIAMCINOLONE ACETONIDE 40 MG: 40 INJECTION, SUSPENSION INTRA-ARTICULAR; INTRAMUSCULAR at 10:48

## 2025-04-14 RX ADMIN — LIDOCAINE HYDROCHLORIDE 4 ML: 10 INJECTION, SOLUTION INFILTRATION; PERINEURAL at 10:47

## 2025-04-14 RX ADMIN — LIDOCAINE HYDROCHLORIDE 4 ML: 10 INJECTION, SOLUTION INFILTRATION; PERINEURAL at 10:48

## 2025-04-14 ASSESSMENT — ENCOUNTER SYMPTOMS
RESPIRATORY NEGATIVE: 1
GASTROINTESTINAL NEGATIVE: 1

## 2025-04-14 NOTE — PROGRESS NOTES
Pt notes 4-5 months of good relief with prior knee injections. We will proceed with repeat injections today.    Joint Injection: risks and benefits were discussed with the patient, after preparation of the injection site with alcohol, a combination of 1cc kenelog and 4cc lidocaine totaling 5 cc was injected into the BILATERAL KNEE joint for arthritis. The procedure was tolerated well without adverse reaction. The patient was counseled on potential reactions to the injection and given information on follow up and when to seek medical attention. The patient will monitor how long relief persists.    Luis Rhodes PA-C

## 2025-04-14 NOTE — PROGRESS NOTES
SUBJECTIVE:    Patient ID: Chantell Velasquez is a 69 y.o. female.    CC: hypertension, thrombocytopenia, Crohn's, depression, knee pain, hand pain    HPI: The patient presents to the office for follow-up of chronic medical conditions and new health concerns.    Patient reports she has been having bilateral knee pain and finger swelling.  Knee pain has been going on for about a month and hand swelling for the past few weeks.  She denies any known injury or trauma.  No fever or chills.    Patient has a history of depression.  She has been seen by psychology and found this to be helpful.  She has also been taking Zoloft 25 mg daily.    Patient has a history of hypertension.  She denies any chest pain or shortness of breath.  She is compliant with her medication regimen.    She has a history of Crohn's.  No recent exacerbation.      Current Outpatient Medications   Medication Sig Dispense Refill    vitamin D (D3-1000) 25 MCG (1000 UT) TABS tablet Take 1 tablet by mouth daily 90 tablet 3    predniSONE (DELTASONE) 20 MG tablet Take 1 tablet by mouth daily for 10 days 10 tablet 0    sertraline (ZOLOFT) 25 MG tablet TAKE 1 TABLET BY MOUTH DAILY 90 tablet 1    atorvastatin (LIPITOR) 20 MG tablet TAKE 1 TABLET BY MOUTH AT BEDTIME 90 tablet 1    folic acid (FOLVITE) 400 MCG tablet TAKE 1 TABLET BY MOUTH DAILY 90 tablet 1    lisinopril (PRINIVIL;ZESTRIL) 20 MG tablet TAKE 1 TABLET BY MOUTH DAILY 90 tablet 3    cyanocobalamin 1000 MCG/ML injection Inject 1 mL into the muscle once       No current facility-administered medications for this visit.          Review of Systems   Constitutional: Negative.    Respiratory: Negative.     Cardiovascular: Negative.    Gastrointestinal: Negative.    Genitourinary: Negative.    Musculoskeletal:  Positive for arthralgias and joint swelling.   Skin: Negative.    Psychiatric/Behavioral: Negative.           OBJECTIVE:  Physical Exam  Constitutional:       Appearance: Normal appearance.   HENT:

## 2025-06-05 ENCOUNTER — OFFICE VISIT (OUTPATIENT)
Dept: INTERNAL MEDICINE CLINIC | Age: 70
End: 2025-06-05
Payer: MEDICARE

## 2025-06-05 VITALS
DIASTOLIC BLOOD PRESSURE: 76 MMHG | HEIGHT: 63 IN | HEART RATE: 78 BPM | TEMPERATURE: 98.9 F | BODY MASS INDEX: 33.06 KG/M2 | WEIGHT: 186.6 LBS | SYSTOLIC BLOOD PRESSURE: 120 MMHG | OXYGEN SATURATION: 96 %

## 2025-06-05 DIAGNOSIS — J01.10 ACUTE NON-RECURRENT FRONTAL SINUSITIS: Primary | ICD-10-CM

## 2025-06-05 DIAGNOSIS — J40 BRONCHITIS: ICD-10-CM

## 2025-06-05 PROCEDURE — 3078F DIAST BP <80 MM HG: CPT | Performed by: NURSE PRACTITIONER

## 2025-06-05 PROCEDURE — 1123F ACP DISCUSS/DSCN MKR DOCD: CPT | Performed by: NURSE PRACTITIONER

## 2025-06-05 PROCEDURE — 3074F SYST BP LT 130 MM HG: CPT | Performed by: NURSE PRACTITIONER

## 2025-06-05 PROCEDURE — 1159F MED LIST DOCD IN RCRD: CPT | Performed by: NURSE PRACTITIONER

## 2025-06-05 PROCEDURE — 99213 OFFICE O/P EST LOW 20 MIN: CPT | Performed by: NURSE PRACTITIONER

## 2025-06-05 PROCEDURE — 1160F RVW MEDS BY RX/DR IN RCRD: CPT | Performed by: NURSE PRACTITIONER

## 2025-06-05 RX ORDER — PREDNISONE 20 MG/1
20 TABLET ORAL 2 TIMES DAILY
Qty: 10 TABLET | Refills: 0 | Status: SHIPPED | OUTPATIENT
Start: 2025-06-05 | End: 2025-06-10

## 2025-06-05 RX ORDER — GUAIFENESIN 600 MG/1
600 TABLET, EXTENDED RELEASE ORAL 2 TIMES DAILY
Qty: 30 TABLET | Refills: 0 | Status: SHIPPED | OUTPATIENT
Start: 2025-06-05 | End: 2025-06-20

## 2025-06-05 RX ORDER — FLUTICASONE PROPIONATE 50 MCG
2 SPRAY, SUSPENSION (ML) NASAL DAILY
Qty: 16 G | Refills: 1 | Status: SHIPPED | OUTPATIENT
Start: 2025-06-05

## 2025-06-05 RX ORDER — BENZONATATE 200 MG/1
200 CAPSULE ORAL 3 TIMES DAILY PRN
Qty: 30 CAPSULE | Refills: 0 | Status: SHIPPED | OUTPATIENT
Start: 2025-06-05 | End: 2025-06-15

## 2025-06-05 ASSESSMENT — ENCOUNTER SYMPTOMS
SHORTNESS OF BREATH: 1
CONSTIPATION: 0
WHEEZING: 1
SINUS PAIN: 1
DIARRHEA: 0
SINUS PRESSURE: 1
VOMITING: 0
CHEST TIGHTNESS: 0
RHINORRHEA: 1
ABDOMINAL PAIN: 0
NAUSEA: 0
COUGH: 1

## 2025-06-05 NOTE — PROGRESS NOTES
Office Visit   6/5/2025    Assessment and Plan:  1. Acute non-recurrent frontal sinusitis  -     amoxicillin-clavulanate (AUGMENTIN) 875-125 MG per tablet; Take 1 tablet by mouth 2 times daily for 10 days, Disp-20 tablet, R-0Normal  -     fluticasone (FLONASE) 50 MCG/ACT nasal spray; 2 sprays by Each Nostril route daily, Disp-16 g, R-1Normal  2. Bronchitis  -     amoxicillin-clavulanate (AUGMENTIN) 875-125 MG per tablet; Take 1 tablet by mouth 2 times daily for 10 days, Disp-20 tablet, R-0Normal  -     predniSONE (DELTASONE) 20 MG tablet; Take 1 tablet by mouth 2 times daily for 5 days, Disp-10 tablet, R-0Normal  -     guaiFENesin (MUCINEX) 600 MG extended release tablet; Take 1 tablet by mouth 2 times daily for 15 days, Disp-30 tablet, R-0Normal  -     benzonatate (TESSALON) 200 MG capsule; Take 1 capsule by mouth 3 times daily as needed for Cough, Disp-30 capsule, R-0Normal     Assessment & Plan  1. Sinus infection/bronchitis  - Reports productive cough with greenish-yellowish mucus containing red streaks, nasal congestion, sore eyes, chills, wheezing, shortness of breath, and postnasal drainage for the past 2 weeks. Initial vomiting has resolved.  - Wheezing noted upon examination.  - Augmentin and prednisone prescribed to be taken twice daily with food. Prednisone should  not be taken close to bedtime due to potential energy-boosting effects. Discontinue Augmentin if it causes stomach upset or diarrhea.  - Nasal spray and Mucinex provided to help thin out secretions and facilitate coughing up mucus. Benzonatate prescribed to be taken three times daily as needed for the cough. If condition does not improve after completing the prescribed treatment, a follow-up visit will be necessary.        Return if symptoms worsen or fail to improve.     Subjective:  Chief Complaint   Patient presents with    Cough     Productive cough with bloody mucus and congestion x 2 weeks        HPI:   Chantell Velasquez is a 69 y.o. female

## 2025-07-04 DIAGNOSIS — E78.5 DYSLIPIDEMIA: ICD-10-CM

## 2025-07-04 DIAGNOSIS — E53.8 FOLATE DEFICIENCY: ICD-10-CM

## 2025-07-07 RX ORDER — ATORVASTATIN CALCIUM 20 MG/1
20 TABLET, FILM COATED ORAL NIGHTLY
Qty: 90 TABLET | Refills: 1 | Status: SHIPPED | OUTPATIENT
Start: 2025-07-07

## 2025-07-07 RX ORDER — FOLIC ACID 0.4 MG
400 TABLET ORAL DAILY
Qty: 90 TABLET | Refills: 1 | Status: SHIPPED | OUTPATIENT
Start: 2025-07-07

## 2025-07-17 ENCOUNTER — OFFICE VISIT (OUTPATIENT)
Dept: ORTHOPEDIC SURGERY | Age: 70
End: 2025-07-17

## 2025-07-17 VITALS — HEIGHT: 63 IN | BODY MASS INDEX: 32.96 KG/M2 | WEIGHT: 186 LBS

## 2025-07-17 DIAGNOSIS — M17.12 PRIMARY OSTEOARTHRITIS OF LEFT KNEE: ICD-10-CM

## 2025-07-17 DIAGNOSIS — M25.561 PAIN IN BOTH KNEES, UNSPECIFIED CHRONICITY: Primary | ICD-10-CM

## 2025-07-17 DIAGNOSIS — M25.562 PAIN IN BOTH KNEES, UNSPECIFIED CHRONICITY: Primary | ICD-10-CM

## 2025-07-17 DIAGNOSIS — M17.11 PRIMARY OSTEOARTHRITIS OF RIGHT KNEE: ICD-10-CM

## 2025-07-17 RX ORDER — TRIAMCINOLONE ACETONIDE 40 MG/ML
40 INJECTION, SUSPENSION INTRA-ARTICULAR; INTRAMUSCULAR ONCE
Status: COMPLETED | OUTPATIENT
Start: 2025-07-17 | End: 2025-07-17

## 2025-07-17 RX ORDER — LIDOCAINE HYDROCHLORIDE 10 MG/ML
4 INJECTION, SOLUTION INFILTRATION; PERINEURAL ONCE
Status: COMPLETED | OUTPATIENT
Start: 2025-07-17 | End: 2025-07-17

## 2025-07-17 RX ADMIN — LIDOCAINE HYDROCHLORIDE 4 ML: 10 INJECTION, SOLUTION INFILTRATION; PERINEURAL at 11:54

## 2025-07-17 RX ADMIN — TRIAMCINOLONE ACETONIDE 40 MG: 40 INJECTION, SUSPENSION INTRA-ARTICULAR; INTRAMUSCULAR at 11:54

## 2025-07-17 RX ADMIN — LIDOCAINE HYDROCHLORIDE 4 ML: 10 INJECTION, SOLUTION INFILTRATION; PERINEURAL at 11:53

## 2025-07-17 RX ADMIN — TRIAMCINOLONE ACETONIDE 40 MG: 40 INJECTION, SUSPENSION INTRA-ARTICULAR; INTRAMUSCULAR at 11:55

## 2025-07-17 NOTE — PROGRESS NOTES
Joint Injection: risks and benefits were discussed with the patient, after preparation of the injection site with alcohol, a combination of 1cc kenalog and 4cc lidocaine totaling 5 cc was injected into the BILATERAL KNEE joint for arthritis. The procedure was tolerated well without adverse reaction. The patient was counseled on potential reactions to the injection and given information on follow up and when to seek medical attention. The patient will monitor how long relief persists. The patient was advised of the possibility of injection site reaction and instructed to apply ice to the area and take NSAIDs/tylenol if able.      We will get her approved for HA injections    Will LEVIRA Rhodes

## 2025-07-22 ENCOUNTER — TELEPHONE (OUTPATIENT)
Dept: ORTHOPEDIC SURGERY | Age: 70
End: 2025-07-22

## 2025-07-22 NOTE — TELEPHONE ENCOUNTER
Lvm informing patient that her gel injections (Euflexxa) were approved and she can call and schedule to come in for them.

## 2025-07-28 ENCOUNTER — OFFICE VISIT (OUTPATIENT)
Dept: ORTHOPEDIC SURGERY | Age: 70
End: 2025-07-28
Payer: MEDICARE

## 2025-07-28 DIAGNOSIS — M17.11 PRIMARY OSTEOARTHRITIS OF RIGHT KNEE: ICD-10-CM

## 2025-07-28 DIAGNOSIS — M17.12 PRIMARY OSTEOARTHRITIS OF LEFT KNEE: Primary | ICD-10-CM

## 2025-07-28 PROCEDURE — 20610 DRAIN/INJ JOINT/BURSA W/O US: CPT | Performed by: ORTHOPAEDIC SURGERY

## 2025-07-28 NOTE — PROGRESS NOTES
Joint Injection: risks and benefits were discussed with the patient, after preparation of the injection site with alcohol, HYALURONIC ACID (euflexxa 20mg) was injected into the bilateral KNEE joint for arthritis. The procedure was tolerated well without adverse reaction. The patient was counseled on potential reactions to the injection and given information on follow up and when to seek medical attention. The patient will monitor how long relief persists.    1/3

## 2025-08-04 ENCOUNTER — OFFICE VISIT (OUTPATIENT)
Dept: ORTHOPEDIC SURGERY | Age: 70
End: 2025-08-04
Payer: MEDICARE

## 2025-08-04 VITALS — WEIGHT: 186 LBS | HEIGHT: 63 IN | BODY MASS INDEX: 32.96 KG/M2

## 2025-08-04 DIAGNOSIS — M17.11 PRIMARY OSTEOARTHRITIS OF RIGHT KNEE: ICD-10-CM

## 2025-08-04 DIAGNOSIS — M17.12 PRIMARY OSTEOARTHRITIS OF LEFT KNEE: Primary | ICD-10-CM

## 2025-08-04 PROCEDURE — 20610 DRAIN/INJ JOINT/BURSA W/O US: CPT | Performed by: ORTHOPAEDIC SURGERY

## 2025-08-05 DIAGNOSIS — F32.1 CURRENT MODERATE EPISODE OF MAJOR DEPRESSIVE DISORDER WITHOUT PRIOR EPISODE (HCC): ICD-10-CM

## 2025-08-05 DIAGNOSIS — F43.21 GRIEF: ICD-10-CM

## 2025-08-05 RX ORDER — SERTRALINE HYDROCHLORIDE 25 MG/1
25 TABLET, FILM COATED ORAL DAILY
Qty: 90 TABLET | Refills: 1 | Status: SHIPPED | OUTPATIENT
Start: 2025-08-05

## 2025-08-11 ENCOUNTER — OFFICE VISIT (OUTPATIENT)
Dept: ORTHOPEDIC SURGERY | Age: 70
End: 2025-08-11

## 2025-08-11 DIAGNOSIS — M17.12 PRIMARY OSTEOARTHRITIS OF LEFT KNEE: ICD-10-CM

## 2025-08-11 DIAGNOSIS — M17.11 PRIMARY OSTEOARTHRITIS OF RIGHT KNEE: Primary | ICD-10-CM

## 2025-09-04 ENCOUNTER — OFFICE VISIT (OUTPATIENT)
Dept: INTERNAL MEDICINE CLINIC | Age: 70
End: 2025-09-04

## 2025-09-04 VITALS
DIASTOLIC BLOOD PRESSURE: 82 MMHG | OXYGEN SATURATION: 97 % | BODY MASS INDEX: 32.11 KG/M2 | HEART RATE: 61 BPM | SYSTOLIC BLOOD PRESSURE: 120 MMHG | HEIGHT: 63 IN | WEIGHT: 181.2 LBS

## 2025-09-04 DIAGNOSIS — M54.42 ACUTE LEFT-SIDED LOW BACK PAIN WITH BILATERAL SCIATICA: Primary | ICD-10-CM

## 2025-09-04 DIAGNOSIS — M54.41 ACUTE LEFT-SIDED LOW BACK PAIN WITH BILATERAL SCIATICA: Primary | ICD-10-CM

## 2025-09-04 DIAGNOSIS — I10 ESSENTIAL HYPERTENSION: ICD-10-CM

## 2025-09-04 DIAGNOSIS — H93.8X2 SENSATION OF FULLNESS IN LEFT EAR: ICD-10-CM

## 2025-09-04 RX ORDER — TIZANIDINE 2 MG/1
2 TABLET ORAL 3 TIMES DAILY PRN
Qty: 15 TABLET | Refills: 0 | Status: SHIPPED | OUTPATIENT
Start: 2025-09-04

## 2025-09-04 RX ORDER — PREDNISONE 20 MG/1
20 TABLET ORAL 2 TIMES DAILY
Qty: 10 TABLET | Refills: 0 | Status: SHIPPED | OUTPATIENT
Start: 2025-09-04 | End: 2025-09-09

## 2025-09-04 ASSESSMENT — ENCOUNTER SYMPTOMS
BACK PAIN: 1
GASTROINTESTINAL NEGATIVE: 1
RESPIRATORY NEGATIVE: 1